# Patient Record
Sex: MALE | Race: WHITE | NOT HISPANIC OR LATINO | URBAN - METROPOLITAN AREA
[De-identification: names, ages, dates, MRNs, and addresses within clinical notes are randomized per-mention and may not be internally consistent; named-entity substitution may affect disease eponyms.]

---

## 2017-01-03 VITALS
TEMPERATURE: 99 F | SYSTOLIC BLOOD PRESSURE: 134 MMHG | RESPIRATION RATE: 18 BRPM | WEIGHT: 159.61 LBS | HEART RATE: 78 BPM | HEIGHT: 66 IN | OXYGEN SATURATION: 96 % | DIASTOLIC BLOOD PRESSURE: 69 MMHG

## 2017-01-03 NOTE — ASU PATIENT PROFILE, ADULT - VISION (WITH CORRECTIVE LENSES IF THE PATIENT USUALLY WEARS THEM):
Normal vision: sees adequately in most situations; can see medication labels, newsprint glasses/Partially impaired: cannot see medication labels or newsprint, but can see obstacles in path, and the surrounding layout; can count fingers at arm's length

## 2017-01-04 ENCOUNTER — OUTPATIENT (OUTPATIENT)
Dept: OUTPATIENT SERVICES | Facility: HOSPITAL | Age: 72
LOS: 1 days | Discharge: HOME | End: 2017-01-04
Payer: MEDICARE

## 2017-01-04 VITALS
DIASTOLIC BLOOD PRESSURE: 62 MMHG | RESPIRATION RATE: 18 BRPM | SYSTOLIC BLOOD PRESSURE: 115 MMHG | HEART RATE: 77 BPM | OXYGEN SATURATION: 97 % | TEMPERATURE: 98 F

## 2017-01-04 DIAGNOSIS — Z87.891 PERSONAL HISTORY OF NICOTINE DEPENDENCE: ICD-10-CM

## 2017-01-04 DIAGNOSIS — M75.42 IMPINGEMENT SYNDROME OF LEFT SHOULDER: ICD-10-CM

## 2017-01-04 DIAGNOSIS — M75.122 COMPLETE ROTATOR CUFF TEAR OR RUPTURE OF LEFT SHOULDER, NOT SPECIFIED AS TRAUMATIC: ICD-10-CM

## 2017-01-04 DIAGNOSIS — Z98.890 OTHER SPECIFIED POSTPROCEDURAL STATES: Chronic | ICD-10-CM

## 2017-01-04 PROCEDURE — 29826 SHO ARTHRS SRG DECOMPRESSION: CPT | Mod: LT

## 2017-01-04 PROCEDURE — 29827 SHO ARTHRS SRG RT8TR CUF RPR: CPT | Mod: LT

## 2017-01-04 PROCEDURE — C1713: CPT

## 2017-01-04 RX ORDER — SODIUM CHLORIDE 9 MG/ML
1000 INJECTION, SOLUTION INTRAVENOUS
Qty: 0 | Refills: 0 | Status: DISCONTINUED | OUTPATIENT
Start: 2017-01-04 | End: 2017-01-04

## 2017-01-04 RX ORDER — MORPHINE SULFATE 50 MG/1
2 CAPSULE, EXTENDED RELEASE ORAL
Qty: 0 | Refills: 0 | Status: DISCONTINUED | OUTPATIENT
Start: 2017-01-04 | End: 2017-01-04

## 2018-11-05 ENCOUNTER — APPOINTMENT (OUTPATIENT)
Dept: PULMONOLOGY | Facility: CLINIC | Age: 73
End: 2018-11-05
Payer: MEDICARE

## 2018-11-05 VITALS
DIASTOLIC BLOOD PRESSURE: 80 MMHG | BODY MASS INDEX: 26.36 KG/M2 | OXYGEN SATURATION: 5 % | HEART RATE: 80 BPM | HEIGHT: 66 IN | WEIGHT: 164 LBS | SYSTOLIC BLOOD PRESSURE: 138 MMHG | RESPIRATION RATE: 16 BRPM

## 2018-11-05 DIAGNOSIS — N40.0 BENIGN PROSTATIC HYPERPLASIA WITHOUT LOWER URINARY TRACT SYMPMS: ICD-10-CM

## 2018-11-05 DIAGNOSIS — Z78.9 OTHER SPECIFIED HEALTH STATUS: ICD-10-CM

## 2018-11-05 PROCEDURE — 99203 OFFICE O/P NEW LOW 30 MIN: CPT | Mod: 25

## 2018-11-05 PROCEDURE — 94729 DIFFUSING CAPACITY: CPT

## 2018-11-05 PROCEDURE — 94727 GAS DIL/WSHOT DETER LNG VOL: CPT

## 2018-11-05 PROCEDURE — 94060 EVALUATION OF WHEEZING: CPT

## 2018-11-06 PROBLEM — N40.0 ENLARGED PROSTATE: Status: RESOLVED | Noted: 2018-11-06 | Resolved: 2018-11-06

## 2018-11-06 PROBLEM — Z78.9 NON-SMOKER: Status: ACTIVE | Noted: 2018-11-06

## 2019-02-27 ENCOUNTER — APPOINTMENT (OUTPATIENT)
Dept: PULMONOLOGY | Facility: CLINIC | Age: 74
End: 2019-02-27
Payer: MEDICARE

## 2019-02-27 VITALS
SYSTOLIC BLOOD PRESSURE: 138 MMHG | HEART RATE: 116 BPM | BODY MASS INDEX: 25.39 KG/M2 | WEIGHT: 158 LBS | DIASTOLIC BLOOD PRESSURE: 74 MMHG | HEIGHT: 66 IN | OXYGEN SATURATION: 94 %

## 2019-02-27 PROCEDURE — 99214 OFFICE O/P EST MOD 30 MIN: CPT

## 2019-02-27 NOTE — DISCUSSION/SUMMARY
[FreeTextEntry1] : 72 yo male with airway hyperreactivity. PRN albuterol MDI use recommended. If use increases, ICS alone will be prescribed. He is to follow up with his PMD as before.

## 2019-02-27 NOTE — REVIEW OF SYSTEMS
[As Noted in HPI] : as noted in HPI [Cough] : no cough [Sputum] : not coughing up ~M sputum [Dyspnea] : dyspnea [Wheezing] : wheezing [Negative] : Sleep Disorder

## 2019-02-27 NOTE — HISTORY OF PRESENT ILLNESS
[FreeTextEntry1] : 74 yo male with hx of OAD presents for follow up. The patient was treated initially with breo 200 and albuterol , with resolution of symptoms. Last week, he again started wheezing with SOB, for which he took breo for two days with improvement. He denies cough, chest pain or hemoptysis.

## 2020-01-29 ENCOUNTER — APPOINTMENT (OUTPATIENT)
Dept: PULMONOLOGY | Facility: CLINIC | Age: 75
End: 2020-01-29
Payer: MEDICARE

## 2020-01-29 VITALS
OXYGEN SATURATION: 95 % | HEIGHT: 67 IN | BODY MASS INDEX: 25.43 KG/M2 | HEART RATE: 72 BPM | WEIGHT: 162 LBS | DIASTOLIC BLOOD PRESSURE: 76 MMHG | SYSTOLIC BLOOD PRESSURE: 139 MMHG

## 2020-01-29 PROCEDURE — 94729 DIFFUSING CAPACITY: CPT

## 2020-01-29 PROCEDURE — 94060 EVALUATION OF WHEEZING: CPT

## 2020-01-29 PROCEDURE — 99214 OFFICE O/P EST MOD 30 MIN: CPT | Mod: 25

## 2020-01-29 PROCEDURE — 94727 GAS DIL/WSHOT DETER LNG VOL: CPT

## 2020-02-07 NOTE — PHYSICAL EXAM
[No Acute Distress] : no acute distress [Normal Oropharynx] : normal oropharynx [Normal Appearance] : normal appearance [No Neck Mass] : no neck mass [Normal Rate/Rhythm] : normal rate/rhythm [Normal S1, S2] : normal s1, s2 [No Murmurs] : no murmurs [No Resp Distress] : no resp distress [Wheeze] : wheeze [No Abnormalities] : no abnormalities [Benign] : benign [Normal Gait] : normal gait [No Clubbing] : no clubbing [No Cyanosis] : no cyanosis [No Edema] : no edema [FROM] : FROM [Normal Color/ Pigmentation] : normal color/ pigmentation [No Focal Deficits] : no focal deficits [Oriented x3] : oriented x3 [Normal Affect] : normal affect [TextBox_105] : Right hand abscess

## 2020-02-07 NOTE — HISTORY OF PRESENT ILLNESS
[TextBox_4] : 73 yo male with hx of hyperreactive airways disease presents complaining of wheezing with GRANDA for six weeks. He denies cough, chest pain or hemoptysis. The patient has used breo 100 PRN in the past with improvement.

## 2020-02-07 NOTE — DISCUSSION/SUMMARY
[FreeTextEntry1] : 75 yo male with OAD related complaints. Medrol pack prescribed. He was given a sample of Breo 100 with PRN albuterol MDI. Treatment adjustment will depend on symptomatic needs. He is to follow up with his PMD as before.

## 2020-06-03 NOTE — ASU PATIENT PROFILE, ADULT - PRO ARRIVE FROM
Dear Team Member,                                Continue to self-monitor for symptoms. If you should develop symptoms, do not report to work, notify your leader, and contact your local Employee Health department for next steps.    Thank you,    Employee Health      Cc: Manager   home

## 2020-08-10 ENCOUNTER — APPOINTMENT (OUTPATIENT)
Dept: PULMONOLOGY | Facility: CLINIC | Age: 75
End: 2020-08-10
Payer: MEDICARE

## 2020-08-10 VITALS
HEIGHT: 67 IN | RESPIRATION RATE: 16 BRPM | SYSTOLIC BLOOD PRESSURE: 120 MMHG | DIASTOLIC BLOOD PRESSURE: 70 MMHG | HEART RATE: 100 BPM | OXYGEN SATURATION: 92 %

## 2020-08-10 PROCEDURE — 99214 OFFICE O/P EST MOD 30 MIN: CPT

## 2020-08-10 NOTE — HISTORY OF PRESENT ILLNESS
[TextBox_4] : 75 yo male with hx of OAD presents for follow up. The patient complains of a two week hx of GRANDA with productive cough and wheezing.He denies fever, chest pain or hemoptysis. He uses albuterol MDI with increased frequency during this time. He last used breo a few weeks ago. The patient also complains of weight loss over the last few months.The patient is a retired , exposed to the [facility] disaster. [TextBox_29] : former pipe smoker

## 2020-08-10 NOTE — DISCUSSION/SUMMARY
[FreeTextEntry1] : 75 yo male with OAD exacerbation. Medrol and zpack prescribed. He is to continue breo 100 daily after with PRN albuterol MDI. Treatment adjustment will depend on symptomatic needs. PFT will be repeated in the future. Further evaluation for weight loss as per his PMD with continued follow up.

## 2020-08-10 NOTE — REVIEW OF SYSTEMS
[Wheezing] : wheezing [Dyspnea] : dyspnea [Negative] : Musculoskeletal [Cough] : no cough [Sputum] : no sputum

## 2020-08-10 NOTE — CONSULT LETTER
[Dear  ___] : Dear  [unfilled], [Courtesy Letter:] : I had the pleasure of seeing your patient, [unfilled], in my office today. [Consult Closing:] : Thank you very much for allowing me to participate in the care of this patient.  If you have any questions, please do not hesitate to contact me. [Please see my note below.] : Please see my note below. [Sincerely,] : Sincerely, [___] : [unfilled]

## 2020-08-10 NOTE — PHYSICAL EXAM
[Normal Oropharynx] : normal oropharynx [No Acute Distress] : no acute distress [No Neck Mass] : no neck mass [Normal Rate/Rhythm] : normal rate/rhythm [Normal Appearance] : normal appearance [No Murmurs] : no murmurs [Normal S1, S2] : normal s1, s2 [No Resp Distress] : no resp distress [Normal Gait] : normal gait [No Abnormalities] : no abnormalities [Benign] : benign [No Clubbing] : no clubbing [No Cyanosis] : no cyanosis [FROM] : FROM [No Edema] : no edema [Normal Color/ Pigmentation] : normal color/ pigmentation [Oriented x3] : oriented x3 [No Focal Deficits] : no focal deficits [Normal Affect] : normal affect [TextBox_68] : crackles both bases with scattered rhonchi

## 2020-09-21 ENCOUNTER — APPOINTMENT (OUTPATIENT)
Dept: PULMONOLOGY | Facility: CLINIC | Age: 75
End: 2020-09-21
Payer: MEDICARE

## 2020-09-21 VITALS
TEMPERATURE: 97.8 F | BODY MASS INDEX: 23.39 KG/M2 | OXYGEN SATURATION: 97 % | HEIGHT: 67 IN | WEIGHT: 149 LBS | HEART RATE: 70 BPM | SYSTOLIC BLOOD PRESSURE: 127 MMHG | DIASTOLIC BLOOD PRESSURE: 66 MMHG | RESPIRATION RATE: 16 BRPM

## 2020-09-21 PROCEDURE — 99214 OFFICE O/P EST MOD 30 MIN: CPT

## 2020-09-21 RX ORDER — AZITHROMYCIN 250 MG/1
250 TABLET, FILM COATED ORAL
Qty: 1 | Refills: 0 | Status: COMPLETED | COMMUNITY
Start: 2020-08-10 | End: 2020-09-21

## 2020-09-21 RX ORDER — METHYLPREDNISOLONE 4 MG/1
4 TABLET ORAL
Qty: 1 | Refills: 0 | Status: COMPLETED | COMMUNITY
Start: 2020-01-29 | End: 2020-09-21

## 2020-09-21 RX ORDER — DOXYCYCLINE HYCLATE 100 MG/1
100 TABLET ORAL
Qty: 20 | Refills: 0 | Status: COMPLETED | COMMUNITY
Start: 2020-06-18

## 2020-09-21 RX ORDER — ETHAMBUTOL HYDROCHLORIDE 400 MG/1
400 TABLET ORAL
Qty: 28 | Refills: 0 | Status: COMPLETED | COMMUNITY
Start: 2020-04-24

## 2020-09-21 RX ORDER — CLARITHROMYCIN 500 MG/1
500 TABLET, FILM COATED ORAL
Qty: 28 | Refills: 0 | Status: COMPLETED | COMMUNITY
Start: 2020-04-24

## 2020-09-27 NOTE — DISCUSSION/SUMMARY
[FreeTextEntry1] : 74 yo male with mild OAD, clinically stable. He is to use albuterol MDI PRN alone for now. Treatment adjustment will depend on symptomatic needs.He was to use nasal saline PRN given rhinitis. He is to follow up with [employer] given [facility] exposure and with his PMD as before and for the influenza and pneumococcal vaccines.

## 2020-09-27 NOTE — HISTORY OF PRESENT ILLNESS
[Never] : never [TextBox_4] : 76 yo male with hx of mild OAD presents for follow up. The patient feels "better" since last visit, treated with breo daily and albuterol MDI. He has not used either inhaler for a few weeks. He complains of nasal congestion without fever, cough, chest pain or hemoptysis. [TextBox_19] : Denies snoring, daytime somnolence, apneic episodes, AM headaches

## 2020-09-27 NOTE — PHYSICAL EXAM
[No Acute Distress] : no acute distress [Normal Oropharynx] : normal oropharynx [Normal Appearance] : normal appearance [No Neck Mass] : no neck mass [Normal Rate/Rhythm] : normal rate/rhythm [Normal S1, S2] : normal s1, s2 [No Murmurs] : no murmurs [No Resp Distress] : no resp distress [No Abnormalities] : no abnormalities [Benign] : benign [Normal Gait] : normal gait [No Clubbing] : no clubbing [No Cyanosis] : no cyanosis [No Edema] : no edema [FROM] : FROM [Normal Color/ Pigmentation] : normal color/ pigmentation [No Focal Deficits] : no focal deficits [Oriented x3] : oriented x3 [Normal Affect] : normal affect [TextBox_68] : crackles both bases

## 2020-09-29 NOTE — ASU PATIENT PROFILE, ADULT - NS SC CAGE ALCOHOL EYE OPENER
Patient here today for cystoscopy secondary to hx High Grade Muscle Invasive bladder cancer. Concerns include frequency, urgency, incontinence, weak stream, nocturia, dysuria and straining. Denies symptoms incomplete emptying and hematuria. Denies known Latex allergy or symptoms of Latex sensitivity. Medications verified, no changes. Patient instructed on procedure, and all questions answered. Patient prepped with 2% Lidocaine jelly in usual fashion, using sterile technique. 16 FR olive tip coude catheter placed into bladder with difficulty, unable to pass into bladder completely. Patient tolerated well. Dr Lakeisha Cleary arrived and began procedure. Patient and family friend instructed by Gallo Castanon that the first couple of urination will burn/sting and small amount of blood in urine may be visible. Patient instructed to increase water intake. If symptoms persist or if temp >101.5 is noted patient to return call for further instruction. Patient verbalized understanding. no

## 2020-10-19 ENCOUNTER — APPOINTMENT (OUTPATIENT)
Dept: PULMONOLOGY | Facility: CLINIC | Age: 75
End: 2020-10-19
Payer: MEDICARE

## 2020-10-19 VITALS
WEIGHT: 148 LBS | TEMPERATURE: 96.8 F | OXYGEN SATURATION: 91 % | HEIGHT: 67 IN | SYSTOLIC BLOOD PRESSURE: 118 MMHG | DIASTOLIC BLOOD PRESSURE: 67 MMHG | BODY MASS INDEX: 23.23 KG/M2 | HEART RATE: 84 BPM

## 2020-10-19 VITALS — RESPIRATION RATE: 18 BRPM | OXYGEN SATURATION: 95 %

## 2020-10-19 PROCEDURE — 99214 OFFICE O/P EST MOD 30 MIN: CPT

## 2020-10-19 RX ORDER — FLUTICASONE FUROATE AND VILANTEROL TRIFENATATE 100; 25 UG/1; UG/1
100-25 POWDER RESPIRATORY (INHALATION)
Qty: 1 | Refills: 5 | Status: DISCONTINUED | OUTPATIENT
Start: 2020-01-29 | End: 2020-10-19

## 2020-10-19 RX ORDER — METHYLPREDNISOLONE 4 MG/1
4 TABLET ORAL
Qty: 1 | Refills: 0 | Status: ACTIVE | COMMUNITY
Start: 2020-10-19 | End: 1900-01-01

## 2020-10-26 NOTE — DISCUSSION/SUMMARY
[FreeTextEntry1] : 74 yo male with OAD related complaints made worse by recent exposure to leaves and seasonal change.Medrol pack prescribed with daily montelukast use and PRN albuterol MDI PRN. Treatment adjustment will depend on symptomatic needs. He states that he has received the influenza vaccine. Follow up with his PMD as before.

## 2020-10-26 NOTE — PHYSICAL EXAM
[No Acute Distress] : no acute distress [Normal Oropharynx] : normal oropharynx [Normal Appearance] : normal appearance [No Neck Mass] : no neck mass [Normal Rate/Rhythm] : normal rate/rhythm [Normal S1, S2] : normal s1, s2 [No Murmurs] : no murmurs [No Resp Distress] : no resp distress [Wheeze] : wheeze [No Abnormalities] : no abnormalities [Benign] : benign [Normal Gait] : normal gait [No Clubbing] : no clubbing [No Cyanosis] : no cyanosis [No Edema] : no edema [FROM] : FROM [Normal Color/ Pigmentation] : normal color/ pigmentation [No Focal Deficits] : no focal deficits [Oriented x3] : oriented x3 [Normal Affect] : normal affect [TextBox_68] : crackles both bases

## 2020-10-26 NOTE — HISTORY OF PRESENT ILLNESS
[Never] : never [TextBox_4] : 74 yo male with hx of mild OAD presents for follow up. The patient complains of increase in dry cough with wheezing and [place].. He denies fever, chest pain or hemoptysis. He has increased use of albuterol MDI during this time. He is on no other respiratory meds.  [TextBox_29] : Denies snoring, daytime somnolence, apneic episodes, AM headaches

## 2020-10-26 NOTE — REVIEW OF SYSTEMS
[Nasal Congestion] : nasal congestion [Postnasal Drip] : postnasal drip [Cough] : cough [Dyspnea] : dyspnea [Wheezing] : wheezing [Negative] : Endocrine [Sputum] : no sputum

## 2020-10-26 NOTE — CONSULT LETTER
[Dear  ___] : Dear  [unfilled], [Courtesy Letter:] : I had the pleasure of seeing your patient, [unfilled], in my office today. [Please see my note below.] : Please see my note below. [Consult Closing:] : Thank you very much for allowing me to participate in the care of this patient.  If you have any questions, please do not hesitate to contact me. [Sincerely,] : Sincerely, [DrSandy  ___] : Dr. ANN [___] : [unfilled]

## 2020-12-21 ENCOUNTER — APPOINTMENT (OUTPATIENT)
Dept: PULMONOLOGY | Facility: CLINIC | Age: 75
End: 2020-12-21
Payer: MEDICARE

## 2020-12-21 VITALS
TEMPERATURE: 98 F | RESPIRATION RATE: 16 BRPM | WEIGHT: 150 LBS | BODY MASS INDEX: 23.54 KG/M2 | HEART RATE: 80 BPM | DIASTOLIC BLOOD PRESSURE: 70 MMHG | HEIGHT: 67 IN | OXYGEN SATURATION: 94 % | SYSTOLIC BLOOD PRESSURE: 120 MMHG

## 2020-12-21 PROCEDURE — 99214 OFFICE O/P EST MOD 30 MIN: CPT

## 2020-12-22 NOTE — REVIEW OF SYSTEMS
[Cough] : cough [Dyspnea] : dyspnea [Wheezing] : wheezing [Negative] : Endocrine [Nasal Congestion] : no nasal congestion [Postnasal Drip] : no postnasal drip [Sputum] : no sputum

## 2020-12-22 NOTE — HISTORY OF PRESENT ILLNESS
[Never] : never [TextBox_4] : 76 yo male with hx of AOD presents for follow up. The patient complains of a 10 day hx of SOB with mild dry cough. He denies fever, chest pain or hemoptysis. He has increased use of albuterol MDI but has stopped montelukast use. [TextBox_29] : Denies snoring, daytime somnolence, apneic episodes, AM headaches

## 2020-12-22 NOTE — DISCUSSION/SUMMARY
[FreeTextEntry1] : 76 yo male with OAD related complaints, in part due to having stopped meds. Prednisone 40 mg daily for five days prescribed. He is to restart use of montelukast with PRN albuterol MDI. He was given a sample of trelegy to use transiently. He is to follow up with his PMD  and [facility] board as before.

## 2021-03-04 NOTE — REVIEW OF SYSTEMS
endoscopy [Nasal Congestion] : nasal congestion [Postnasal Drip] : postnasal drip [Cough] : no cough [Sputum] : no sputum [Dyspnea] : dyspnea [Wheezing] : wheezing [Negative] : Endocrine

## 2021-03-08 ENCOUNTER — APPOINTMENT (OUTPATIENT)
Dept: PULMONOLOGY | Facility: CLINIC | Age: 76
End: 2021-03-08
Payer: MEDICARE

## 2021-03-08 VITALS
SYSTOLIC BLOOD PRESSURE: 120 MMHG | WEIGHT: 150 LBS | HEART RATE: 82 BPM | RESPIRATION RATE: 18 BRPM | DIASTOLIC BLOOD PRESSURE: 70 MMHG | HEIGHT: 67 IN | BODY MASS INDEX: 23.54 KG/M2 | OXYGEN SATURATION: 96 %

## 2021-03-08 PROCEDURE — 99212 OFFICE O/P EST SF 10 MIN: CPT

## 2021-03-08 NOTE — REVIEW OF SYSTEMS
[Nasal Congestion] : no nasal congestion [Postnasal Drip] : no postnasal drip [Cough] : cough [Sputum] : no sputum [Dyspnea] : dyspnea [Wheezing] : wheezing [Negative] : Endocrine

## 2021-03-08 NOTE — DISCUSSION/SUMMARY
[FreeTextEntry1] : 76 yo male with recurrence of OAD related complaints. Prednisone 40 mg daily for five days prescribed. He was given a sample of trelegy 100 to use for two weeks after with continued use of montelukast daily and PRN albuterol MDI as before.Given the frequency of symptoms, labs for asthma phenotyping will be drawn. Further treatment adjustment will depend on symptomatic needs He is to follow up with his PMD as before.

## 2021-03-08 NOTE — HISTORY OF PRESENT ILLNESS
[Never] : never [TextBox_4] : 76 yo male with hx of OAD presents for follow up. The patient complains of few day history of wheezing with SOB requiring increase in albuterol MDI use. He denies fever, chest pain or hemoptysis. He continues to use montelukast daily.  [TextBox_29] : Denies snoring, daytime somnolence, apneic episodes, AM headaches

## 2021-05-26 ENCOUNTER — APPOINTMENT (OUTPATIENT)
Dept: PULMONOLOGY | Facility: CLINIC | Age: 76
End: 2021-05-26
Payer: MEDICARE

## 2021-05-26 VITALS
OXYGEN SATURATION: 93 % | DIASTOLIC BLOOD PRESSURE: 76 MMHG | BODY MASS INDEX: 23.54 KG/M2 | HEART RATE: 100 BPM | SYSTOLIC BLOOD PRESSURE: 135 MMHG | HEIGHT: 67 IN | TEMPERATURE: 97.1 F | WEIGHT: 150 LBS

## 2021-05-26 PROCEDURE — 99213 OFFICE O/P EST LOW 20 MIN: CPT

## 2021-06-12 NOTE — HISTORY OF PRESENT ILLNESS
[TextBox_4] : 76 yo male with hx of OAD presents complaining of two week history of SOB with productive cough and wheezing. He denies fever, chest pain or hemoptysis. He felt better after two week trelegy use two months ago, with continued use of montelukast daily and PRN albuterol MDI.

## 2021-06-12 NOTE — DISCUSSION/SUMMARY
[FreeTextEntry1] : 76 yo male with recurrent OAD related complaints. He was given a two week sample of trelegy after which he is to continue breo 200 with daily montelukast and PRN albuterol MDI. Based on asthma phenotype, the patient is a candidate for use of biologics which was discussed and will be considered in the future if needed. He is to follow up with his PMD as before.

## 2021-06-12 NOTE — REVIEW OF SYSTEMS
[Nasal Congestion] : no nasal congestion [Postnasal Drip] : no postnasal drip [Cough] : cough [Sputum] : sputum [Dyspnea] : dyspnea [Wheezing] : wheezing [Negative] : Endocrine

## 2022-02-21 ENCOUNTER — APPOINTMENT (OUTPATIENT)
Dept: PULMONOLOGY | Facility: CLINIC | Age: 77
End: 2022-02-21
Payer: MEDICARE

## 2022-02-21 VITALS
BODY MASS INDEX: 23.54 KG/M2 | TEMPERATURE: 98 F | WEIGHT: 150 LBS | SYSTOLIC BLOOD PRESSURE: 138 MMHG | HEART RATE: 88 BPM | DIASTOLIC BLOOD PRESSURE: 77 MMHG | RESPIRATION RATE: 16 BRPM | HEIGHT: 67 IN | OXYGEN SATURATION: 95 %

## 2022-02-21 DIAGNOSIS — J45.21 MILD INTERMITTENT ASTHMA WITH (ACUTE) EXACERBATION: ICD-10-CM

## 2022-02-21 PROCEDURE — 99214 OFFICE O/P EST MOD 30 MIN: CPT

## 2022-03-19 PROBLEM — J45.21 INTERMITTENT ASTHMA WITH ACUTE EXACERBATION, UNSPECIFIED ASTHMA SEVERITY: Status: ACTIVE | Noted: 2018-11-06

## 2022-03-19 NOTE — HISTORY OF PRESENT ILLNESS
[Never] : never [TextBox_4] : 75 yo male with hx of OAD presents for follow up. The patient was "OK" until a few weeks ago, complaining of SOB with productive cough and wheezing. He denies fever, chest pain or hemoptysis. He was on montelukast daily with rare albuterol use, having stopped breo five months ago. He restarted breo yesterday with continued montelukast use [TextBox_29] : Denies snoring, daytime somnolence, apneic episodes, AM headaches

## 2022-03-19 NOTE — DISCUSSION/SUMMARY
[FreeTextEntry1] : 75 yo male with hx of OAD with related complaints.He was given a two week sample of trelegy 200 after which he is to continue with breo as before. He is to continue montelukast and albuterol MDi as before. He is to follow up with his PMD as before.

## 2022-06-20 ENCOUNTER — APPOINTMENT (OUTPATIENT)
Dept: PULMONOLOGY | Facility: CLINIC | Age: 77
End: 2022-06-20

## 2022-06-20 VITALS
OXYGEN SATURATION: 93 % | DIASTOLIC BLOOD PRESSURE: 75 MMHG | RESPIRATION RATE: 24 BRPM | SYSTOLIC BLOOD PRESSURE: 135 MMHG | HEART RATE: 92 BPM | BODY MASS INDEX: 23.54 KG/M2 | WEIGHT: 150 LBS | HEIGHT: 67 IN

## 2022-06-20 PROCEDURE — 99214 OFFICE O/P EST MOD 30 MIN: CPT

## 2022-06-20 RX ORDER — DUTASTERIDE AND TAMSULOSIN HYDROCHLORIDE .5; .4 MG/1; MG/1
0.5-0.4 CAPSULE ORAL
Qty: 90 | Refills: 0 | Status: COMPLETED | COMMUNITY
Start: 2020-04-11 | End: 2022-06-20

## 2022-06-20 RX ORDER — PREDNISONE 20 MG/1
20 TABLET ORAL
Qty: 10 | Refills: 0 | Status: COMPLETED | COMMUNITY
Start: 2020-12-21 | End: 2022-06-20

## 2022-06-20 RX ORDER — MONTELUKAST 10 MG/1
10 TABLET, FILM COATED ORAL
Qty: 90 | Refills: 3 | Status: ACTIVE | COMMUNITY
Start: 2022-02-21 | End: 1900-01-01

## 2022-06-20 RX ORDER — PREDNISONE 20 MG/1
20 TABLET ORAL
Qty: 10 | Refills: 0 | Status: COMPLETED | COMMUNITY
Start: 2021-03-08 | End: 2022-06-20

## 2022-06-20 RX ORDER — FLUTICASONE FUROATE AND VILANTEROL TRIFENATATE 200; 25 UG/1; UG/1
200-25 POWDER RESPIRATORY (INHALATION)
Qty: 1 | Refills: 3 | Status: ACTIVE | COMMUNITY
Start: 2021-05-26 | End: 1900-01-01

## 2022-06-20 RX ORDER — ALBUTEROL SULFATE 108 UG/1
108 (90 BASE) AEROSOL, METERED RESPIRATORY (INHALATION)
Refills: 0 | Status: COMPLETED | COMMUNITY
Start: 2018-11-06 | End: 2022-06-20

## 2022-06-20 RX ORDER — MONTELUKAST 10 MG/1
10 TABLET, FILM COATED ORAL
Qty: 90 | Refills: 3 | Status: COMPLETED | COMMUNITY
Start: 2020-10-19 | End: 2022-06-20

## 2022-06-20 RX ORDER — ALBUTEROL SULFATE 90 UG/1
108 (90 BASE) INHALANT RESPIRATORY (INHALATION)
Qty: 1 | Refills: 1 | Status: ACTIVE | COMMUNITY
Start: 2020-01-29 | End: 1900-01-01

## 2022-06-30 NOTE — DISCUSSION/SUMMARY
[FreeTextEntry1] : 75 yo male with OAD related complaints with environmental component. Breo 200 restarted with continued use of montelukast daily and PRN albuterol MDI. Treatment adjustment will depend on symptomatic needs.He is to follow up with his PMD as before.

## 2022-06-30 NOTE — HISTORY OF PRESENT ILLNESS
[Never] : never [TextBox_4] : 75 yo male with hx of OAD presents for follow up. The patient is "better" after last week when he complained of increased cough and SOB, requiring increased albuterol MDI use. [place] He denies fever, chest pain or hemoptysis. He continues to use montelukast daily with PRN albuterol MDI. [TextBox_29] : Denies snoring, daytime somnolence, apneic episodes, AM headaches

## 2022-06-30 NOTE — PHYSICAL EXAM
[No Acute Distress] : no acute distress [Normal Oropharynx] : normal oropharynx [Normal Appearance] : normal appearance [No Neck Mass] : no neck mass [Normal Rate/Rhythm] : normal rate/rhythm [No Murmurs] : no murmurs [Normal S1, S2] : normal s1, s2 [No Resp Distress] : no resp distress [Clear to Auscultation Bilaterally] : clear to auscultation bilaterally [Wheeze] : wheeze [No Abnormalities] : no abnormalities [Benign] : benign [Normal Gait] : normal gait [No Clubbing] : no clubbing [No Cyanosis] : no cyanosis [No Edema] : no edema [FROM] : FROM [Normal Color/ Pigmentation] : normal color/ pigmentation [No Focal Deficits] : no focal deficits [Oriented x3] : oriented x3 [Normal Affect] : normal affect

## 2022-06-30 NOTE — REVIEW OF SYSTEMS
[Nasal Congestion] : no nasal congestion [Postnasal Drip] : no postnasal drip [Cough] : cough [Sputum] : sputum [Dyspnea] : dyspnea [Wheezing] : no wheezing [Negative] : Endocrine

## 2023-01-23 ENCOUNTER — NON-APPOINTMENT (OUTPATIENT)
Age: 78
End: 2023-01-23

## 2023-01-24 ENCOUNTER — OUTPATIENT (OUTPATIENT)
Dept: OUTPATIENT SERVICES | Facility: HOSPITAL | Age: 78
LOS: 1 days | End: 2023-01-24
Payer: MEDICARE

## 2023-01-24 ENCOUNTER — APPOINTMENT (OUTPATIENT)
Dept: NEUROLOGY | Facility: CLINIC | Age: 78
End: 2023-01-24
Payer: MEDICARE

## 2023-01-24 ENCOUNTER — RESULT REVIEW (OUTPATIENT)
Age: 78
End: 2023-01-24

## 2023-01-24 ENCOUNTER — NON-APPOINTMENT (OUTPATIENT)
Age: 78
End: 2023-01-24

## 2023-01-24 ENCOUNTER — INPATIENT (INPATIENT)
Facility: HOSPITAL | Age: 78
LOS: 6 days | Discharge: HOME | DRG: 101 | End: 2023-01-31
Attending: PSYCHIATRY & NEUROLOGY | Admitting: PSYCHIATRY & NEUROLOGY
Payer: MEDICARE

## 2023-01-24 ENCOUNTER — APPOINTMENT (OUTPATIENT)
Dept: MRI IMAGING | Facility: CLINIC | Age: 78
End: 2023-01-24

## 2023-01-24 VITALS
OXYGEN SATURATION: 97 % | WEIGHT: 153 LBS | TEMPERATURE: 97 F | RESPIRATION RATE: 17 BRPM | SYSTOLIC BLOOD PRESSURE: 152 MMHG | DIASTOLIC BLOOD PRESSURE: 80 MMHG | HEART RATE: 75 BPM | HEIGHT: 64 IN

## 2023-01-24 VITALS
SYSTOLIC BLOOD PRESSURE: 134 MMHG | DIASTOLIC BLOOD PRESSURE: 69 MMHG | WEIGHT: 153 LBS | HEIGHT: 64 IN | HEART RATE: 70 BPM | BODY MASS INDEX: 26.12 KG/M2

## 2023-01-24 DIAGNOSIS — Z98.890 OTHER SPECIFIED POSTPROCEDURAL STATES: Chronic | ICD-10-CM

## 2023-01-24 DIAGNOSIS — R40.4 TRANSIENT ALTERATION OF AWARENESS: ICD-10-CM

## 2023-01-24 DIAGNOSIS — R56.9 UNSPECIFIED CONVULSIONS: ICD-10-CM

## 2023-01-24 LAB
ALBUMIN SERPL ELPH-MCNC: 4.1 G/DL — SIGNIFICANT CHANGE UP (ref 3.3–5)
ALBUMIN SERPL ELPH-MCNC: 4.2 G/DL — SIGNIFICANT CHANGE UP (ref 3.3–5)
ALP SERPL-CCNC: 86 U/L — SIGNIFICANT CHANGE UP (ref 40–120)
ALP SERPL-CCNC: 89 U/L — SIGNIFICANT CHANGE UP (ref 40–120)
ALT FLD-CCNC: 14 U/L — SIGNIFICANT CHANGE UP (ref 10–45)
ALT FLD-CCNC: 16 U/L — SIGNIFICANT CHANGE UP (ref 10–45)
ANION GAP SERPL CALC-SCNC: 10 MMOL/L — SIGNIFICANT CHANGE UP (ref 5–17)
ANION GAP SERPL CALC-SCNC: 11 MMOL/L — SIGNIFICANT CHANGE UP (ref 5–17)
APTT BLD: 29.2 SEC — SIGNIFICANT CHANGE UP (ref 27.5–35.5)
AST SERPL-CCNC: 17 U/L — SIGNIFICANT CHANGE UP (ref 10–40)
AST SERPL-CCNC: 21 U/L — SIGNIFICANT CHANGE UP (ref 10–40)
BASOPHILS # BLD AUTO: 0.14 K/UL — SIGNIFICANT CHANGE UP (ref 0–0.2)
BASOPHILS NFR BLD AUTO: 1.8 % — SIGNIFICANT CHANGE UP (ref 0–2)
BILIRUB SERPL-MCNC: 0.3 MG/DL — SIGNIFICANT CHANGE UP (ref 0.2–1.2)
BILIRUB SERPL-MCNC: 0.4 MG/DL — SIGNIFICANT CHANGE UP (ref 0.2–1.2)
BUN SERPL-MCNC: 19 MG/DL — SIGNIFICANT CHANGE UP (ref 7–23)
BUN SERPL-MCNC: 21 MG/DL — SIGNIFICANT CHANGE UP (ref 7–23)
CALCIUM SERPL-MCNC: 9.3 MG/DL — SIGNIFICANT CHANGE UP (ref 8.4–10.5)
CALCIUM SERPL-MCNC: 9.6 MG/DL — SIGNIFICANT CHANGE UP (ref 8.4–10.5)
CHLORIDE SERPL-SCNC: 101 MMOL/L — SIGNIFICANT CHANGE UP (ref 96–108)
CHLORIDE SERPL-SCNC: 101 MMOL/L — SIGNIFICANT CHANGE UP (ref 96–108)
CO2 SERPL-SCNC: 26 MMOL/L — SIGNIFICANT CHANGE UP (ref 22–31)
CO2 SERPL-SCNC: 26 MMOL/L — SIGNIFICANT CHANGE UP (ref 22–31)
CREAT SERPL-MCNC: 0.88 MG/DL — SIGNIFICANT CHANGE UP (ref 0.5–1.3)
CREAT SERPL-MCNC: 0.96 MG/DL — SIGNIFICANT CHANGE UP (ref 0.5–1.3)
EGFR: 81 ML/MIN/1.73M2 — SIGNIFICANT CHANGE UP
EGFR: 88 ML/MIN/1.73M2 — SIGNIFICANT CHANGE UP
EOSINOPHIL # BLD AUTO: 0.51 K/UL — HIGH (ref 0–0.5)
EOSINOPHIL NFR BLD AUTO: 6.6 % — HIGH (ref 0–6)
GLUCOSE SERPL-MCNC: 92 MG/DL — SIGNIFICANT CHANGE UP (ref 70–99)
GLUCOSE SERPL-MCNC: 96 MG/DL — SIGNIFICANT CHANGE UP (ref 70–99)
HCT VFR BLD CALC: 41.2 % — SIGNIFICANT CHANGE UP (ref 39–50)
HGB BLD-MCNC: 12.7 G/DL — LOW (ref 13–17)
IMM GRANULOCYTES NFR BLD AUTO: 0.4 % — SIGNIFICANT CHANGE UP (ref 0–0.9)
INR BLD: 1.15 RATIO — SIGNIFICANT CHANGE UP (ref 0.88–1.16)
LACTATE SERPL-SCNC: 0.9 MMOL/L — SIGNIFICANT CHANGE UP (ref 0.5–2)
LYMPHOCYTES # BLD AUTO: 0.98 K/UL — LOW (ref 1–3.3)
LYMPHOCYTES # BLD AUTO: 12.6 % — LOW (ref 13–44)
MCHC RBC-ENTMCNC: 24.1 PG — LOW (ref 27–34)
MCHC RBC-ENTMCNC: 30.8 GM/DL — LOW (ref 32–36)
MCV RBC AUTO: 78.3 FL — LOW (ref 80–100)
MONOCYTES # BLD AUTO: 0.67 K/UL — SIGNIFICANT CHANGE UP (ref 0–0.9)
MONOCYTES NFR BLD AUTO: 8.6 % — SIGNIFICANT CHANGE UP (ref 2–14)
NEUTROPHILS # BLD AUTO: 5.42 K/UL — SIGNIFICANT CHANGE UP (ref 1.8–7.4)
NEUTROPHILS NFR BLD AUTO: 70 % — SIGNIFICANT CHANGE UP (ref 43–77)
NRBC # BLD: 0 /100 WBCS — SIGNIFICANT CHANGE UP (ref 0–0)
PLATELET # BLD AUTO: 319 K/UL — SIGNIFICANT CHANGE UP (ref 150–400)
POTASSIUM SERPL-MCNC: 4.4 MMOL/L — SIGNIFICANT CHANGE UP (ref 3.5–5.3)
POTASSIUM SERPL-MCNC: 4.7 MMOL/L — SIGNIFICANT CHANGE UP (ref 3.5–5.3)
POTASSIUM SERPL-SCNC: 4.4 MMOL/L — SIGNIFICANT CHANGE UP (ref 3.5–5.3)
POTASSIUM SERPL-SCNC: 4.7 MMOL/L — SIGNIFICANT CHANGE UP (ref 3.5–5.3)
PROT SERPL-MCNC: 7.2 G/DL — SIGNIFICANT CHANGE UP (ref 6–8.3)
PROT SERPL-MCNC: 7.7 G/DL — SIGNIFICANT CHANGE UP (ref 6–8.3)
PROTHROM AB SERPL-ACNC: 13.2 SEC — SIGNIFICANT CHANGE UP (ref 10.5–13.4)
RAPID RVP RESULT: SIGNIFICANT CHANGE UP
RBC # BLD: 5.26 M/UL — SIGNIFICANT CHANGE UP (ref 4.2–5.8)
RBC # FLD: 15.6 % — HIGH (ref 10.3–14.5)
SARS-COV-2 RNA SPEC QL NAA+PROBE: SIGNIFICANT CHANGE UP
SODIUM SERPL-SCNC: 137 MMOL/L — SIGNIFICANT CHANGE UP (ref 135–145)
SODIUM SERPL-SCNC: 138 MMOL/L — SIGNIFICANT CHANGE UP (ref 135–145)
WBC # BLD: 7.75 K/UL — SIGNIFICANT CHANGE UP (ref 3.8–10.5)
WBC # FLD AUTO: 7.75 K/UL — SIGNIFICANT CHANGE UP (ref 3.8–10.5)

## 2023-01-24 PROCEDURE — 95816 EEG AWAKE AND DROWSY: CPT

## 2023-01-24 PROCEDURE — 95700 EEG CONT REC W/VID EEG TECH: CPT

## 2023-01-24 PROCEDURE — 95705 EEG W/O VID 2-12 HR UNMNTR: CPT

## 2023-01-24 PROCEDURE — 95720 EEG PHY/QHP EA INCR W/VEEG: CPT

## 2023-01-24 PROCEDURE — 99285 EMERGENCY DEPT VISIT HI MDM: CPT

## 2023-01-24 PROCEDURE — 70553 MRI BRAIN STEM W/O & W/DYE: CPT | Mod: 26,MH

## 2023-01-24 PROCEDURE — 99204 OFFICE O/P NEW MOD 45 MIN: CPT

## 2023-01-24 PROCEDURE — 95717 EEG PHYS/QHP 2-12 HR W/O VID: CPT

## 2023-01-24 PROCEDURE — 71045 X-RAY EXAM CHEST 1 VIEW: CPT | Mod: 26

## 2023-01-24 PROCEDURE — 99232 SBSQ HOSP IP/OBS MODERATE 35: CPT | Mod: FS

## 2023-01-24 RX ORDER — LACOSAMIDE 50 MG/1
200 TABLET ORAL ONCE
Refills: 0 | Status: DISCONTINUED | OUTPATIENT
Start: 2023-01-24 | End: 2023-01-31

## 2023-01-24 RX ORDER — MONTELUKAST 4 MG/1
10 TABLET, CHEWABLE ORAL ONCE
Refills: 0 | Status: COMPLETED | OUTPATIENT
Start: 2023-01-24 | End: 2023-01-24

## 2023-01-24 RX ORDER — FLUTICASONE PROPIONATE 50 MCG
2 SPRAY, SUSPENSION NASAL
Refills: 0 | Status: DISCONTINUED | OUTPATIENT
Start: 2023-01-24 | End: 2023-01-31

## 2023-01-24 RX ORDER — ALBUTEROL 90 UG/1
2 AEROSOL, METERED ORAL EVERY 6 HOURS
Refills: 0 | Status: DISCONTINUED | OUTPATIENT
Start: 2023-01-24 | End: 2023-01-31

## 2023-01-24 RX ADMIN — Medication 2 SPRAY(S): at 17:57

## 2023-01-24 RX ADMIN — MONTELUKAST 10 MILLIGRAM(S): 4 TABLET, CHEWABLE ORAL at 22:17

## 2023-01-24 RX ADMIN — Medication 1 TABLET(S): at 17:57

## 2023-01-24 NOTE — ED ADULT NURSE NOTE - OBJECTIVE STATEMENT
78 y/o M presents to the ED after going to neurologist appt where he got ECG and MRI done. MD found "abnormal findings on the scans, and seizure like activity." advised pt to go to emergency department to get admitted. 76 y/o M presents to the ED after going to neurologist appt where he got ECG and MRI done. MD found "abnormal findings on the scans, and seizure like activity." advised pt to go to emergency department to get admitted. Pt has PMH of asthma but denies any other PMH. Pt states the Seizure like activity (feeling faint for 5 seconds) started in october, went to PCP who did blood work and scans which were negative. last week pt has been having more bouts of the lightheadedness. Pt on CM NSR. Pt denies CP, SOB, HA, vision changes, n/v/d, fevers chills, abdominal pain. Upon assessment, abdomen soft and nontender, +strong peripheral pulses, moving all extremities without difficulty, lungs clear.

## 2023-01-24 NOTE — ED PROVIDER NOTE - PHYSICAL EXAMINATION
Const: appearing stated age, no acute distress  Head: eeg monitor on the patient   Eyes: no conjunctival injection and no scleral icterus  ENMT: Atraumatic external nose and ears, Moist mucus membranes  Neck: Symmetric, trachea midline, no c spine tenderness  BACK: no bruising, no midline t/l spine tenderness  CVS: +S1/S2, dorsalis pedis/radial pulse 2+ bilaterally  RESP: Unlabored respiratory effort, Clear to auscultation bilaterally  GI: Nontender/Nondistended, soft abdomen  MSK: Extremities w/o deformity or ttp   Skin: Warm, Dry w/ no rashes  Neuro: GCS=15, CN II-XII grossly intact, motor in all 4 extremities equal, Sensation intact in the bilateral arms and legs  Psych: Awake, Alert, & Orientedx3;  Appropriate mood and affect, cooperative

## 2023-01-24 NOTE — CONSULT LETTER
[Dear  ___] : Dear  [unfilled], [Consult Letter:] : I had the pleasure of evaluating your patient, [unfilled]. [Please see my note below.] : Please see my note below. [Consult Closing:] : Thank you very much for allowing me to participate in the care of this patient.  If you have any questions, please do not hesitate to contact me. [Sincerely,] : Sincerely, [FreeTextEntry3] : Galdino Thakkar MD\par

## 2023-01-24 NOTE — HISTORY OF PRESENT ILLNESS
[FreeTextEntry1] : Mr. Aj Albarran is a 77-year-old right-handed gentleman who was referred for neurologic evaluation at your kind suggestion.  He was accompanied by his wife Armida.\par \par Mr. Albarran suffers from asthma.  Since receiving his COVID-19 booster 2 years ago, he experiences occasional burning feet.  He is otherwise well.\par \par Since 2022, he has experienced about 30 stereotypical episodes.  He suddenly feels strange but cannot describe his symptoms in more detail.  According to Armida, he does not immediately recall the event.  He looks strange.  He denies other aura like symptoms, headaches or history of head trauma.  His episodes have been increasing in frequency occurring several times a day.  25 years ago he lost consciousness while at a  but has never had any other episodes of loss of consciousness.  He denies chest pains, palpitations or shortness of breath.\par \par He underwent a CT of the brain at [facility] on .  That study was unremarkable.\par \par Past surgical history notable for left shoulder procedure.  He suffers from asthma.  There is no history of hypertension, diabetes, hyperlipidemia, cardiac, renal, hepatic, gastrointestinal, thyroid, hematologic or cerebrovascular disease.  He has no allergies.  His only medication is montelukast.  He is a former pipe smoker and drinks socially.  He is  and is a retired .  Family history is notable for sudden death in his brother and father and thyroid disease in his mother.

## 2023-01-24 NOTE — PATIENT PROFILE ADULT - FALL HARM RISK - UNIVERSAL INTERVENTIONS
Bed in lowest position, wheels locked, appropriate side rails in place/Call bell, personal items and telephone in reach/Instruct patient to call for assistance before getting out of bed or chair/Non-slip footwear when patient is out of bed/[place] to call system/Physically safe environment - no spills, clutter or unnecessary equipment/Purposeful Proactive Rounding/Room/bathroom lighting operational, light cord in reach

## 2023-01-24 NOTE — ED CLERICAL - NS ED CLERK NOTE PRE-ARRIVAL INFORMATION; ADDITIONAL PRE-ARRIVAL INFORMATION
CC/Reason For referral:  multiple seizures per day.  admit to emu   Preferred Consultant(if applicable):  Who admits for you (if needed):  Do you have documents you would like to fax over?  Would you still like to speak to an ED attending?  please call after patient is seen

## 2023-01-24 NOTE — H&P ADULT - HISTORY OF PRESENT ILLNESS
78y M with Hx asthma, sinusitis, who presents w/ CC of abnormal EEG. Since October 2022, he has been having intermittent lightheaded episodes that last 5-6 seconds. He maintains awareness and afterwards is back to his mental baseline. No associated convulsions, tongue biting, urinary incontinence, visual aura, or post-ictal period. For the past week, episodes have been occurring 6-7x daily. He has been on outpatient EEG per Dr. Thakkar, which found possible L temporal seizure w/ concern for risk to generalize. He was sent in by Dr. Sweet for admission to EMU. No prior trial of AEDs. He notes that he was started on 10-day course of antibiotics for sinusitis, per PCP, and took first dose on Saturday 1/21/23

## 2023-01-24 NOTE — ED ADULT TRIAGE NOTE - NS ED TRIAGE AVPU SCALE
Alert-The patient is alert, awake and responds to voice. The patient is oriented to time, place, and person. The triage nurse is able to obtain subjective information.
Patient/Caregiver provided printed discharge information.

## 2023-01-24 NOTE — PHYSICAL EXAM
[FreeTextEntry1] : Constitutional:  Patient was well-developed, well-nourished and in no acute distress. \par \par Head:  Normocephalic, atraumatic. Tympanic membranes were clear. \par \par Neck:  Supple with full range of motion. \par \par Cardiovascular:  Cardiac rhythm was regular without murmur. There were no carotid bruits. Peripheral pulses were full and symmetric.  Blood pressure was 120/80 seated and erect in both arms.\par \par Respiratory:  Lungs were clear. \par \par Abdomen:  Soft and nontender. \par \par Spine:  Nontender. \par \par Skin:  There were no rashes. \par \par NEUROLOGICAL EXAMINATION:\par \par Mental Status: Patient was alert and oriented. Speech was fluent. There was no dysarthria. \par \par Cranial Nerves: \par \par II: Visual acuity was 20/30 bilaterally with glasses and near card. Pupils were equal and reactive. Visual fields were full. Funduscopic examination was normal. \par \par III, IV, VI:  Eye movements were full without nystagmus. \par \par V: Facial sensation was intact. \par \par VII: Facial strength was normal. \par \par VIII: Hearing was equal. Nylen Barany maneuver was negative.\par \par IX, X: Palatal movement was normal. Phonation was normal. \par \par XI: Sternocleidomastoids and trapezii were normal. \par \par XII: Tongue was midline and movements normal. There was no lingual atrophy or fasciculations. \par \par Motor Examination: Muscle bulk, tone and strength were normal. \par \par Sensory Examination: Pinprick, vibration and joint position sense were intact. \par \par Reflexes: DTRs were 1 at the biceps and triceps, 2 at the knees and ankles and absent at the brachioradialis.\par \par Plantar Responses: Plantar responses were flexor. \par \par Coordination/Cerebellar Function: There was no dysmetria on finger to nose or heel to shin testing. \par \par Gait/Stance: Gait and tandem were normal. Romberg was negative.\par

## 2023-01-24 NOTE — ED ADULT TRIAGE NOTE - CHIEF COMPLAINT QUOTE
since Thursday  been having 5-6 episodes a day of 5-6 seconds of feeling of lightheadedness; sent from EEG appointment today for abnormal EEG showing frequent complex partial seizures and risk of generalized convulsions; last episode about 10 minutes ago on drive over

## 2023-01-24 NOTE — PATIENT PROFILE ADULT - MONEY FOR FOOD
Subjective:   Renita Navarrete is an 51 [employer] Influenza Vaccine Split 11/19/2011    Tdap 09/24/Sugey Horn ( The attending physician)    Sawyer Logan MD  Family Medicine Resident, PGY2 no

## 2023-01-24 NOTE — ED PROVIDER NOTE - CARE PLAN
From: Arianna Kam  To: Willis Rincon MD  Sent: 11/18/2017 12:38 PM CST  Subject: Ipratropium    The Ipratropium seems to be the better of the two nasal sprays Jody been using. I didn't realize I am out.   1 Principal Discharge DX:	Seizure

## 2023-01-24 NOTE — ED PROVIDER NOTE - CLINICAL SUMMARY MEDICAL DECISION MAKING FREE TEXT BOX
77-year-old male right-hand-dominant sent to the ER due to having positive findings on his EEG monitor.  Patient was seen at Dr. Briones's office and was found to have intermittent episodes of feeling strange has been having increasing frequency up to several times a day 25 years ago he lost consciousness had a  but never had any other episodes for the loss of consciousness recently, pt currently states "I feel fine" he has clear lungs soft abdomen, no lower leg edema, Plan for labs neuro eval and likely admission to the EMU for continued monitoring and AEDs given eeg shows L temporal sharps and concern for freq complex partial seizures

## 2023-01-24 NOTE — H&P ADULT - ASSESSMENT
77y M with Hx asthma, sinusitis, who presents w/ CC of abnormal EEG    Impression: few months of intermittent lightheaded episodes, found to have c/f L temporal seizures on EEG. Admitting to EMU.    Plan:  [] admit to EMU (Dr. Jhaveri)  [] check vEEG for 24h  [] monitor off AEDs for now  [] rescue medications: 1) Ativan 1mg 1x for GTC > 3 min, 2) Vimpat 200mg 1x  [] check UA, UTox  [] monitor CBC, BMP, Mg, Phos daily  [] check baseline prolactin, CK, lactate  [] neuro checks and vitals q4h  [] fall/seizure precautions    #asthma  c/w home albuterol 90 2x puffs q6h PRN for SOB/wheezing  c/w Breo Ellipta 200-25 daily    #sinusitis  c/w home Augmentin 500mg BID for 10 days (1/21-1/30)  c/w home montelukast 10mg daily  c/w home Flonase 2 sprays BID    Case will be staffed formally in AM. Recommendations complete once signed by attending.

## 2023-01-24 NOTE — H&P ADULT - NSHPPHYSICALEXAM_GEN_ALL_CORE
General:  Constitutional: Male, appears stated age, in no apparent distress  Head: Normocephalic; Eyes: clear sclera;   Extremities: No cyanosis; Skin: No omar edema of LE  Resp: breathing comfortably     Neurological (>12):  MS: Awake, alert.  Follows commands.   Language: Speech is clear, fluent, normal volume, good repetition,  comprehension, registration of words.  CNs: PERRL (R 3mm, L 3mm). VFF. EOMI. V1-3 intact LT, No facial asymmetry b/l, full. Hearing grossly normal (rubbing fingers) b/l. Tongue midline.     Motor: Normal muscle bulk & tone. No noted tremor.               Deltoid	Biceps	Triceps	   R	5	5	5	5		 	  L	5	5	5	5			  	H-Flex	K-Ext	D-Flex	P-Flex  R	5	5	5	5			 	   L	5	5	5	5		     Sensation: Intact to LT b/l. Cortical: Extinction on DSS (neglect): none  Reflexes R/L:  Biceps(C5) 2/2  BR(C6) 2/2   Triceps(C7)  2/2 Patellar(L4)   2/2   Toes: mute  Coordination: No dysmetria to FTN b/l UE  Gait: No postural instability. Normal stance.

## 2023-01-24 NOTE — H&P ADULT - ATTENDING COMMENTS
MRI brain reviewed, there is a lesion in the left amygdalar region (neoplastic vs inflammatory vs infectious).  Plan for LP and screening for underlying malignancy with CT chest/abd/pelvis.  Stereotypical events are suggestive of focal seizures, admitted for diagnostic video EEG monitoring.  Seizure precautions.    Ramesh Jhaveri MD  Neurology Attending Physician

## 2023-01-24 NOTE — ED PROVIDER NOTE - OBJECTIVE STATEMENT
77 M w/ R dominant presents to the emergency department with concern of having partial seizures.  Patient has known history of asthma and he is on steroids, montelukast, albuterol and Breo.  Patient was seen by Dr. KIM and had an EEG outpatient placed today.  Since the EEG was placed patient was found to have episodes concerning for partial complex seizure.  Patient upon arrival to the emergency department is aaox3

## 2023-01-24 NOTE — ASSESSMENT
[FreeTextEntry1] : Mr. Vuong is a 77-year-old who has been experiencing stereotypical episodes of brief altered level of consciousness since October.  There is no history of cardiac or vestibular symptomatology.  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  I have advised him regarding the need to immediately discontinue driving.  Further management will depend upon these results and his clinical course.

## 2023-01-25 ENCOUNTER — RESULT REVIEW (OUTPATIENT)
Age: 78
End: 2023-01-25

## 2023-01-25 LAB
AMPHET UR-MCNC: NEGATIVE — SIGNIFICANT CHANGE UP
APPEARANCE CSF: CLEAR — SIGNIFICANT CHANGE UP
APPEARANCE UR: CLEAR — SIGNIFICANT CHANGE UP
BACTERIA # UR AUTO: NEGATIVE — SIGNIFICANT CHANGE UP
BARBITURATES UR SCN-MCNC: NEGATIVE — SIGNIFICANT CHANGE UP
BENZODIAZ UR-MCNC: NEGATIVE — SIGNIFICANT CHANGE UP
BILIRUB UR-MCNC: NEGATIVE — SIGNIFICANT CHANGE UP
COCAINE METAB.OTHER UR-MCNC: NEGATIVE — SIGNIFICANT CHANGE UP
COLOR CSF: SIGNIFICANT CHANGE UP
COLOR SPEC: SIGNIFICANT CHANGE UP
DIFF PNL FLD: NEGATIVE — SIGNIFICANT CHANGE UP
EPI CELLS # UR: 0 /HPF — SIGNIFICANT CHANGE UP
GLUCOSE CSF-MCNC: 63 MG/DL — SIGNIFICANT CHANGE UP (ref 40–70)
GLUCOSE UR QL: NEGATIVE — SIGNIFICANT CHANGE UP
GRAM STN FLD: SIGNIFICANT CHANGE UP
HYALINE CASTS # UR AUTO: 1 /LPF — SIGNIFICANT CHANGE UP (ref 0–2)
KETONES UR-MCNC: SIGNIFICANT CHANGE UP
LDH CSF L TO P-CCNC: 25 U/L — SIGNIFICANT CHANGE UP
LDH FLD-CCNC: 25 U/L — SIGNIFICANT CHANGE UP
LEUKOCYTE ESTERASE UR-ACNC: NEGATIVE — SIGNIFICANT CHANGE UP
METHADONE UR-MCNC: NEGATIVE — SIGNIFICANT CHANGE UP
NEUTROPHILS # CSF: SIGNIFICANT CHANGE UP (ref 0–6)
NITRITE UR-MCNC: NEGATIVE — SIGNIFICANT CHANGE UP
NRBC NFR CSF: <1 /UL — SIGNIFICANT CHANGE UP (ref 0–5)
OPIATES UR-MCNC: NEGATIVE — SIGNIFICANT CHANGE UP
OXYCODONE UR-MCNC: NEGATIVE — SIGNIFICANT CHANGE UP
PCP SPEC-MCNC: SIGNIFICANT CHANGE UP
PCP UR-MCNC: NEGATIVE — SIGNIFICANT CHANGE UP
PH UR: 5.5 — SIGNIFICANT CHANGE UP (ref 5–8)
PROLACTIN SERPL-MCNC: 8.7 NG/ML — SIGNIFICANT CHANGE UP (ref 4.1–18.4)
PROT CSF-MCNC: 41 MG/DL — SIGNIFICANT CHANGE UP (ref 15–45)
PROT UR-MCNC: NEGATIVE — SIGNIFICANT CHANGE UP
RBC # CSF: 13 /UL — HIGH (ref 0–0)
RBC CASTS # UR COMP ASSIST: 2 /HPF — SIGNIFICANT CHANGE UP (ref 0–4)
SP GR SPEC: 1.03 — HIGH (ref 1.01–1.02)
SPECIMEN SOURCE: SIGNIFICANT CHANGE UP
THC UR QL: NEGATIVE — SIGNIFICANT CHANGE UP
TUBE TYPE: SIGNIFICANT CHANGE UP
UROBILINOGEN FLD QL: NEGATIVE — SIGNIFICANT CHANGE UP
WBC UR QL: 0 /HPF — SIGNIFICANT CHANGE UP (ref 0–5)

## 2023-01-25 PROCEDURE — 88108 CYTOPATH CONCENTRATE TECH: CPT

## 2023-01-25 PROCEDURE — A9585: CPT

## 2023-01-25 PROCEDURE — 71260 CT THORAX DX C+: CPT | Mod: 26

## 2023-01-25 PROCEDURE — 74177 CT ABD & PELVIS W/CONTRAST: CPT | Mod: 26

## 2023-01-25 PROCEDURE — 95720 EEG PHY/QHP EA INCR W/VEEG: CPT

## 2023-01-25 PROCEDURE — 88189 FLOWCYTOMETRY/READ 16 & >: CPT

## 2023-01-25 PROCEDURE — 76377 3D RENDER W/INTRP POSTPROCES: CPT

## 2023-01-25 PROCEDURE — 88108 CYTOPATH CONCENTRATE TECH: CPT | Mod: 26

## 2023-01-25 PROCEDURE — 70553 MRI BRAIN STEM W/O & W/DYE: CPT | Mod: MH

## 2023-01-25 PROCEDURE — 99222 1ST HOSP IP/OBS MODERATE 55: CPT

## 2023-01-25 RX ORDER — LEVETIRACETAM 250 MG/1
1000 TABLET, FILM COATED ORAL ONCE
Refills: 0 | Status: COMPLETED | OUTPATIENT
Start: 2023-01-25 | End: 2023-01-25

## 2023-01-25 RX ORDER — LEVETIRACETAM 250 MG/1
500 TABLET, FILM COATED ORAL
Refills: 0 | Status: DISCONTINUED | OUTPATIENT
Start: 2023-01-25 | End: 2023-01-26

## 2023-01-25 RX ORDER — LIDOCAINE HCL 20 MG/ML
30 VIAL (ML) INJECTION ONCE
Refills: 0 | Status: COMPLETED | OUTPATIENT
Start: 2023-01-25 | End: 2023-01-25

## 2023-01-25 RX ORDER — MONTELUKAST 4 MG/1
10 TABLET, CHEWABLE ORAL ONCE
Refills: 0 | Status: COMPLETED | OUTPATIENT
Start: 2023-01-25 | End: 2023-01-25

## 2023-01-25 RX ORDER — ENOXAPARIN SODIUM 100 MG/ML
40 INJECTION SUBCUTANEOUS EVERY 24 HOURS
Refills: 0 | Status: DISCONTINUED | OUTPATIENT
Start: 2023-01-25 | End: 2023-01-31

## 2023-01-25 RX ORDER — LANOLIN ALCOHOL/MO/W.PET/CERES
3 CREAM (GRAM) TOPICAL AT BEDTIME
Refills: 0 | Status: DISCONTINUED | OUTPATIENT
Start: 2023-01-25 | End: 2023-01-31

## 2023-01-25 RX ADMIN — ENOXAPARIN SODIUM 40 MILLIGRAM(S): 100 INJECTION SUBCUTANEOUS at 22:16

## 2023-01-25 RX ADMIN — LEVETIRACETAM 400 MILLIGRAM(S): 250 TABLET, FILM COATED ORAL at 15:26

## 2023-01-25 RX ADMIN — Medication 3 MILLIGRAM(S): at 22:16

## 2023-01-25 RX ADMIN — Medication 2 SPRAY(S): at 05:57

## 2023-01-25 RX ADMIN — Medication 2 SPRAY(S): at 18:13

## 2023-01-25 RX ADMIN — Medication 1 TABLET(S): at 05:56

## 2023-01-25 RX ADMIN — LEVETIRACETAM 500 MILLIGRAM(S): 250 TABLET, FILM COATED ORAL at 22:16

## 2023-01-25 RX ADMIN — Medication 30 MILLILITER(S): at 13:52

## 2023-01-25 RX ADMIN — Medication 1 TABLET(S): at 17:58

## 2023-01-25 RX ADMIN — MONTELUKAST 10 MILLIGRAM(S): 4 TABLET, CHEWABLE ORAL at 22:16

## 2023-01-25 RX ADMIN — Medication 3 MILLIGRAM(S): at 00:52

## 2023-01-25 NOTE — PROCEDURE NOTE - NSSITEPREP_SKIN_A_CORE
chlorhexidine Consent (Scalp)/Introductory Paragraph: The rationale for Mohs was explained to the patient and consent was obtained. The risks, benefits and alternatives to therapy were discussed in detail. Specifically, the risks of changes in hair growth pattern secondary to repair, infection, scarring, bleeding, prolonged wound healing, incomplete removal, allergy to anesthesia, nerve injury and recurrence were addressed. Prior to the procedure, the treatment site was clearly identified and confirmed by the patient. All components of Universal Protocol/PAUSE Rule completed.

## 2023-01-25 NOTE — EEG REPORT - NS EEG TEXT BOX
EPILEPSY MONITORING UNIT REPORT   [facility]: [address] [facility] [address], [place] [address], Ph#: [phone] [facility]: [address] [place] [address], [place] [address], Ph#: [phone] Office: [address] [facility] [address], [place] [address] Ph#: [phone]  [facility]: [address] [place] [address], Phone [phone] [place]  Office: [address] [place] [address], Phone [phone]  Patient Name: Aj Albarran   Age: 77 year, : 1945 MRN #: - [id number] [place]: [facility] [address] - Referring Physician: -  ER admit        OSH transfer  Study Started: 2023 20:24:23 AM Study Ended: 08:00 2023              Study Information:  EEG Recording Technique: The patient underwent continuous Video-EEG monitoring, using Telemetry System hardware on the XLTek Digital System. EEG and video data were stored on a computer hard drive with important events saved in digital archive files. The material was reviewed by a physician (electroencephalographer / epileptologist) on a daily basis. Gregor and seizure detection algorithms were utilized and reviewed. An EEG Technician attended to the patient, and was available throughout daytime work hours.  The epilepsy center neurologist was available in person or on call 24-hours per day.  EEG Placement and Labeling of Electrodes: The EEG was performed utilizing 20 channel referential EEG connections (coronal over temporal over parasagittal montage) using all standard 10-20 electrode placements with EKG, with additional electrodes placed in the inferior temporal region using the modified 10-10 montage electrode placements for elective admissions, or if deemed necessary. Recording was at a sampling rate of 256 samples per second per channel. Time synchronized digital video recording was done simultaneously with EEG recording. A low light infrared camera was used for low light recording.   History:   Home Antiepileptic Medication and Device   Interpretation:  Day 1 – 	Start: 2023  20:25:23     	End: 08:00 2023             	Duration: 11 hr  34 min  Daily EEG Visual Analysis  FINDINGS:  The background was continuous, symmetric, spontaneously variable and reactive. During wakefulness, the posterior dominant rhythm consisted of symmetric, well-modulated 9 Hz activity, with amplitude to 30 uV, that attenuated to eye opening.  Low amplitude frontal beta was noted in wakefulness. Anterior to posterior gradient is present. No Breach rhythms.   Background Slowing: No generalized background slowing was present.  Focal Slowing:  Intermittent left temporal theta and polymorphic delta activity   Sleep Background: Drowsiness was characterized by fragmentation, attenuation, and slowing of the background activity.   Sleep was characterized by the presence of symmetric sleep spindles and K-complexes.  Other Non-Epileptiform Findings: None were present.  Activation Procedures:  Hyperventilation was not performed.   Photic stimulation was performed and did not elicit any abnormalities.      Interictal Epileptiform Activity:  Left anterior temporal sharp waves discharges, Maximal at F7 and T7   Events: No seizures recorded.  Artifacts: Intermittent myogenic and movement artifacts were noted.  ECG: The heart rate on single channel ECG was predominantly between 70-80 BPM.  AEDs: None  EEG Summary:  Abnormal EEG in the awake, drowsy and asleep states.  -Intermittent left temporal theta and polymorphic delta activity  -Left anterior temporal sharp waves discharges, Maximal at F7 and T7    Impression/Clinical Correlate:  This is an abnormal EEG record.   Potential epileptic activity in left anterior temporal lobe  Left temporal lobe regional cortical dysfunction  No epileptiform pattern or seizures were seen.      Trey Ponce Neurology Resident  ------------------------------------ EEG Reading Room: [phone] On Call Service After Hours: [phone]    EPILEPSY MONITORING UNIT REPORT   [facility]: [address] [facility] [address], [place] [address], Ph#: [phone] [facility]: [address] [place], [place] [address], [place] [address], Ph#: [phone] Office: 1 [address] [address] Ph#: [phone]  [facility]: [address] [place] [address], Phone [phone] [place]  Office: [address] [place] [address], Phone [phone]  Patient Name: Aj Albarran   Age: 77 year, : 1945 MRN #: - [id number] [facility]: [facility] - Referring Physician: -  Dr. Thakkar via ED   Study Started: 2023 20:24:23 AM Study Ended: 08:00 2023              Study Information:  EEG Recording Technique: The patient underwent continuous Video-EEG monitoring, using Telemetry System hardware on the XLTek Digital System. EEG and video data were stored on a computer hard drive with important events saved in digital archive files. The material was reviewed by a physician (electroencephalographer / epileptologist) on a daily basis. Gregor and seizure detection algorithms were utilized and reviewed. An EEG Technician attended to the patient, and was available throughout daytime work hours.  The epilepsy center neurologist was available in person or on call 24-hours per day.  EEG Placement and Labeling of Electrodes: The EEG was performed utilizing 20 channel referential EEG connections (coronal over temporal over parasagittal montage) using all standard 10-20 electrode placements with EKG, with additional electrodes placed in the inferior temporal region using the modified 10-10 montage electrode placements for elective admissions, or if deemed necessary. Recording was at a sampling rate of 256 samples per second per channel. Time synchronized digital video recording was done simultaneously with EEG recording. A low light infrared camera was used for low light recording.   History: 74 years old male with history of frequent episodes of impaired awareness for the last several months.  Home Antiepileptic Medication and Device   Interpretation:  Day 1 – 	Start: 2023  20:25:23     	End: 08:00 2023             	Duration: 11 hr  34 min  Daily EEG Visual Analysis  FINDINGS:  The background was continuous, symmetric, spontaneously variable and reactive. During wakefulness, the posterior dominant rhythm consisted of symmetric, well-modulated 9 Hz activity, with amplitude to 30 uV, that attenuated to eye opening.  Low amplitude frontal beta was noted in wakefulness. Anterior to posterior gradient is present. No Breach rhythms.   Background Slowing: No generalized background slowing was present.  Focal Slowing:  Intermittent left temporal theta and polymorphic delta activity   Sleep Background: Drowsiness was characterized by fragmentation, attenuation, and slowing of the background activity.   Sleep was characterized by the presence of symmetric sleep spindles and K-complexes.  Other Non-Epileptiform Findings: None were present.  Activation Procedures:  Hyperventilation was not performed.   Photic stimulation was performed and did not elicit any abnormalities.      Interictal Epileptiform Activity:  Left anterior temporal sharp waves discharges, Maximal at F7 and T7   Events: No seizures recorded.  Artifacts: Intermittent myogenic and movement artifacts were noted.  ECG: The heart rate on single channel ECG was predominantly between 70-80 BPM.  AEDs: None  EEG Summary:  Abnormal EEG in the awake, drowsy and asleep states.  -Intermittent left temporal theta and polymorphic delta activity  -Left anterior temporal sharp waves discharges, Maximal at F7 and T7    Impression/Clinical Correlate:  This is an abnormal EEG record.   Potential epileptic activity in left anterior temporal lobe  Left temporal lobe regional cortical dysfunction  No epileptiform pattern or seizures were seen.      Trey Ponce Neurology Resident  ------------------------------------ EEG Reading Room: [phone] On Call Service After Hours: [phone]

## 2023-01-26 LAB
ANION GAP SERPL CALC-SCNC: 12 MMOL/L — SIGNIFICANT CHANGE UP (ref 5–17)
BUN SERPL-MCNC: 16 MG/DL — SIGNIFICANT CHANGE UP (ref 7–23)
CALCIUM SERPL-MCNC: 8.9 MG/DL — SIGNIFICANT CHANGE UP (ref 8.4–10.5)
CHLORIDE SERPL-SCNC: 100 MMOL/L — SIGNIFICANT CHANGE UP (ref 96–108)
CO2 SERPL-SCNC: 24 MMOL/L — SIGNIFICANT CHANGE UP (ref 22–31)
CREAT SERPL-MCNC: 0.9 MG/DL — SIGNIFICANT CHANGE UP (ref 0.5–1.3)
CSF PCR RESULT: SIGNIFICANT CHANGE UP
EGFR: 87 ML/MIN/1.73M2 — SIGNIFICANT CHANGE UP
GLUCOSE SERPL-MCNC: 87 MG/DL — SIGNIFICANT CHANGE UP (ref 70–99)
HCT VFR BLD CALC: 37.6 % — LOW (ref 39–50)
HGB BLD-MCNC: 11.7 G/DL — LOW (ref 13–17)
LABORATORY COMMENT REPORT: SIGNIFICANT CHANGE UP
MCHC RBC-ENTMCNC: 24.1 PG — LOW (ref 27–34)
MCHC RBC-ENTMCNC: 31.1 GM/DL — LOW (ref 32–36)
MCV RBC AUTO: 77.4 FL — LOW (ref 80–100)
NON-GYNECOLOGICAL CYTOLOGY STUDY: SIGNIFICANT CHANGE UP
NRBC # BLD: 0 /100 WBCS — SIGNIFICANT CHANGE UP (ref 0–0)
PLATELET # BLD AUTO: 260 K/UL — SIGNIFICANT CHANGE UP (ref 150–400)
POTASSIUM SERPL-MCNC: 4 MMOL/L — SIGNIFICANT CHANGE UP (ref 3.5–5.3)
POTASSIUM SERPL-SCNC: 4 MMOL/L — SIGNIFICANT CHANGE UP (ref 3.5–5.3)
RBC # BLD: 4.86 M/UL — SIGNIFICANT CHANGE UP (ref 4.2–5.8)
RBC # FLD: 15.7 % — HIGH (ref 10.3–14.5)
SODIUM SERPL-SCNC: 136 MMOL/L — SIGNIFICANT CHANGE UP (ref 135–145)
SOURCE HSV 1/2: SIGNIFICANT CHANGE UP
WBC # BLD: 7.77 K/UL — SIGNIFICANT CHANGE UP (ref 3.8–10.5)
WBC # FLD AUTO: 7.77 K/UL — SIGNIFICANT CHANGE UP (ref 3.8–10.5)

## 2023-01-26 PROCEDURE — 93010 ELECTROCARDIOGRAM REPORT: CPT

## 2023-01-26 PROCEDURE — 95720 EEG PHY/QHP EA INCR W/VEEG: CPT

## 2023-01-26 PROCEDURE — 99232 SBSQ HOSP IP/OBS MODERATE 35: CPT

## 2023-01-26 RX ORDER — LEVETIRACETAM 250 MG/1
1500 TABLET, FILM COATED ORAL ONCE
Refills: 0 | Status: COMPLETED | OUTPATIENT
Start: 2023-01-26 | End: 2023-01-26

## 2023-01-26 RX ORDER — MONTELUKAST 4 MG/1
10 TABLET, CHEWABLE ORAL DAILY
Refills: 0 | Status: DISCONTINUED | OUTPATIENT
Start: 2023-01-26 | End: 2023-01-31

## 2023-01-26 RX ORDER — LEVETIRACETAM 250 MG/1
1000 TABLET, FILM COATED ORAL
Refills: 0 | Status: DISCONTINUED | OUTPATIENT
Start: 2023-01-26 | End: 2023-01-27

## 2023-01-26 RX ADMIN — MONTELUKAST 10 MILLIGRAM(S): 4 TABLET, CHEWABLE ORAL at 22:52

## 2023-01-26 RX ADMIN — LEVETIRACETAM 400 MILLIGRAM(S): 250 TABLET, FILM COATED ORAL at 11:23

## 2023-01-26 RX ADMIN — Medication 1 TABLET(S): at 17:07

## 2023-01-26 RX ADMIN — Medication 2 SPRAY(S): at 05:24

## 2023-01-26 RX ADMIN — LEVETIRACETAM 1000 MILLIGRAM(S): 250 TABLET, FILM COATED ORAL at 17:07

## 2023-01-26 RX ADMIN — LEVETIRACETAM 500 MILLIGRAM(S): 250 TABLET, FILM COATED ORAL at 05:24

## 2023-01-26 RX ADMIN — ENOXAPARIN SODIUM 40 MILLIGRAM(S): 100 INJECTION SUBCUTANEOUS at 22:52

## 2023-01-26 RX ADMIN — Medication 3 MILLIGRAM(S): at 22:52

## 2023-01-26 RX ADMIN — Medication 1 TABLET(S): at 05:29

## 2023-01-26 NOTE — DISCHARGE NOTE PROVIDER - NSDCMRMEDTOKEN_GEN_ALL_CORE_FT
albuterol 90 mcg/inh inhalation aerosol: 2 puff(s) inhaled every 6 hours, As Needed  Augmentin 500 mg-125 mg oral tablet: 1 tab(s) orally 2 times a day  Breo Ellipta 200 mcg-25 mcg/inh inhalation powder: 1 puff(s) inhaled once a day  Flonase 50 mcg/inh nasal spray: 2 spray(s) nasal 2 times a day  montelukast 10 mg oral tablet: 1 tab(s) orally once a day   albuterol 90 mcg/inh inhalation aerosol: 2 puff(s) inhaled every 6 hours, As Needed  Breo Ellipta 200 mcg-25 mcg/inh inhalation powder: 1 puff(s) inhaled once a day  Flonase 50 mcg/inh nasal spray: 2 spray(s) nasal 2 times a day  lacosamide 200 mg oral tablet: 1 tab(s) orally 2 times a day MDD:400mg Daily  levETIRAcetam 750 mg oral tablet: 1 tab(s) orally 2 times a day  montelukast 10 mg oral tablet: 1 tab(s) orally once a day

## 2023-01-26 NOTE — EEG REPORT - NS EEG TEXT BOX
Day 2 – 	Start: 1/25/2023  08:00 	End: 01/26/2023   08:00  	Duration: 24 hr   Daily EEG Visual Analysis  FINDINGS:  The background was continuous, symmetric, spontaneously variable and reactive. During wakefulness, the posterior dominant rhythm consisted of symmetric, well-modulated 9 Hz activity, with amplitude to 30 uV, that attenuated to eye opening.  Low amplitude frontal beta was noted in wakefulness. Anterior to posterior gradient is present. No Breach rhythms.   Background Slowing: No generalized background slowing was present.  Focal Slowing:  Intermittent left temporal theta and polymorphic delta activity   Sleep Background: Drowsiness was characterized by fragmentation, attenuation, and slowing of the background activity.   Sleep was characterized by the presence of symmetric sleep spindles and K-complexes.  Other Non-Epileptiform Findings: None were present.  Activation Procedures:  Hyperventilation was not performed.   Photic stimulation was performed and did not elicit any abnormalities.      Interictal Epileptiform Activity:  Rare left anterior temporal sharp waves discharges, F7/T7 maximal   Events:  At least 11 focal electrographic seizures characterized by subtle temporal polymorphic delta evolving into theta frequencies, clinically events are stereotypical with acceleration of heart rate up to 120s (precedes electrographic & clinical changes), oral automatisms, followed by grimacing, cursing and hyperventilating. Patients awareness is preserved, triggers event button, and able to communicate with bedside staff. Episodes lasts 30-60 seconds. Seizures are mostly surface negative. Patient reports being amnestic to these events.   Artifacts: Intermittent myogenic and movement artifacts were noted.  ECG: The heart rate on single channel ECG was predominantly between  BPM. At times appears irregular during rest see EPOC taken 05:40 AM.      AEDs:  mg BID    EEG Summary:  Abnormal EEG in the awake, drowsy and asleep states.  - At least 11 focal electrographic seizures, subtle left temporal electrographic changes, tachycardia, clinically characterized by oral automatisms, grimacing, cursing and hyperventilation. Awareness is preserved.  - Rare left anterior temporal sharp waves discharges, F7/T7 maximal - Intermittent left temporal theta and polymorphic delta activity    Impression/Clinical Correlate:  This is an abnormal EEG record.   At eleven focal electrographic left temporal seizures, characterized by oral automatisms, cursing and tachycardia.   Potential epileptic activity in left anterior temporal region.   Regional cortical dysfunction in the left temporal area.

## 2023-01-26 NOTE — DISCHARGE NOTE PROVIDER - CARE PROVIDER_API CALL
Hermilo Davidson (MD)  Neurosurgery  [address] [facility], [address]  [place] [address]  Phone: [phone]  Fax: [phone]  Follow Up Time: Routine   Hermilo Davidsno)  Neurosurgery  [address] [facility], [address]  [place] [address]  Phone: [phone]  Fax: [phone]  Follow Up Time: Routine    Jovanny Sweet (MD)  Clinical Neurophysiology; EEGEpilepsy; Neurology  [address] [facility], [address]  [place] [address]  Phone: [phone]  Fax: [phone]  Follow Up Time:     Calry Helm)  Neurology  [address] [place] [address]  Phone: [phone]  Fax: [phone]  Follow Up Time:

## 2023-01-26 NOTE — PROVIDER CONTACT NOTE (OTHER) - ASSESSMENT
pt had a seizure activity lasting 2 seconds, pt is alert, appears flushed, gasp followed by stare. pt reoriented pt return to baseline. Stat Keppra ordered in progress.

## 2023-01-26 NOTE — DISCHARGE NOTE PROVIDER - PROVIDER TOKENS
PROVIDER:[TOKEN:[[id number]],FOLLOWUP:[Routine]] PROVIDER:[TOKEN:[[id number]],FOLLOWUP:[Routine]],PROVIDER:[TOKEN:[[id number]]],PROVIDER:[TOKEN:[[id number]]]

## 2023-01-26 NOTE — PROGRESS NOTE ADULT - SUBJECTIVE AND OBJECTIVE BOX
*************************************  NEUROLOGY PROGRESS NOTE  **************************************    VANESSA CASTRO  Male  MRN-[id number]    Subjective: no acute complaints.      VITAL SIGNS:  Vital Signs Last 24 Hrs  T(C): 36.6 (26 Jan 2023 09:09), Max: 37.1 (25 Jan 2023 12:15)  T(F): 97.9 (26 Jan 2023 09:09), Max: 98.8 (25 Jan 2023 12:15)  HR: 82 (26 Jan 2023 09:09) (62 - 83)  BP: 134/72 (26 Jan 2023 09:09) (105/68 - 159/76)  BP(mean): --  RR: 18 (26 Jan 2023 09:09) (18 - 18)  SpO2: 96% (26 Jan 2023 09:09) (95% - 96%)    Parameters below as of 26 Jan 2023 04:38  Patient On (Oxygen Delivery Method): room air    PHYSICAL EXAMINATION:  General: Well-developed, well nourished, in no acute distress.  Eyes: Conjunctiva and sclera clear.  Neck: supple, nontender, good ROM  Lungs: no respiratory distress  Neurologic:  - Mental Status:  Alert, awake, oriented to person, place, and time; Speech is fluent with intact naming, repetition, and comprehension  - Cranial Nerves II-XII:  VFF, No nystagmus or APD noted, EOMI, PERRLA, V1-V3 intact, no facial asymmetry, t/p midline, SCM/trap intact.  - Motor:  Strength is 5/5 throughout.  There is no pronator drift.  Normal muscle bulk and tone throughout.  - Reflexes:  2+ and symmetric at the biceps, triceps, brachioradialis, knees, and ankles.  Plantar responses flexor.  - Sensory:  Intact to light touch, pin prick, vibration, and joint-position sense throughout.  - Coordination:  Finger-nose-finger and heel-knee-shin intact without dysmetria.  Rapid alternating hand movements intact.  - Gait:   Normal steps, base, arm swing, and turning.  Heel and toe walking are normal.  Tandem gait is normal.  Romberg testing is negative.    LABS:                          11.7   7.77  )-----------( 260      ( 26 Jan 2023 06:12 )             37.6     01-26    136  |  100  |  16  ----------------------------<  87  4.0   |  24  |  0.90    Ca    8.9      26 Jan 2023 06:12    TPro  7.2  /  Alb  4.1  /  TBili  0.3  /  DBili  x   /  AST  17  /  ALT  14  /  AlkPhos  86  01-24    PT/INR - ( 24 Jan 2023 15:50 )   PT: 13.2 sec;   INR: 1.15 ratio         PTT - ( 24 Jan 2023 15:50 )  PTT:29.2 sec      RADIOLOGY & ADDITIONAL STUDIES:      MRI Brain MRN [id number] showed a lesion in the left amygdalar region (neoplastic vs inflammatory vs infectious)    < from: CT Abdomen and Pelvis w/ Oral Cont and w/ IV Cont (01.25.23 @ 21:58) >    INTERPRETATION:  CLINICAL INFORMATION: Temporal lobe epilepsy. Evaluate   for paraneoplastic etiology.    COMPARISON: None.    CONTRAST/COMPLICATIONS:  IV Contrast: 90 cc's of Omnipaque 350 administered. 10 cc's were   discarded.  Oral Contrast: None  Complications: None reported.    PROCEDURE:  CT of the Chest, Abdomen and Pelvis was performed.  Sagittal and coronal reformats were performed.    FINDINGS:  CHEST:  LUNGS AND LARGE AIRWAYS: Patent central airways. Linear and subsegmental   atelectasis within the bilateral lower lobes. Few subpleural nodulesin   the right upper and middle lobes measuring up to 5 mm (3, 83), probably   intrapulmonary lymph nodes.  PLEURA: No pleural effusion.  VESSELS: Atherosclerotic changes of the aorta and coronary arteries.  HEART: Heart size is normal. No pericardial effusion.  MEDIASTINUM AND GLADYS: No lymphadenopathy.  CHEST WALL AND LOWER NECK: Within normal limits.    ABDOMEN AND PELVIS:  LIVER: Within normal limits.  BILE DUCTS: Normal caliber.  GALLBLADDER: Within normal limits.  SPLEEN: Within normal limits.  PANCREAS: Within normal limits.  ADRENALS: Indeterminant 1.2 cm right adrenal nodule.  KIDNEYS/URETERS: Bilateral renal subcentimeter hypodensities too small to   characterize. No hydronephrosis.    BLADDER: Within normal limits.  REPRODUCTIVE ORGANS: Prostate is enlarged with metallic densities.    BOWEL: No bowel obstruction. Appendix is normal. Colonic diverticulosis.  PERITONEUM: No ascites.  VESSELS: Atherosclerotic changes.  RETROPERITONEUM/LYMPH NODES: No lymphadenopathy.  ABDOMINAL WALL: Within normal limits.  BONES: Degenerative changes.    IMPRESSION:  Few subpleural pulmonary nodules measuring up to 5 mm, probably   intrapulmonary lymph nodes. Follow-up CT chest can be obtained in 12   months or as clinically indicated.    Indeterminant 1.2 cm right adrenal nodule.    < end of copied text >  EEG Summary:    Abnormal EEG in the awake, drowsy and asleep states.    - At least 11 focal electrographic seizures, subtle left temporal electrographic changes, tachycardia, clinically characterized by oral automatisms, grimacing, cursing and hyperventilation. Awareness is preserved.   - Rare left anterior temporal sharp waves discharges, F7/T7 maximal  - Intermittent left temporal theta and polymorphic delta activity       Impression/Clinical Correlate:    This is an abnormal EEG record.     At eleven focal electrographic left temporal seizures, characterized by oral automatisms, cursing and tachycardia.    Potential epileptic activity in left anterior temporal region.    Regional cortical dysfunction in the left temporal area.    *************************************  NEUROLOGY PROGRESS NOTE  **************************************    VANESSA CASTRO  Male  MRN-[id number]    Subjective: no acute complaints.      VITAL SIGNS:  Vital Signs Last 24 Hrs  T(C): 36.6 (26 Jan 2023 09:09), Max: 37.1 (25 Jan 2023 12:15)  T(F): 97.9 (26 Jan 2023 09:09), Max: 98.8 (25 Jan 2023 12:15)  HR: 82 (26 Jan 2023 09:09) (62 - 83)  BP: 134/72 (26 Jan 2023 09:09) (105/68 - 159/76)  BP(mean): --  RR: 18 (26 Jan 2023 09:09) (18 - 18)  SpO2: 96% (26 Jan 2023 09:09) (95% - 96%)    Parameters below as of 26 Jan 2023 04:38  Patient On (Oxygen Delivery Method): room air    PHYSICAL EXAMINATION:  General: Well-developed, well nourished, in no acute distress.  Eyes: Conjunctiva and sclera clear.  Neck: supple, nontender, good ROM  Lungs: no respiratory distress  Neurologic:  - Mental Status:  Alert, awake, oriented to person, place, and time; Speech is fluent with intact naming, repetition, and comprehension  - Cranial Nerves II-XII:  VFF, No nystagmus or APD noted, EOMI, PERRLA, V1-V3 intact, no facial asymmetry, t/p midline, SCM/trap intact.  - Motor:  Strength is 5/5 throughout.  There is no pronator drift.  Normal muscle bulk and tone throughout.  - Reflexes:  2+ and symmetric at the biceps, triceps, brachioradialis, knees, and ankles.  Plantar responses flexor.  - Sensory:  Intact to light touch, pin prick, vibration, and joint-position sense throughout.  - Coordination:  Finger-nose-finger and heel-knee-shin intact without dysmetria.  Rapid alternating hand movements intact.  - Gait:   Normal steps, base, arm swing, and turning.  Heel and toe walking are normal.  Tandem gait is normal.  Romberg testing is negative.    LABS:                          11.7   7.77  )-----------( 260      ( 26 Jan 2023 06:12 )             37.6     01-26    136  |  100  |  16  ----------------------------<  87  4.0   |  24  |  0.90    Ca    8.9      26 Jan 2023 06:12    TPro  7.2  /  Alb  4.1  /  TBili  0.3  /  DBili  x   /  AST  17  /  ALT  14  /  AlkPhos  86  01-24    PT/INR - ( 24 Jan 2023 15:50 )   PT: 13.2 sec;   INR: 1.15 ratio         PTT - ( 24 Jan 2023 15:50 )  PTT:29.2 sec      RADIOLOGY & ADDITIONAL STUDIES:        INTERPRETATION:  Exam Date: 1/24/2023 10:54 AM    MR brain with and without gadolinium    CLINICAL INFORMATION:  SEIZURE    TECHNIQUE: Multiplanar imaging of the brain with seizure protocol was   obtained with and without IV contrast. 7 cc of Gadavist was administered.   0.5 cc was discarded.    FINDINGS:   No prior similar studies are available for review.    There is abnormal T2/FLAIR hyperintense signal within the medial left   temporal lobe in the region of the amygdala and hippocampus. There is   mild mass effect on the left temporal horn. There is no associated   enhancement or susceptibility signal.    Incidentally identified is abnormal FLAIR hyperintense signal and   enlargement of the left ophthalmic vein. Clinical correlation is needed,   and further imaging of the orbits and cavernous sinus may be indicated.    There is no evidence of acute infarct, or hemorrhage. There are scattered   foci of FLAIR hyperintensity in the periventricular and subcortical white   matter of the bilateral cerebri, compatible with mild chronic   microvascular ischemic changes. The ventricles, sulci and basal cisterns   are prominent, compatible with age related generalized cerebral volume   loss.    The vertebral and internal carotid arteries demonstrate expected flow   voids indicating their patency.    Extensive mucosal thickening throughout the paranasal sinuses, compatible   with severe sinusitis. There are polyps within the bilateral posterior   nasal cavities.    IMPRESSION:    Abnormal T2/FLAIR hyperintense signal within the medial left temporal   lobe in the region of the amygdala and hippocampus. There ismild mass   effect on the left temporal horn. There is no associated enhancement or   susceptibility signal.  Differential diagnosis includes autoimmune   encephalitis or paraneoplastic encephalitis, and less likely to include   herpes or other viralencephalitis given the long-standing clinical   symptoms.    Incidentally identified is abnormal FLAIR hyperintense signal and   enlargement of the left ophthalmic vein. Clinical correlation is needed,   and further imaging of the orbits and cavernous sinus may be indicated.    Extensive mucosal thickening throughout the paranasal sinuses, compatible   with severe sinusitis of indeterminate age.    Mild periventricular and subcortical white matter chronic microvascular   ischemic changes.      < from: CT Abdomen and Pelvis w/ Oral Cont and w/ IV Cont (01.25.23 @ 21:58) >    INTERPRETATION:  CLINICAL INFORMATION: Temporal lobe epilepsy. Evaluate   for paraneoplastic etiology.    COMPARISON: None.    CONTRAST/COMPLICATIONS:  IV Contrast: 90 cc's of Omnipaque 350 administered. 10 cc's were   discarded.  Oral Contrast: None  Complications: None reported.    PROCEDURE:  CT of the Chest, Abdomen and Pelvis was performed.  Sagittal and coronal reformats were performed.    FINDINGS:  CHEST:  LUNGS AND LARGE AIRWAYS: Patent central airways. Linear and subsegmental   atelectasis within the bilateral lower lobes. Few subpleural nodulesin   the right upper and middle lobes measuring up to 5 mm (3, 83), probably   intrapulmonary lymph nodes.  PLEURA: No pleural effusion.  VESSELS: Atherosclerotic changes of the aorta and coronary arteries.  HEART: Heart size is normal. No pericardial effusion.  MEDIASTINUM AND GLADYS: No lymphadenopathy.  CHEST WALL AND LOWER NECK: Within normal limits.    ABDOMEN AND PELVIS:  LIVER: Within normal limits.  BILE DUCTS: Normal caliber.  GALLBLADDER: Within normal limits.  SPLEEN: Within normal limits.  PANCREAS: Within normal limits.  ADRENALS: Indeterminant 1.2 cm right adrenal nodule.  KIDNEYS/URETERS: Bilateral renal subcentimeter hypodensities too small to   characterize. No hydronephrosis.    BLADDER: Within normal limits.  REPRODUCTIVE ORGANS: Prostate is enlarged with metallic densities.    BOWEL: No bowel obstruction. Appendix is normal. Colonic diverticulosis.  PERITONEUM: No ascites.  VESSELS: Atherosclerotic changes.  RETROPERITONEUM/LYMPH NODES: No lymphadenopathy.  ABDOMINAL WALL: Within normal limits.  BONES: Degenerative changes.    IMPRESSION:  Few subpleural pulmonary nodules measuring up to 5 mm, probably   intrapulmonary lymph nodes. Follow-up CT chest can be obtained in 12   months or as clinically indicated.    Indeterminant 1.2 cm right adrenal nodule.    < end of copied text >  EEG Summary:    Abnormal EEG in the awake, drowsy and asleep states.    - At least 11 focal electrographic seizures, subtle left temporal electrographic changes, tachycardia, clinically characterized by oral automatisms, grimacing, cursing and hyperventilation. Awareness is preserved.   - Rare left anterior temporal sharp waves discharges, F7/T7 maximal  - Intermittent left temporal theta and polymorphic delta activity       Impression/Clinical Correlate:    This is an abnormal EEG record.     At eleven focal electrographic left temporal seizures, characterized by oral automatisms, cursing and tachycardia.    Potential epileptic activity in left anterior temporal region.    Regional cortical dysfunction in the left temporal area.

## 2023-01-26 NOTE — PROGRESS NOTE ADULT - ASSESSMENT
7y M with Hx asthma, sinusitis, who presents w/ CC of abnormal EEG    Impression: few months of intermittent lightheaded episodes, found to have c/f L temporal seizures 2/2 focal seizures. 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 (neoplastic vs inflammatory vs infectious)    Plan:    Neuro  [] C/w vEEG   [] Loaded keppra 1500mg, followed by maintenance 1000 mg bid  [] S/p LP on 1/25/23; neg csf protein, glucose; pending inflammatory/infectious/autoimmune panel  pending ACE, HSV, autoimmune, paraneoplastic panel, cytology and cytopathology   [] rescue medications: 1) Ativan 1mg 1x for GTC > 3 min, 2) Vimpat 200mg 1x  [] neuro checks and vitals q4h  [] fall/seizure precautions    # Pulmonary nodules found on CT CAP  few, small 5mm   f/u CT in 3-6 months (4/23-7/23) with pulmonology (# [phone])  no further inpatient work up    #asthma  c/w home albuterol 90 2x puffs q6h PRN for SOB/wheezing  c/w Breo Ellipta 200-25 daily    #sinusitis  c/w home Augmentin 500mg BID for 10 days (1/21-1/30)  c/w home montelukast 10mg daily  c/w home Flonase 2 sprays BID    Case discussed and staffed with Dr. Jhaveri     76yo M with Hx asthma, sinusitis, who presents w/ CC of abnormal EEG  MRI Brain MRN [id number] showed a lesion in the left amygdalar region (neoplastic vs inflammatory vs infectious)    Impression: few months of intermittent lightheaded episodes, found to have c/f L temporal seizures 2/2 focal seizures. 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 (neoplastic vs inflammatory vs infectious)    Plan:    Neuro  [] C/w vEEG   [] Loaded keppra 1500mg, followed by maintenance 1000 mg bid  [] S/p LP on 1/25/23; neg csf protein, glucose; pending inflammatory/infectious/autoimmune panel  pending ACE, HSV, autoimmune, paraneoplastic panel, cytology and cytopathology   [] rescue medications: 1) Ativan 1mg 1x for GTC > 3 min, 2) Vimpat 200mg 1x  [] neuro checks and vitals q4h  [] fall/seizure precautions    # Pulmonary nodules found on CT CAP  few, small 5mm   f/u CT in 3-6 months (4/23-7/23) with pulmonology (# [phone])  no further inpatient work up    #asthma  c/w home albuterol 90 2x puffs q6h PRN for SOB/wheezing  c/w Breo Ellipta 200-25 daily    #sinusitis  c/w home Augmentin 500mg BID for 10 days (1/21-1/30)  c/w home montelukast 10mg daily  c/w home Flonase 2 sprays BID    Case discussed and staffed with Dr. Jhaveri

## 2023-01-26 NOTE — DISCHARGE NOTE PROVIDER - HOSPITAL COURSE
78yo M with Hx asthma and sinusitis presented to EMU due to abnormal outpatient EEG. Outpatient EEG showed L temporal lobe seizures and MRI showed L amygdalar lesion (neoplastic vs inflammatory vs infectious). EEG on 1/28 revealed at least 6 electrographic L temporal lobe seizures, presented as oral automatism, cursing, and tachycardia.   Admitted to EMU on 1/24/23 for event characterization. Patient had a Lumbar puncture on 1/25/23 and preliminary results are normal, CT Chest/Abdomen/Pelvis performed for suspicion of paraneoplastic process.    CSF:  Total Nucleated Cell Count, CSF: <1 /uL (01-25-23 @ 14:54)  RBC Count - Spinal Fluid: 13 /uL (01-25-23 @ 14:54)  Protein, CSF: 41 mg/dL (01-25-23 @ 14:50)  Glucose: 63      CT Chest w/ IV Cont (01.25.23 @ 21:58)   Few subpleural pulmonary nodules measuring up to 5 mm, probably   intrapulmonary lymph nodes. Follow-up CT chest can be obtained in 12   months or as clinically indicated.  Indeterminant 1.2 cm right adrenal nodule.      EEG report 1/29/23  Abnormal EEG in the awake, drowsy and asleep states.  At least 5 electrographic seizures, at times seen with subtle left frontotemporal electrographic changes, tachycardia, clinically characterized by oral automatisms, grimacing, cursing and hyperventilation. Awareness is preserved.   Rare left anterior temporal sharp waves discharges, F7/T7 maximal  Intermittent left temporal theta and polymorphic delta activity   Impression/Clinical Correlate:  This is an abnormal EEG record.   At least 5 electrographic left temporal seizures, characterized by oral automatisms, cursing and tachycardia.  Last seizure at 17:23 PM on 1/28/23.   Potential epileptic activity in left anterior temporal area.    Regional cortical dysfunction in the left temporal region.   Multiple push button events with no epileptiform EEG abnormalities noted, at times patient is hyperventilating and tachycardic, concerning for possible surface negative seizures.     Impression: few months of intermittent lightheaded episodes, found to have c/f L temporal seizures 2/2 focal seizures. 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 (neoplastic vs inflammatory vs infectious)      During admission patients seizure activity improved with Vimpat which was gradually titrated up to 200mg PO BID while Keppra was tapered with the reasoning that the patients seizures were electrographically and clinically responding much better to Vimpat that Keppra. Attempting to switch to Vimpat monotherapy.    76 YO M with Hx asthma and sinusitis presented to EMU due to abnormal outpatient EEG. Outpatient EEG showed L temporal lobe seizures and MRI showed L amygdalar lesion (neoplastic vs inflammatory vs infectious). EEG on 1/28 revealed at least 6 electrographic L temporal lobe seizures, presented as oral automatism, cursing, and tachycardia. Admitted to EMU on 1/24/23 for event characterization. Patient had a Lumbar puncture on 1/25/23 and preliminary results are normal, CT Chest/Abdomen/Pelvis performed for suspicion of paraneoplastic process.    CSF:  Total Nucleated Cell Count, CSF: <1 /uL (01-25-23 @ 14:54)  RBC Count - Spinal Fluid: 13 /uL (01-25-23 @ 14:54)  Protein, CSF: 41 mg/dL (01-25-23 @ 14:50)  Glucose: 63    CT Chest w/ IV Cont (01.25.23 @ 21:58)   Few subpleural pulmonary nodules measuring up to 5 mm, probably   intrapulmonary lymph nodes. Follow-up CT chest can be obtained in 12   months or as clinically indicated.  Indeterminant 1.2 cm right adrenal nodule.      EEG report 1/29/23  Abnormal EEG in the awake, drowsy and asleep states.  At least 5 electrographic seizures, at times seen with subtle left frontotemporal electrographic changes, tachycardia, clinically characterized by oral automatisms, grimacing, cursing and hyperventilation. Awareness is preserved.   Rare left anterior temporal sharp waves discharges, F7/T7 maximal  Intermittent left temporal theta and polymorphic delta activity   Impression/Clinical Correlate:  This is an abnormal EEG record.   At least 5 electrographic left temporal seizures, characterized by oral automatisms, cursing and tachycardia.  Last seizure at 17:23 PM on 1/28/23.   Potential epileptic activity in left anterior temporal area.    Regional cortical dysfunction in the left temporal region.   Multiple push button events with no epileptiform EEG abnormalities noted, at times patient is hyperventilating and tachycardic, concerning for possible surface negative seizures.     Impression: few months of intermittent lightheaded episodes, found to have c/f L temporal seizures 2/2 focal seizures. 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 (neoplastic vs inflammatory vs infectious)    During admission patients seizure activity improved with Vimpat which was gradually titrated up to 200mg PO BID while Keppra was tapered with the reasoning that the patients seizures were electrographically and clinically responding much better to Vimpat that Keppra. Attempting to switch to Vimpat monotherapy. Follow up final MRI result. Continue to document episodes of lightheadedness and bring into next appointment.     1/31: Case discussed with Dr. Sweet patient stable for discharge.

## 2023-01-26 NOTE — DISCHARGE NOTE PROVIDER - NSDCCPCAREPLAN_GEN_ALL_CORE_FT
PRINCIPAL DISCHARGE DIAGNOSIS  Diagnosis: Seizure  Assessment and Plan of Treatment: Follow up with your Primary Care Physician within the next 2-3 days  Follow up with your Neurologist for routine visit  Continue medications as reconciled  Contact your Neurologist, Primary Care Provider or report to the Emergency Room if you experience new or worsening symptoms

## 2023-01-26 NOTE — DISCHARGE NOTE PROVIDER - CARE PROVIDERS DIRECT ADDRESSES
,[email or web address]

## 2023-01-27 LAB
ACE SERPL-CCNC: 19 U/L — SIGNIFICANT CHANGE UP (ref 14–82)
ANION GAP SERPL CALC-SCNC: 10 MMOL/L — SIGNIFICANT CHANGE UP (ref 5–17)
BUN SERPL-MCNC: 17 MG/DL — SIGNIFICANT CHANGE UP (ref 7–23)
CALCIUM SERPL-MCNC: 9.2 MG/DL — SIGNIFICANT CHANGE UP (ref 8.4–10.5)
CHLORIDE SERPL-SCNC: 100 MMOL/L — SIGNIFICANT CHANGE UP (ref 96–108)
CO2 SERPL-SCNC: 27 MMOL/L — SIGNIFICANT CHANGE UP (ref 22–31)
CREAT SERPL-MCNC: 1.15 MG/DL — SIGNIFICANT CHANGE UP (ref 0.5–1.3)
EGFR: 65 ML/MIN/1.73M2 — SIGNIFICANT CHANGE UP
GLUCOSE SERPL-MCNC: 95 MG/DL — SIGNIFICANT CHANGE UP (ref 70–99)
HCT VFR BLD CALC: 39.2 % — SIGNIFICANT CHANGE UP (ref 39–50)
HGB BLD-MCNC: 12.1 G/DL — LOW (ref 13–17)
MCHC RBC-ENTMCNC: 24 PG — LOW (ref 27–34)
MCHC RBC-ENTMCNC: 30.9 GM/DL — LOW (ref 32–36)
MCV RBC AUTO: 77.8 FL — LOW (ref 80–100)
NRBC # BLD: 0 /100 WBCS — SIGNIFICANT CHANGE UP (ref 0–0)
PLATELET # BLD AUTO: 274 K/UL — SIGNIFICANT CHANGE UP (ref 150–400)
POTASSIUM SERPL-MCNC: 4.7 MMOL/L — SIGNIFICANT CHANGE UP (ref 3.5–5.3)
POTASSIUM SERPL-SCNC: 4.7 MMOL/L — SIGNIFICANT CHANGE UP (ref 3.5–5.3)
RBC # BLD: 5.04 M/UL — SIGNIFICANT CHANGE UP (ref 4.2–5.8)
RBC # FLD: 15.9 % — HIGH (ref 10.3–14.5)
SODIUM SERPL-SCNC: 137 MMOL/L — SIGNIFICANT CHANGE UP (ref 135–145)
WBC # BLD: 7.19 K/UL — SIGNIFICANT CHANGE UP (ref 3.8–10.5)
WBC # FLD AUTO: 7.19 K/UL — SIGNIFICANT CHANGE UP (ref 3.8–10.5)

## 2023-01-27 PROCEDURE — 95720 EEG PHY/QHP EA INCR W/VEEG: CPT

## 2023-01-27 RX ORDER — LACOSAMIDE 50 MG/1
200 TABLET ORAL ONCE
Refills: 0 | Status: DISCONTINUED | OUTPATIENT
Start: 2023-01-27 | End: 2023-01-27

## 2023-01-27 RX ORDER — LACOSAMIDE 50 MG/1
100 TABLET ORAL
Refills: 0 | Status: DISCONTINUED | OUTPATIENT
Start: 2023-01-27 | End: 2023-01-29

## 2023-01-27 RX ORDER — LEVETIRACETAM 250 MG/1
1500 TABLET, FILM COATED ORAL
Refills: 0 | Status: DISCONTINUED | OUTPATIENT
Start: 2023-01-27 | End: 2023-01-30

## 2023-01-27 RX ADMIN — LEVETIRACETAM 1500 MILLIGRAM(S): 250 TABLET, FILM COATED ORAL at 17:29

## 2023-01-27 RX ADMIN — Medication 3 MILLIGRAM(S): at 21:43

## 2023-01-27 RX ADMIN — LACOSAMIDE 100 MILLIGRAM(S): 50 TABLET ORAL at 17:31

## 2023-01-27 RX ADMIN — Medication 1 TABLET(S): at 06:08

## 2023-01-27 RX ADMIN — LACOSAMIDE 200 MILLIGRAM(S): 50 TABLET ORAL at 11:47

## 2023-01-27 RX ADMIN — ENOXAPARIN SODIUM 40 MILLIGRAM(S): 100 INJECTION SUBCUTANEOUS at 21:42

## 2023-01-27 RX ADMIN — LEVETIRACETAM 1000 MILLIGRAM(S): 250 TABLET, FILM COATED ORAL at 06:07

## 2023-01-27 RX ADMIN — MONTELUKAST 10 MILLIGRAM(S): 4 TABLET, CHEWABLE ORAL at 21:42

## 2023-01-27 RX ADMIN — Medication 1 TABLET(S): at 17:29

## 2023-01-27 NOTE — PROGRESS NOTE ADULT - SUBJECTIVE AND OBJECTIVE BOX
*************************************  NEUROLOGY PROGRESS NOTE  **************************************    VANESSA CASTRO  Male  [id number]    Subjective: no overnight events       VITAL SIGNS:  Vital Signs Last 24 Hrs  T(C): 37 (27 Jan 2023 05:20), Max: 37.2 (26 Jan 2023 20:58)  T(F): 98.6 (27 Jan 2023 05:20), Max: 99 (26 Jan 2023 20:58)  HR: 60 (27 Jan 2023 05:20) (60 - 88)  BP: 101/52 (27 Jan 2023 05:20) (101/52 - 134/72)  BP(mean): --  RR: 18 (27 Jan 2023 05:20) (18 - 18)  SpO2: 94% (27 Jan 2023 05:20) (94% - 96%)    Parameters below as of 27 Jan 2023 05:20  Patient On (Oxygen Delivery Method): room air        PHYSICAL EXAMINATION:  General: Well-developed, well nourished, in no acute distress.  Eyes: Conjunctiva and sclera clear.  Neck: supple, nontender, good ROM  Lungs: no respiratory distress  Neurologic:  - Mental Status:  Alert, awake, oriented to person, place, and time; Speech is fluent with intact naming, repetition, and comprehension  - Cranial Nerves II-XII:  VFF, No nystagmus or APD noted, EOMI, PERRLA, V1-V3 intact, no facial asymmetry, t/p midline, SCM/trap intact.  - Motor:  Strength is 5/5 throughout.  There is no pronator drift.  Normal muscle bulk and tone throughout.  - Reflexes:  2+ and symmetric at the biceps, triceps, brachioradialis, knees, and ankles.  Plantar responses flexor.  - Sensory:  Intact to light touch, pin prick, vibration, and joint-position sense throughout.  - Coordination:  Finger-nose-finger and heel-knee-shin intact without dysmetria.  Rapid alternating hand movements intact.  - Gait:   Normal steps, base, arm swing, and turning.  Heel and toe walking are normal.  Tandem gait is normal.  Romberg testing is negative.    LABS:                          12.1   7.19  )-----------( 274      ( 27 Jan 2023 06:27 )             39.2     01-27    137  |  100  |  17  ----------------------------<  95  4.7   |  27  |  1.15    Ca    9.2      27 Jan 2023 06:27            RADIOLOGY & ADDITIONAL STUDIES:        INTERPRETATION:  Exam Date: 1/24/2023 10:54 AM    MR brain with and without gadolinium    CLINICAL INFORMATION:  SEIZURE    TECHNIQUE: Multiplanar imaging of the brain with seizure protocol was   obtained with and without IV contrast. 7 cc of Gadavist was administered.   0.5 cc was discarded.    FINDINGS:   No prior similar studies are available for review.    There is abnormal T2/FLAIR hyperintense signal within the medial left   temporal lobe in the region of the amygdala and hippocampus. There is   mild mass effect on the left temporal horn. There is no associated   enhancement or susceptibility signal.    Incidentally identified is abnormal FLAIR hyperintense signal and   enlargement of the left ophthalmic vein. Clinical correlation is needed,   and further imaging of the orbits and cavernous sinus may be indicated.    There is no evidence of acute infarct, or hemorrhage. There are scattered   foci of FLAIR hyperintensity in the periventricular and subcortical white   matter of the bilateral cerebri, compatible with mild chronic   microvascular ischemic changes. The ventricles, sulci and basal cisterns   are prominent, compatible with age related generalized cerebral volume   loss.    The vertebral and internal carotid arteries demonstrate expected flow   voids indicating their patency.    Extensive mucosal thickening throughout the paranasal sinuses, compatible   with severe sinusitis. There are polyps within the bilateral posterior   nasal cavities.    IMPRESSION:    Abnormal T2/FLAIR hyperintense signal within the medial left temporal   lobe in the region of the amygdala and hippocampus. There ismild mass   effect on the left temporal horn. There is no associated enhancement or   susceptibility signal.  Differential diagnosis includes autoimmune   encephalitis or paraneoplastic encephalitis, and less likely to include   herpes or other viralencephalitis given the long-standing clinical   symptoms.    Incidentally identified is abnormal FLAIR hyperintense signal and   enlargement of the left ophthalmic vein. Clinical correlation is needed,   and further imaging of the orbits and cavernous sinus may be indicated.    Extensive mucosal thickening throughout the paranasal sinuses, compatible   with severe sinusitis of indeterminate age.    Mild periventricular and subcortical white matter chronic microvascular   ischemic changes.      < from: CT Abdomen and Pelvis w/ Oral Cont and w/ IV Cont (01.25.23 @ 21:58) >    INTERPRETATION:  CLINICAL INFORMATION: Temporal lobe epilepsy. Evaluate   for paraneoplastic etiology.    COMPARISON: None.    CONTRAST/COMPLICATIONS:  IV Contrast: 90 cc's of Omnipaque 350 administered. 10 cc's were   discarded.  Oral Contrast: None  Complications: None reported.    PROCEDURE:  CT of the Chest, Abdomen and Pelvis was performed.  Sagittal and coronal reformats were performed.    FINDINGS:  CHEST:  LUNGS AND LARGE AIRWAYS: Patent central airways. Linear and subsegmental   atelectasis within the bilateral lower lobes. Few subpleural nodulesin   the right upper and middle lobes measuring up to 5 mm (3, 83), probably   intrapulmonary lymph nodes.  PLEURA: No pleural effusion.  VESSELS: Atherosclerotic changes of the aorta and coronary arteries.  HEART: Heart size is normal. No pericardial effusion.  MEDIASTINUM AND GLADYS: No lymphadenopathy.  CHEST WALL AND LOWER NECK: Within normal limits.    ABDOMEN AND PELVIS:  LIVER: Within normal limits.  BILE DUCTS: Normal caliber.  GALLBLADDER: Within normal limits.  SPLEEN: Within normal limits.  PANCREAS: Within normal limits.  ADRENALS: Indeterminant 1.2 cm right adrenal nodule.  KIDNEYS/URETERS: Bilateral renal subcentimeter hypodensities too small to   characterize. No hydronephrosis.    BLADDER: Within normal limits.  REPRODUCTIVE ORGANS: Prostate is enlarged with metallic densities.    BOWEL: No bowel obstruction. Appendix is normal. Colonic diverticulosis.  PERITONEUM: No ascites.  VESSELS: Atherosclerotic changes.  RETROPERITONEUM/LYMPH NODES: No lymphadenopathy.  ABDOMINAL WALL: Within normal limits.  BONES: Degenerative changes.    IMPRESSION:  Few subpleural pulmonary nodules measuring up to 5 mm, probably   intrapulmonary lymph nodes. Follow-up CT chest can be obtained in 12   months or as clinically indicated.    Indeterminant 1.2 cm right adrenal nodule.    < end of copied text >  EEG Summary: 1/26/23    Abnormal EEG in the awake, drowsy and asleep states.    - At least 11 focal electrographic seizures, subtle left temporal electrographic changes, tachycardia, clinically characterized by oral automatisms, grimacing, cursing and hyperventilation. Awareness is preserved.   - Rare left anterior temporal sharp waves discharges, F7/T7 maximal  - Intermittent left temporal theta and polymorphic delta activity       Impression/Clinical Correlate:    This is an abnormal EEG record.     At eleven focal electrographic left temporal seizures, characterized by oral automatisms, cursing and tachycardia.    Potential epileptic activity in left anterior temporal region.    Regional cortical dysfunction in the left temporal area.

## 2023-01-27 NOTE — PROGRESS NOTE ADULT - ASSESSMENT
78yo M with Hx asthma, sinusitis, who presents w/ CC of abnormal EEG  MRI Brain MRN [id number] showed a lesion in the left amygdalar region (neoplastic vs inflammatory vs infectious)    Impression: few months of intermittent lightheaded episodes, found to have c/f L temporal seizures 2/2 focal seizures. 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 (neoplastic vs inflammatory vs infectious)    Plan:    Neuro  [] C/w vEEG   [] Loaded keppra 1500mg, followed by maintenance 1000 mg bid  [] S/p LP on 1/25/23; neg csf protein, glucose; pending inflammatory/infectious/autoimmune panel  pending ACE, HSV, autoimmune, paraneoplastic panel, cytology and cytopathology   [] rescue medications: 1) Ativan 1mg 1x for GTC > 3 min, 2) Vimpat 200mg 1x  [] neuro checks and vitals q4h  [] fall/seizure precautions    # Pulmonary nodules found on CT CAP  few, small 5mm   f/u CT in 3-6 months (4/23-7/23) with pulmonology (# [phone])  no further inpatient work up    #asthma  c/w home albuterol 90 2x puffs q6h PRN for SOB/wheezing  c/w Breo Ellipta 200-25 daily    #sinusitis  c/w home Augmentin 500mg BID for 10 days (1/21-1/30)  c/w home montelukast 10mg daily  c/w home Flonase 2 sprays BID    Case discussed and staffed with Dr. Jhaveri

## 2023-01-27 NOTE — EEG REPORT - NS EEG TEXT BOX
Day 3 – 	Start: 1/26/2023  08:00 	End: 1/27/2023   08:00  	Duration: 24 hr   Daily EEG Visual Analysis  FINDINGS:  The background was continuous, symmetric, spontaneously variable and reactive. During wakefulness, the posterior dominant rhythm consisted of symmetric, well-modulated 8-9 Hz activity, with amplitude to 30 uV, that attenuated to eye opening.  Low amplitude frontal beta was noted in wakefulness. Anterior to posterior gradient is present. No Breach rhythms.   Background Slowing: No generalized background slowing was present.  Focal Slowing:  Intermittent left temporal theta and polymorphic delta activity.  Intermittent right temporal polymorphic delta slowing.   Sleep Background: Drowsiness was characterized by fragmentation, attenuation, and slowing of the background activity.   Sleep was characterized by the presence of symmetric sleep spindles and K-complexes.  Other Non-Epileptiform Findings: None were present.  Activation Procedures:  Hyperventilation was not performed.   Photic stimulation was performed and did not elicit any abnormalities.     Interictal Epileptiform Activity:  Rare left anterior temporal sharp waves discharges, F7/T7 maximal   Events:  Eight focal electrographic seizures, while mostly subclinical at times 2.5 Hz evolving rhythmic delta is seen maximally over the left temporal (F7), and frontocentral chains that remains confined to these chains. Ictal electrographic pattern is typically preceded by the tachycardia 10-15 seconds prior. Clinically there is stereotypical semiology of arousal (if asleep), followed by +/- oral automatisms, grimacing / frowning, profanities, and hyperventilation. Awareness is preserved patient recognizes the seizure and triggers the event button. He can effectively communicate with bedside staff. He reports being amnestic to these seizures. Duration 60-90 seconds. No slowing observed at offset.      Artifacts: Intermittent myogenic and movement artifacts were noted.  ECG: The heart rate on single channel ECG was predominantly between  BPM.   AEDs:  mg BID     EEG Summary:  Abnormal EEG in the awake, drowsy and asleep states.  Eight focal electrographic seizures, at times seen with subtle left frontotemporal electrographic changes, tachycardia, clinically characterized by oral automatisms, grimacing, cursing and hyperventilation. Awareness is preserved.  Rare left anterior temporal sharp waves discharges, F7/T7 maximal Intermittent left temporal theta and polymorphic delta activity    Impression/Clinical Correlate:  This is an abnormal EEG record.   Eight focal electrographic left temporal seizures, characterized by oral automatisms, cursing and tachycardia.   Potential epileptic activity in left anterior temporal area.   Regional cortical dysfunction in bilateral temporal regions.   Day 3 – 	Start: 1/26/2023  08:00 	End: 1/27/2023   08:00  	Duration: 24 hr   Daily EEG Visual Analysis  FINDINGS:  The background was continuous, symmetric, spontaneously variable and reactive. During wakefulness, the posterior dominant rhythm consisted of symmetric, well-modulated 8-9 Hz activity, with amplitude to 30 uV, that attenuated to eye opening.  Low amplitude frontal beta was noted in wakefulness. Anterior to posterior gradient is present. No Breach rhythms.   Background Slowing: No generalized background slowing was present.  Focal Slowing:  Intermittent left temporal theta and polymorphic delta activity.  Intermittent right temporal polymorphic delta slowing.   Sleep Background: Drowsiness was characterized by fragmentation, attenuation, and slowing of the background activity.   Sleep was characterized by the presence of symmetric sleep spindles and K-complexes.  Other Non-Epileptiform Findings: None were present.  Activation Procedures:  Hyperventilation was not performed.   Photic stimulation was performed and did not elicit any abnormalities.     Interictal Epileptiform Activity:  Rare left anterior temporal sharp waves discharges, F7/T7 maximal   Events:  Eight focal electrographic seizures, while mostly subclinical at times 2.5 Hz evolving rhythmic delta is seen maximally over the left temporal (F7), and frontocentral chains that remains confined to these chains. Ictal electrographic pattern is typically preceded by the tachycardia 10-15 seconds prior. Clinically there is stereotypical semiology of arousal (if asleep), followed by +/- oral automatisms, grimacing / frowning, profanities, and hyperventilation. Awareness is preserved patient recognizes the seizure and triggers the event button. He can effectively communicate with bedside staff. He reports being amnestic to these seizures. Duration 60-90 seconds. No slowing observed at offset.      Artifacts: Intermittent myogenic and movement artifacts were noted.  ECG: The heart rate on single channel ECG was predominantly between  BPM.   AEDs: LEV 1500 mg BID   EEG Summary:  Abnormal EEG in the awake, drowsy and asleep states.  Eight focal electrographic seizures, at times seen with subtle left frontotemporal electrographic changes, tachycardia, clinically characterized by oral automatisms, grimacing, cursing and hyperventilation. Awareness is preserved.  Rare left anterior temporal sharp waves discharges, F7/T7 maximal Intermittent left temporal theta and polymorphic delta activity    Impression/Clinical Correlate:  This is an abnormal EEG record.   Eight focal electrographic left temporal seizures, characterized by oral automatisms, cursing and tachycardia.  Last seizure 1/27 7 am  Potential epileptic activity in left anterior temporal area.   Regional cortical dysfunction in bilateral temporal regions.   Day 3 – 	Start: 1/26/2023  08:00 	End: 1/27/2023   08:00  	Duration: 24 hr   Daily EEG Visual Analysis  FINDINGS:  The background was continuous, symmetric, spontaneously variable and reactive. During wakefulness, the posterior dominant rhythm consisted of symmetric, well-modulated 8-9 Hz activity, with amplitude to 30 uV, that attenuated to eye opening.  Low amplitude frontal beta was noted in wakefulness. Anterior to posterior gradient is present. No Breach rhythms.   Background Slowing: No generalized background slowing was present.  Focal Slowing:  Intermittent left temporal theta and polymorphic delta activity.  Intermittent right temporal polymorphic delta slowing.   Sleep Background: Drowsiness was characterized by fragmentation, attenuation, and slowing of the background activity.   Sleep was characterized by the presence of symmetric sleep spindles and K-complexes.  Other Non-Epileptiform Findings: None were present.  Activation Procedures:  Hyperventilation was not performed.   Photic stimulation was performed and did not elicit any abnormalities.     Interictal Epileptiform Activity:  Rare left anterior temporal sharp waves discharges, F7/T7 maximal   Events:  Eight focal electrographic seizures, while mostly subclinical at times 2.5 Hz evolving rhythmic delta is seen maximally over the left temporal (F7), and frontocentral chains that remains confined to these chains. Ictal electrographic pattern is typically preceded by the tachycardia 10-15 seconds prior. Clinically there is stereotypical semiology of arousal (if asleep), followed by +/- oral automatisms, grimacing / frowning, profanities, and hyperventilation. Awareness is preserved patient recognizes the seizure and triggers the event button. He can effectively communicate with bedside staff. He reports being amnestic to these seizures. Duration 60-90 seconds. No slowing observed at offset.      Artifacts: Intermittent myogenic and movement artifacts were noted.  ECG: The heart rate on single channel ECG was predominantly between  BPM.   AEDs: LEV 1500 mg BID   EEG Summary:  Abnormal EEG in the awake, drowsy and asleep states.  Eight focal electrographic seizures, at times seen with subtle left frontotemporal electrographic changes, tachycardia, clinically characterized by oral automatisms, grimacing, cursing and hyperventilation. Awareness is preserved.  Rare left anterior temporal sharp waves discharges, F7/T7 maximal Intermittent left temporal theta and polymorphic delta activity    Impression/Clinical Correlate:  This is an abnormal EEG record.   Eight focal electrographic left temporal seizures, characterized by oral automatisms, cursing and tachycardia.  Last seizure 1/27 7 am  Potential epileptic activity in left anterior temporal area.   Regional cortical dysfunction in the left temporal region.

## 2023-01-28 LAB
CULTURE RESULTS: NO GROWTH — SIGNIFICANT CHANGE UP
SPECIMEN SOURCE: SIGNIFICANT CHANGE UP

## 2023-01-28 PROCEDURE — 99233 SBSQ HOSP IP/OBS HIGH 50: CPT

## 2023-01-28 PROCEDURE — 95720 EEG PHY/QHP EA INCR W/VEEG: CPT

## 2023-01-28 RX ADMIN — MONTELUKAST 10 MILLIGRAM(S): 4 TABLET, CHEWABLE ORAL at 21:06

## 2023-01-28 RX ADMIN — LEVETIRACETAM 1500 MILLIGRAM(S): 250 TABLET, FILM COATED ORAL at 17:40

## 2023-01-28 RX ADMIN — LACOSAMIDE 100 MILLIGRAM(S): 50 TABLET ORAL at 17:40

## 2023-01-28 RX ADMIN — Medication 1 TABLET(S): at 17:41

## 2023-01-28 RX ADMIN — ENOXAPARIN SODIUM 40 MILLIGRAM(S): 100 INJECTION SUBCUTANEOUS at 21:06

## 2023-01-28 RX ADMIN — LACOSAMIDE 100 MILLIGRAM(S): 50 TABLET ORAL at 05:02

## 2023-01-28 RX ADMIN — Medication 3 MILLIGRAM(S): at 21:06

## 2023-01-28 RX ADMIN — Medication 1 TABLET(S): at 05:02

## 2023-01-28 RX ADMIN — LEVETIRACETAM 1500 MILLIGRAM(S): 250 TABLET, FILM COATED ORAL at 05:02

## 2023-01-28 NOTE — EEG REPORT - NS EEG TEXT BOX
Day 4 – 	Start: 1/27/2023  08:00 	End: 1/28/2023   08:00  	Duration: 24 hr   Daily EEG Visual Analysis  FINDINGS:  The background was continuous, symmetric, spontaneously variable and reactive. During wakefulness, the posterior dominant rhythm consisted of symmetric, well-modulated 8-9 Hz activity, with amplitude to 30 uV, that attenuated to eye opening.  Low amplitude frontal beta was noted in wakefulness. Anterior to posterior gradient is present. No Breach rhythms.   Background Slowing: No generalized background slowing was present.  Focal Slowing:  Intermittent left temporal theta and polymorphic delta activity.  Intermittent right temporal polymorphic delta slowing.   Sleep Background: Drowsiness was characterized by fragmentation, attenuation, and slowing of the background activity.   Sleep was characterized by the presence of symmetric sleep spindles and K-complexes.  Other Non-Epileptiform Findings: None were present.  Activation Procedures:  Hyperventilation was not performed.   Photic stimulation was performed and did not elicit any abnormalities.     Interictal Epileptiform Activity:  Rare left anterior temporal sharp waves discharges, F7/T7 maximal   Events/seizures: At least six focal electrographic seizures, while mostly subclinical, noted evolving sharply contoured delta/theta activity maximally over the left temporal (F7), and frontocentral chains that remains confined to these chains. Ictal electrographic pattern is typically preceded by the tachycardia 10-15 seconds prior. Clinically there is stereotypical semiology of grimacing / frowning, profanities, and hyperventilation. Awareness is preserved patient recognizes the seizure and triggers the event button. He can effectively communicate with bedside staff. Duration 60-90 seconds. No slowing observed at offset.      Events: Patient pushed event button in the PM around 17:25PM, however no changes on EEG were noted during these events.   Artifacts: Intermittent myogenic and movement artifacts were noted.  ECG: The heart rate on single channel ECG was predominantly between  BPM.   AEDs: LEV 1500 mg BID, LCM 100mg BID    EEG Summary:  Abnormal EEG in the awake, drowsy and asleep states.  At least 6 electrographic seizures, at times seen with subtle left frontotemporal electrographic changes, tachycardia, clinically characterized by oral automatisms, grimacing, cursing and hyperventilation. Awareness is preserved.  Rare left anterior temporal sharp waves discharges, F7/T7 maximal Intermittent left temporal theta and polymorphic delta activity    Impression/Clinical Correlate:  This is an abnormal EEG record.   At least 6 electrographic left temporal seizures, characterized by oral automatisms, cursing and tachycardia.  Last seizure at 11:35 AM on 1/28/23.  Potential epileptic activity in left anterior temporal area.   Regional cortical dysfunction in the left temporal region.      Juma Antonio MD Epilepsy Fellow , [facility] Department of Neurology, [facility]  [facility] EEG Reading Room Ph#: [phone] Epilepsy Answering Service after 5PM and before 8:30AM: Ph#: [phone]  Day 4 – 	Start: 1/27/2023  08:00 	End: 1/28/2023   08:00  	Duration: 24 hr   Daily EEG Visual Analysis  FINDINGS:  The background was continuous, symmetric, spontaneously variable and reactive. During wakefulness, the posterior dominant rhythm consisted of symmetric, well-modulated 8-9 Hz activity, with amplitude to 30 uV, that attenuated to eye opening.  Low amplitude frontal beta was noted in wakefulness. Anterior to posterior gradient is present. No Breach rhythms.   Background Slowing: No generalized background slowing was present.  Focal Slowing:  Intermittent left temporal theta and polymorphic delta activity.  Intermittent right temporal polymorphic delta slowing.   Sleep Background: Drowsiness was characterized by fragmentation, attenuation, and slowing of the background activity.   Sleep was characterized by the presence of symmetric sleep spindles and K-complexes.  Other Non-Epileptiform Findings: None were present.  Activation Procedures:  Hyperventilation was not performed.   Photic stimulation was performed and did not elicit any abnormalities.     Interictal Epileptiform Activity:  Rare left anterior temporal sharp waves discharges, F7/T7 maximal   Events/seizures: At least six focal electrographic seizures, while mostly subclinical, noted evolving sharply contoured delta/theta activity maximally over the left temporal (F7), and frontocentral chains that remains confined to these chains. Ictal electrographic pattern is typically preceded by the tachycardia 10-15 seconds prior. Clinically there is stereotypical semiology of grimacing / frowning, profanities, and hyperventilation. Awareness is preserved patient recognizes the seizure and triggers the event button. He can effectively communicate with bedside staff. Duration 60-90 seconds. No slowing observed at offset.      Events: Patient pushed event button in the PM around 17:25PM, however no changes on EEG were noted during these events.   Artifacts: Intermittent myogenic and movement artifacts were noted.  ECG: The heart rate on single channel ECG was predominantly between  BPM.   AEDs: LEV 1500 mg BID, LCM 100mg BID    EEG Summary:  Abnormal EEG in the awake, drowsy and asleep states.  At least 6 electrographic seizures, at times seen with subtle left frontotemporal electrographic changes, tachycardia, clinically characterized by oral automatisms, grimacing, cursing and hyperventilation. Awareness is preserved.  Rare left anterior temporal sharp waves discharges, F7/T7 maximal Intermittent left temporal theta and polymorphic delta activity    Impression/Clinical Correlate:  This is an abnormal EEG record.   At least 6 electrographic left temporal seizures, characterized by oral automatisms, cursing and tachycardia.  Last seizure at 11:35 AM on 1/28/23.  Potential epileptic activity in left anterior temporal area.   Regional cortical dysfunction in the left temporal region.      Juma Antonio MD Epilepsy Fellow , [facility] Department of Neurology, [facility]  Nahid Sarabia MD EEG/Epilepsy Attending   [facility] EEG Reading Room Ph#: [phone] Epilepsy Answering Service after 5PM and before 8:30AM: Ph#: [phone]  Day 4 – 	Start: 1/27/2023  08:00 	End: 1/28/2023   08:00  	Duration: 24 hr   Daily EEG Visual Analysis  FINDINGS:  The background was continuous, symmetric, spontaneously variable and reactive. During wakefulness, the posterior dominant rhythm consisted of symmetric, well-modulated 8-9 Hz activity, with amplitude to 30 uV, that attenuated to eye opening.  Low amplitude frontal beta was noted in wakefulness. Anterior to posterior gradient is present. No Breach rhythms.   Background Slowing: No generalized background slowing was present.  Focal Slowing:  Intermittent left temporal theta and polymorphic delta activity.  Intermittent right temporal polymorphic delta slowing.   Sleep Background: Drowsiness was characterized by fragmentation, attenuation, and slowing of the background activity.   Sleep was characterized by the presence of symmetric sleep spindles and K-complexes.  Other Non-Epileptiform Findings: None were present.  Activation Procedures:  Hyperventilation was not performed.   Photic stimulation was performed and did not elicit any abnormalities.     Interictal Epileptiform Activity:  Rare left anterior temporal sharp waves discharges, F7/T7 maximal   Events/seizures: At least six focal electrographic seizures, while mostly subclinical, noted evolving sharply contoured delta/theta activity maximally over the left temporal (F7), and frontocentral chains that remains confined to these chains. Ictal electrographic pattern is typically preceded by the tachycardia 10-15 seconds prior. Clinically there is stereotypical semiology of grimacing / frowning, profanities, and hyperventilation. Awareness is preserved patient recognizes the seizure and triggers the event button. He can effectively communicate with bedside staff. Duration 60-90 seconds. No slowing observed at offset.      Events: Patient pushed event button in the PM around 17:25PM, however no changes on EEG were noted during these events.   Artifacts: Intermittent myogenic and movement artifacts were noted.  ECG: The heart rate on single channel ECG was predominantly between  BPM.   AEDs: LEV 1500 mg BID, LCM 100mg BID    EEG Summary:  Abnormal EEG in the awake, drowsy and asleep states.  At least 6 electrographic seizures, at times seen with subtle left frontotemporal electrographic changes, tachycardia, clinically characterized by oral automatisms, grimacing, cursing and hyperventilation. Awareness is preserved.  Rare left anterior temporal sharp waves discharges, F7/T7 maximal Intermittent left temporal theta and polymorphic delta activity    Impression/Clinical Correlate:  This is an abnormal EEG record.   At least 6 electrographic left temporal seizures, characterized by oral automatisms, cursing and tachycardia.  Last seizure at 11:35 AM on 1/27/23.  Potential epileptic activity in left anterior temporal area.   Regional cortical dysfunction in the left temporal region.      Juma Antonio MD Epilepsy Fellow , [facility] Department of Neurology, [facility]  Naihd Sarabia MD EEG/Epilepsy Attending   [facility] EEG Reading Room Ph#: [phone] Epilepsy Answering Service after 5PM and before 8:30AM: Ph#: [phone]

## 2023-01-28 NOTE — PROGRESS NOTE ADULT - ASSESSMENT
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 (neoplastic vs inflammatory vs infectious)    Plan:    Neuro  [] C/w vEEG   [] keppra 1500mg bid, Vimpat 100mg bid - if further seizures may increase to 150mg bid  [] S/p LP on 1/25/23; neg csf protein, glucose; pending inflammatory/infectious/autoimmune panel  pending ACE, HSV, autoimmune, paraneoplastic panel, cytology and cytopathology   [] rescue medications: 1) Ativan 1mg 1x for GTC > 3 min, 2) Vimpat 200mg 1x  [] neuro checks and vitals q4h  [] fall/seizure precautions    # Pulmonary nodules found on CT CAP  few, small 5mm   f/u CT in 3-6 months (4/23-7/23) with pulmonology (# [phone])  no further inpatient work up    #asthma  c/w home albuterol 90 2x puffs q6h PRN for SOB/wheezing  c/w Breo Ellipta 200-25 daily    #sinusitis  c/w home Augmentin 500mg BID for 10 days (1/21-1/30)  c/w home montelukast 10mg daily  c/w home Flonase 2 sprays BID

## 2023-01-28 NOTE — PROGRESS NOTE ADULT - SUBJECTIVE AND OBJECTIVE BOX
*************************************  NEUROLOGY PROGRESS NOTE  **************************************    VANESSA CASTRO  Male  MRN-[id number]    Subjective: no overnight events     Vital Signs Last 24 Hrs  T(C): 36.2 (28 Jan 2023 10:22), Max: 37.1 (27 Jan 2023 21:16)  T(F): 97.1 (28 Jan 2023 10:22), Max: 98.8 (27 Jan 2023 21:16)  HR: 72 (28 Jan 2023 10:22) (60 - 75)  BP: 106/62 (28 Jan 2023 10:22) (106/62 - 124/69)  BP(mean): --  RR: 18 (28 Jan 2023 10:22) (18 - 18)  SpO2: 95% (28 Jan 2023 10:22) (93% - 97%)    Parameters below as of 28 Jan 2023 08:49  Patient On (Oxygen Delivery Method): room air          PHYSICAL EXAMINATION:  General: Well-developed, well nourished, in no acute distress.  Eyes: Conjunctiva and sclera clear.  Neck: supple, nontender, good ROM  Lungs: no respiratory distress  Neurologic:  - Mental Status:  Alert, awake, oriented to person, place, and time; Speech is fluent with intact naming, repetition, and comprehension  - Cranial Nerves II-XII:  VFF, No nystagmus or APD noted, EOMI, PERRLA, V1-V3 intact, no facial asymmetry, t/p midline, SCM/trap intact.  - Motor:  Strength is 5/5 throughout.  There is no pronator drift.  Normal muscle bulk and tone throughout.  - Reflexes:  2+ and symmetric at the biceps, triceps, brachioradialis, knees, and ankles.  Plantar responses flexor.  - Sensory:  Intact to light touch, pin prick, vibration, and joint-position sense throughout.  - Coordination:  Finger-nose-finger and heel-knee-shin intact without dysmetria.  Rapid alternating hand movements intact.  - Gait:   Normal steps, base, arm swing, and turning.  Heel and toe walking are normal.  Tandem gait is normal.  Romberg testing is negative.    LABS:                          12.1   7.19  )-----------( 274      ( 27 Jan 2023 06:27 )             39.2     01-27    137  |  100  |  17  ----------------------------<  95  4.7   |  27  |  1.15    Ca    9.2      27 Jan 2023 06:27            RADIOLOGY & ADDITIONAL STUDIES:        INTERPRETATION:  Exam Date: 1/24/2023 10:54 AM    MR brain with and without gadolinium    CLINICAL INFORMATION:  SEIZURE    TECHNIQUE: Multiplanar imaging of the brain with seizure protocol was   obtained with and without IV contrast. 7 cc of Gadavist was administered.   0.5 cc was discarded.    FINDINGS:   No prior similar studies are available for review.    There is abnormal T2/FLAIR hyperintense signal within the medial left   temporal lobe in the region of the amygdala and hippocampus. There is   mild mass effect on the left temporal horn. There is no associated   enhancement or susceptibility signal.    Incidentally identified is abnormal FLAIR hyperintense signal and   enlargement of the left ophthalmic vein. Clinical correlation is needed,   and further imaging of the orbits and cavernous sinus may be indicated.    There is no evidence of acute infarct, or hemorrhage. There are scattered   foci of FLAIR hyperintensity in the periventricular and subcortical white   matter of the bilateral cerebri, compatible with mild chronic   microvascular ischemic changes. The ventricles, sulci and basal cisterns   are prominent, compatible with age related generalized cerebral volume   loss.    The vertebral and internal carotid arteries demonstrate expected flow   voids indicating their patency.    Extensive mucosal thickening throughout the paranasal sinuses, compatible   with severe sinusitis. There are polyps within the bilateral posterior   nasal cavities.    IMPRESSION:    Abnormal T2/FLAIR hyperintense signal within the medial left temporal   lobe in the region of the amygdala and hippocampus. There ismild mass   effect on the left temporal horn. There is no associated enhancement or   susceptibility signal.  Differential diagnosis includes autoimmune   encephalitis or paraneoplastic encephalitis, and less likely to include   herpes or other viralencephalitis given the long-standing clinical   symptoms.    Incidentally identified is abnormal FLAIR hyperintense signal and   enlargement of the left ophthalmic vein. Clinical correlation is needed,   and further imaging of the orbits and cavernous sinus may be indicated.    Extensive mucosal thickening throughout the paranasal sinuses, compatible   with severe sinusitis of indeterminate age.    Mild periventricular and subcortical white matter chronic microvascular   ischemic changes.      < from: CT Abdomen and Pelvis w/ Oral Cont and w/ IV Cont (01.25.23 @ 21:58) >    INTERPRETATION:  CLINICAL INFORMATION: Temporal lobe epilepsy. Evaluate   for paraneoplastic etiology.    COMPARISON: None.    CONTRAST/COMPLICATIONS:  IV Contrast: 90 cc's of Omnipaque 350 administered. 10 cc's were   discarded.  Oral Contrast: None  Complications: None reported.    PROCEDURE:  CT of the Chest, Abdomen and Pelvis was performed.  Sagittal and coronal reformats were performed.    FINDINGS:  CHEST:  LUNGS AND LARGE AIRWAYS: Patent central airways. Linear and subsegmental   atelectasis within the bilateral lower lobes. Few subpleural nodulesin   the right upper and middle lobes measuring up to 5 mm (3, 83), probably   intrapulmonary lymph nodes.  PLEURA: No pleural effusion.  VESSELS: Atherosclerotic changes of the aorta and coronary arteries.  HEART: Heart size is normal. No pericardial effusion.  MEDIASTINUM AND GLADYS: No lymphadenopathy.  CHEST WALL AND LOWER NECK: Within normal limits.    ABDOMEN AND PELVIS:  LIVER: Within normal limits.  BILE DUCTS: Normal caliber.  GALLBLADDER: Within normal limits.  SPLEEN: Within normal limits.  PANCREAS: Within normal limits.  ADRENALS: Indeterminant 1.2 cm right adrenal nodule.  KIDNEYS/URETERS: Bilateral renal subcentimeter hypodensities too small to   characterize. No hydronephrosis.    BLADDER: Within normal limits.  REPRODUCTIVE ORGANS: Prostate is enlarged with metallic densities.    BOWEL: No bowel obstruction. Appendix is normal. Colonic diverticulosis.  PERITONEUM: No ascites.  VESSELS: Atherosclerotic changes.  RETROPERITONEUM/LYMPH NODES: No lymphadenopathy.  ABDOMINAL WALL: Within normal limits.  BONES: Degenerative changes.    IMPRESSION:  Few subpleural pulmonary nodules measuring up to 5 mm, probably   intrapulmonary lymph nodes. Follow-up CT chest can be obtained in 12   months or as clinically indicated.    Indeterminant 1.2 cm right adrenal nodule.    < end of copied text >  EEG Summary: 1/27/23    Abnormal EEG in the awake, drowsy and asleep states.    - Focal electrographic seizures, subtle left temporal electrographic changes, tachycardia, clinically characterized by oral automatisms, grimacing, cursing and hyperventilation. Awareness is preserved.   - Rare left anterior temporal sharp waves discharges, F7/T7 maximal  - Intermittent left temporal theta and polymorphic delta activity       Impression/Clinical Correlate:    This is an abnormal EEG record.   Focal electrographic left temporal seizures, characterized by oral automatisms, cursing and tachycardia.    Potential epileptic activity in left anterior temporal region.    Regional cortical dysfunction in the left temporal area.

## 2023-01-29 PROCEDURE — 95720 EEG PHY/QHP EA INCR W/VEEG: CPT

## 2023-01-29 PROCEDURE — 99233 SBSQ HOSP IP/OBS HIGH 50: CPT

## 2023-01-29 RX ORDER — LACOSAMIDE 50 MG/1
150 TABLET ORAL
Refills: 0 | Status: DISCONTINUED | OUTPATIENT
Start: 2023-01-29 | End: 2023-01-30

## 2023-01-29 RX ORDER — LACOSAMIDE 50 MG/1
50 TABLET ORAL ONCE
Refills: 0 | Status: DISCONTINUED | OUTPATIENT
Start: 2023-01-29 | End: 2023-01-29

## 2023-01-29 RX ADMIN — LEVETIRACETAM 1500 MILLIGRAM(S): 250 TABLET, FILM COATED ORAL at 17:43

## 2023-01-29 RX ADMIN — LACOSAMIDE 100 MILLIGRAM(S): 50 TABLET ORAL at 05:01

## 2023-01-29 RX ADMIN — Medication 2 SPRAY(S): at 17:43

## 2023-01-29 RX ADMIN — LEVETIRACETAM 1500 MILLIGRAM(S): 250 TABLET, FILM COATED ORAL at 05:01

## 2023-01-29 RX ADMIN — ENOXAPARIN SODIUM 40 MILLIGRAM(S): 100 INJECTION SUBCUTANEOUS at 21:15

## 2023-01-29 RX ADMIN — Medication 1 TABLET(S): at 17:42

## 2023-01-29 RX ADMIN — MONTELUKAST 10 MILLIGRAM(S): 4 TABLET, CHEWABLE ORAL at 21:15

## 2023-01-29 RX ADMIN — LACOSAMIDE 150 MILLIGRAM(S): 50 TABLET ORAL at 17:42

## 2023-01-29 RX ADMIN — LACOSAMIDE 50 MILLIGRAM(S): 50 TABLET ORAL at 09:29

## 2023-01-29 RX ADMIN — Medication 3 MILLIGRAM(S): at 21:15

## 2023-01-29 RX ADMIN — Medication 1 TABLET(S): at 05:00

## 2023-01-29 NOTE — EEG REPORT - NS EEG TEXT BOX
Day 5 – 	Start: 1/28/2023  08:00 	End: 1/29/2023   08:00  	Duration: 24 hr   Daily EEG Visual Analysis  FINDINGS:  The background was continuous, symmetric, spontaneously variable and reactive. During wakefulness, the posterior dominant rhythm consisted of symmetric, well-modulated 8-9 Hz activity, with amplitude to 30 uV, that attenuated to eye opening.  Low amplitude frontal beta was noted in wakefulness. Anterior to posterior gradient is present. No Breach rhythms.   Background Slowing: No generalized background slowing was present.  Focal Slowing:  Intermittent left temporal theta and polymorphic delta activity.  Intermittent right temporal polymorphic delta slowing.   Sleep Background: Drowsiness was characterized by fragmentation, attenuation, and slowing of the background activity.   Sleep was characterized by the presence of symmetric sleep spindles and K-complexes.  Other Non-Epileptiform Findings: None were present.  Activation Procedures:  Hyperventilation was not performed.   Photic stimulation was performed and did not elicit any abnormalities.     Interictal Epileptiform Activity:  Rare left anterior temporal sharp waves discharges, F7/T7 maximal   Seizures: At least five focal electrographic seizures, while mostly subclinical, noted evolving sharply contoured delta/theta activity maximally over the left temporal (F7), and frontocentral chains that remains confined to these chains. Ictal electrographic pattern is typically preceded by the tachycardia 10-15 seconds prior. Clinically at times there is stereotypical semiology of grimacing / frowning, profanities, and hyperventilation. Awareness is preserved patient recognizes the seizure and triggers the event button. He can effectively communicate with bedside staff. Duration 30-90 seconds.            Events: Patient pushed event button multiple times, however no changes on EEG were noted during these events. At times noted tachycardia and hyperventilation concerning for surface negative seizures.   Artifacts: Intermittent myogenic and movement artifacts were noted.  ECG: The heart rate on single channel ECG was predominantly between  BPM.   AEDs: LEV 1500 mg BID, LCM 100mg BID     EEG Summary:  Abnormal EEG in the awake, drowsy and asleep states.  At least 5 electrographic seizures, at times seen with subtle left frontotemporal electrographic changes, tachycardia, clinically characterized by oral automatisms, grimacing, cursing and hyperventilation. Awareness is preserved.  Rare left anterior temporal sharp waves discharges, F7/T7 maximal Intermittent left temporal theta and polymorphic delta activity    Impression/Clinical Correlate:  This is an abnormal EEG record.   At least 5 electrographic left temporal seizures, characterized by oral automatisms, cursing and tachycardia.  Last seizure at 17:23 PM on 1/28/23.  Potential epileptic activity in left anterior temporal area.   Regional cortical dysfunction in the left temporal region.  Multiple push button events with no epileptiform EEG abnormalities noted, at times patient is hyperventilating and tachycardic, concerning for surface negative seizures.     Juma Antonio MD Epilepsy Fellow , [facility] Department of Neurology, [facility]  Nahid Sarabia MD EEG/Epilepsy Attending   [facility] EEG Reading Room Ph#: [phone] Epilepsy Answering Service after 5PM and before 8:30AM: Ph#: [phone]  Day 5 – 	Start: 1/28/2023  08:00 	End: 1/29/2023   08:00  	Duration: 24 hr   Daily EEG Visual Analysis  FINDINGS:  The background was continuous, symmetric, spontaneously variable and reactive. During wakefulness, the posterior dominant rhythm consisted of symmetric, well-modulated 8-9 Hz activity, with amplitude to 30 uV, that attenuated to eye opening.  Low amplitude frontal beta was noted in wakefulness. Anterior to posterior gradient is present. No Breach rhythms.   Background Slowing: No generalized background slowing was present.  Focal Slowing:  Intermittent left temporal theta and polymorphic delta activity.  Intermittent right temporal polymorphic delta slowing.   Sleep Background: Drowsiness was characterized by fragmentation, attenuation, and slowing of the background activity.   Sleep was characterized by the presence of symmetric sleep spindles and K-complexes.  Other Non-Epileptiform Findings: None were present.  Activation Procedures:  Hyperventilation was not performed.   Photic stimulation was performed and did not elicit any abnormalities.     Interictal Epileptiform Activity:  Rare left anterior temporal sharp waves discharges, F7/T7 maximal   Seizures: At least five focal electrographic seizures, while mostly subclinical, noted evolving sharply contoured delta/theta activity maximally over the left temporal (F7), and frontocentral chains that remains confined to these chains. Ictal electrographic pattern is typically preceded by the tachycardia 10-15 seconds prior. Clinically at times there is stereotypical semiology of grimacing / frowning, profanities, and hyperventilation. Awareness is preserved patient recognizes the seizure and triggers the event button. He can effectively communicate with bedside staff. Duration 30-90 seconds.            Events: Patient pushed event button multiple times, however no changes on EEG were noted during these events. At times noted tachycardia and hyperventilation concerning for surface negative seizures.   Artifacts: Intermittent myogenic and movement artifacts were noted.  ECG: The heart rate on single channel ECG was predominantly between  BPM.   AEDs: LEV 1500 mg BID, LCM 100mg BID     EEG Summary:  Abnormal EEG in the awake, drowsy and asleep states.  At least 5 electrographic seizures, at times seen with subtle left frontotemporal electrographic changes, tachycardia, clinically characterized by oral automatisms, grimacing, cursing and hyperventilation. Awareness is preserved.  Rare left anterior temporal sharp waves discharges, F7/T7 maximal Intermittent left temporal theta and polymorphic delta activity    Impression/Clinical Correlate:  This is an abnormal EEG record.   At least 5 electrographic left temporal seizures, characterized by oral automatisms, cursing and tachycardia.  Last seizure at 17:23 PM on 1/28/23.  Potential epileptic activity in left anterior temporal area.   Regional cortical dysfunction in the left temporal region.  Multiple push button events with no epileptiform EEG abnormalities noted, at times patient is hyperventilating and tachycardic, concerning for possible surface negative seizures.     Juma Antonio MD Epilepsy Fellow , [facility] Department of Neurology, [facility]  Nahid Sarabia MD EEG/Epilepsy Attending   [facility] EEG Reading Room Ph#: [phone] Epilepsy Answering Service after 5PM and before 8:30AM: Ph#: [phone]  Day 5 – 	Start: 1/28/2023  08:00 	End: 1/29/2023   08:00  	Duration: 24 hr   Daily EEG Visual Analysis  FINDINGS:  The background was continuous, symmetric, spontaneously variable and reactive. During wakefulness, the posterior dominant rhythm consisted of symmetric, well-modulated 8-9 Hz activity, with amplitude to 30 uV, that attenuated to eye opening.  Low amplitude frontal beta was noted in wakefulness. Anterior to posterior gradient is present. No Breach rhythms.   Background Slowing: No generalized background slowing was present.  Focal Slowing:  Intermittent left temporal theta and polymorphic delta activity.  Intermittent right temporal polymorphic delta slowing.   Sleep Background: Drowsiness was characterized by fragmentation, attenuation, and slowing of the background activity.   Sleep was characterized by the presence of symmetric sleep spindles and K-complexes.  Other Non-Epileptiform Findings: None were present.  Activation Procedures:  Hyperventilation was not performed.   Photic stimulation was performed and did not elicit any abnormalities.     Interictal Epileptiform Activity:  Rare left anterior temporal sharp waves discharges, F7/T7 maximal   Seizures: At least five focal electrographic seizures, while mostly subclinical, noted evolving sharply contoured delta/theta activity maximally over the left temporal (F7), and frontocentral chains that remains confined to these chains. Ictal electrographic pattern is typically preceded by the tachycardia 10-15 seconds prior. Clinically at times there is stereotypical semiology of grimacing / frowning, profanities, and hyperventilation. Awareness is preserved patient recognizes the seizure and triggers the event button. He can effectively communicate with bedside staff. Duration 30-90 seconds.            Events: Patient pushed event button multiple times, however no changes on EEG were noted during these events. At times noted tachycardia and hyperventilation concerning for possible surface negative seizures.   Artifacts: Intermittent myogenic and movement artifacts were noted.  ECG: The heart rate on single channel ECG was predominantly between  BPM.   AEDs: LEV 1500 mg BID, LCM 100mg BID     EEG Summary:  Abnormal EEG in the awake, drowsy and asleep states.  At least 5 electrographic seizures, at times seen with subtle left frontotemporal electrographic changes, tachycardia, clinically characterized by oral automatisms, grimacing, cursing and hyperventilation. Awareness is preserved.  Rare left anterior temporal sharp waves discharges, F7/T7 maximal Intermittent left temporal theta and polymorphic delta activity    Impression/Clinical Correlate:  This is an abnormal EEG record.   At least 5 electrographic left temporal seizures, characterized by oral automatisms, cursing and tachycardia.  Last seizure at 17:23 PM on 1/28/23.  Potential epileptic activity in left anterior temporal area.   Regional cortical dysfunction in the left temporal region.  Multiple push button events with no epileptiform EEG abnormalities noted, at times patient is hyperventilating and tachycardic, concerning for possible surface negative seizures.     Juma Antonio MD Epilepsy Fellow , [facility] Department of Neurology, [facility]  Nahid Sarabia MD EEG/Epilepsy Attending   [facility] EEG Reading Room Ph#: [phone] Epilepsy Answering Service after 5PM and before 8:30AM: Ph#: [phone]

## 2023-01-29 NOTE — PROGRESS NOTE ADULT - SUBJECTIVE AND OBJECTIVE BOX
SUBJECTIVE/INTERVAL HISTORY:  - No acute events ovn, reported no events of intermittent light headedness    PAST MEDICAL & SURGICAL HISTORY:  Asthma      Sinusitis      Status post left rotator cuff repair        FAMILY HISTORY:  No pertinent family history in first degree relatives      MEDICATIONS (HOME):  Home Medications:  albuterol 90 mcg/inh inhalation aerosol: 2 puff(s) inhaled every 6 hours, As Needed (24 Jan 2023 16:42)  Augmentin 500 mg-125 mg oral tablet: 1 tab(s) orally 2 times a day (24 Jan 2023 16:42)  Breo Ellipta 200 mcg-25 mcg/inh inhalation powder: 1 puff(s) inhaled once a day (24 Jan 2023 16:42)  Flonase 50 mcg/inh nasal spray: 2 spray(s) nasal 2 times a day (24 Jan 2023 16:42)  montelukast 10 mg oral tablet: 1 tab(s) orally once a day (24 Jan 2023 16:42)    MEDICATIONS  (STANDING):  amoxicillin  500 milliGRAM(s)/clavulanate 1 Tablet(s) Oral every 12 hours  enoxaparin Injectable 40 milliGRAM(s) SubCutaneous every 24 hours  fluticasone propionate 50 MICROgram(s)/spray Nasal Spray 2 Spray(s) Both Nostrils two times a day  lacosamide 100 milliGRAM(s) Oral two times a day  levETIRAcetam 1500 milliGRAM(s) Oral two times a day  melatonin 3 milliGRAM(s) Oral at bedtime  montelukast 10 milliGRAM(s) Oral daily    MEDICATIONS  (PRN):  albuterol    90 MICROgram(s) HFA Inhaler 2 Puff(s) Inhalation every 6 hours PRN Shortness of Breath and/or Wheezing  lacosamide Injectable 200 milliGRAM(s) IV Push once PRN GTC > 3 min  LORazepam   Injectable 2 milliGRAM(s) IV Push once PRN Seizure Activity    ALLERGIES/INTOLERANCES:  Allergies  No Known Allergies    Intolerances    VITALS & EXAMINATION:  Vital Signs Last 24 Hrs  T(C): 36.6 (29 Jan 2023 04:47), Max: 36.8 (28 Jan 2023 13:25)  T(F): 97.9 (29 Jan 2023 04:47), Max: 98.3 (28 Jan 2023 13:25)  HR: 61 (29 Jan 2023 04:47) (61 - 72)  BP: 109/61 (29 Jan 2023 04:47) (106/62 - 122/72)  BP(mean): --  RR: 18 (29 Jan 2023 04:47) (18 - 18)  SpO2: 94% (29 Jan 2023 04:47) (93% - 96%)    Parameters below as of 29 Jan 2023 04:47  Patient On (Oxygen Delivery Method): room air        General:  Constitutional: normal Male, appears stated age, in no apparent distress including pain  Head: Normocephalic & atraumatic.    Neurological (>12):  MS: Eyes open while eating. Awake, alert, oriented to person, place, situation, time. Normal affect. Follows all commands.    Language: Speech is clear, fluent with good repetition & comprehension    CNs: EOMI no nystagmus, no diplopia. V1-3 intact to LT, well developed masseter muscles b/l. No facial asymmetry b/l, full eye closure strength b/l.     Motor: Normal muscle bulk & tone. No noticeable tremor or seizure. No pronator drift. All extremities are antigravity    Sensation: Intact to LT b/l throughout.     Cortical: Extinction on DSS (neglect): none    Coordination: intact rapid-alt movements. No dysmetria to FTN    Gait: No postural instability. Normal stance and tandem gait.     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    At least 6 electrographic seizures, at times seen with subtle left frontotemporal electrographic changes, tachycardia, clinically characterized by oral automatisms, grimacing, cursing and hyperventilation. Awareness is preserved.   Rare left anterior temporal sharp waves discharges, F7/T7 maximal  Intermittent left temporal theta and polymorphic delta activity       Impression/Clinical Correlate:    This is an abnormal EEG record.     At least 6 electrographic left temporal seizures, characterized by oral automatisms, cursing and tachycardia.  Last seizure at 11:35 AM on 1/28/23.   Potential epileptic activity in left anterior temporal area.    Regional cortical dysfunction in the left temporal region.  SUBJECTIVE/INTERVAL HISTORY:  - No acute events ovn, reported events of intermittent light headedness - last 523PM consistent with left temporal seizure.    PAST MEDICAL & SURGICAL HISTORY:  Asthma      Sinusitis      Status post left rotator cuff repair        FAMILY HISTORY:  No pertinent family history in first degree relatives      MEDICATIONS (HOME):  Home Medications:  albuterol 90 mcg/inh inhalation aerosol: 2 puff(s) inhaled every 6 hours, As Needed (24 Jan 2023 16:42)  Augmentin 500 mg-125 mg oral tablet: 1 tab(s) orally 2 times a day (24 Jan 2023 16:42)  Breo Ellipta 200 mcg-25 mcg/inh inhalation powder: 1 puff(s) inhaled once a day (24 Jan 2023 16:42)  Flonase 50 mcg/inh nasal spray: 2 spray(s) nasal 2 times a day (24 Jan 2023 16:42)  montelukast 10 mg oral tablet: 1 tab(s) orally once a day (24 Jan 2023 16:42)    MEDICATIONS  (STANDING):  amoxicillin  500 milliGRAM(s)/clavulanate 1 Tablet(s) Oral every 12 hours  enoxaparin Injectable 40 milliGRAM(s) SubCutaneous every 24 hours  fluticasone propionate 50 MICROgram(s)/spray Nasal Spray 2 Spray(s) Both Nostrils two times a day  lacosamide 100 milliGRAM(s) Oral two times a day  levETIRAcetam 1500 milliGRAM(s) Oral two times a day  melatonin 3 milliGRAM(s) Oral at bedtime  montelukast 10 milliGRAM(s) Oral daily    MEDICATIONS  (PRN):  albuterol    90 MICROgram(s) HFA Inhaler 2 Puff(s) Inhalation every 6 hours PRN Shortness of Breath and/or Wheezing  lacosamide Injectable 200 milliGRAM(s) IV Push once PRN GTC > 3 min  LORazepam   Injectable 2 milliGRAM(s) IV Push once PRN Seizure Activity    ALLERGIES/INTOLERANCES:  Allergies  No Known Allergies    Intolerances    VITALS & EXAMINATION:  Vital Signs Last 24 Hrs  T(C): 36.6 (29 Jan 2023 04:47), Max: 36.8 (28 Jan 2023 13:25)  T(F): 97.9 (29 Jan 2023 04:47), Max: 98.3 (28 Jan 2023 13:25)  HR: 61 (29 Jan 2023 04:47) (61 - 72)  BP: 109/61 (29 Jan 2023 04:47) (106/62 - 122/72)  BP(mean): --  RR: 18 (29 Jan 2023 04:47) (18 - 18)  SpO2: 94% (29 Jan 2023 04:47) (93% - 96%)    Parameters below as of 29 Jan 2023 04:47  Patient On (Oxygen Delivery Method): room air        General:  Constitutional: normal Male, appears stated age, in no apparent distress including pain  Head: Normocephalic & atraumatic.    Neurological (>12):  MS: Eyes open while eating. Awake, alert, oriented to person, place, situation, time. Normal affect. Follows all commands.    Language: Speech is clear, fluent with good repetition & comprehension    CNs: EOMI no nystagmus, no diplopia. V1-3 intact to LT, well developed masseter muscles b/l. No facial asymmetry b/l, full eye closure strength b/l.     Motor: Normal muscle bulk & tone. No noticeable tremor or seizure. No pronator drift. All extremities are antigravity    Sensation: Intact to LT b/l throughout.     Cortical: Extinction on DSS (neglect): none    Coordination: intact rapid-alt movements. No dysmetria to FTN    Gait: No postural instability. Normal stance and tandem gait.     LABORATORY:  CBC   Chem       LFTs   Coagulopathy   Lipid Panel   A1c   Cardiac enzymes     U/A   CSF  Immunological  Other    STUDIES & IMAGING:  Studies (EKG, EEG, EMG, etc)/Radiology (XR, CT, MR, U/S, TTE/STEVEN):    MRI Brain MRN [id number] showed a lesion in the left amygdalar region (neoplastic vs inflammatory vs infectious)    EEG 1/28/23:  EEG Summary:    Abnormal EEG in the awake, drowsy and asleep states.    At least 6 electrographic seizures, at times seen with subtle left frontotemporal electrographic changes, tachycardia, clinically characterized by oral automatisms, grimacing, cursing and hyperventilation. Awareness is preserved.   Rare left anterior temporal sharp waves discharges, F7/T7 maximal  Intermittent left temporal theta and polymorphic delta activity       Impression/Clinical Correlate:    This is an abnormal EEG record.     At least 6 electrographic left temporal seizures, characterized by oral automatisms, cursing and tachycardia.  Last seizure at 11:35 AM on 1/28/23.   Potential epileptic activity in left anterior temporal area.    Regional cortical dysfunction in the left temporal region.

## 2023-01-29 NOTE — PROGRESS NOTE ADULT - ASSESSMENT
76yo M with Hx asthma and sinusitis presented to EMU due to abnormal outpatient EEG. 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 EEG on 1/28 revealed at least 6 electrographic L temporal lobe seizures, presented as oral automatism, cursing, and tachycardia. vEEG report 1/29 pending but leads came off this morning.     Impression: few months of intermittent lightheaded episodes, found to have c/f L temporal seizures 2/2 focal seizures. 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 (neoplastic vs inflammatory vs infectious)    Plan:  Neuro  [] C/w vEEG   [] keppra 1500mg bid, Vimpat 100mg bid - if further seizures may increase to 150mg bid  [] S/p LP on 1/25/23; neg csf protein, glucose; pending inflammatory/infectious/autoimmune panel  pending ACE, HSV, autoimmune, paraneoplastic panel, cytology and cytopathology   [] rescue medications: 1) Ativan 1mg 1x for GTC > 3 min, 2) Vimpat 200mg 1x  [] neuro checks and vitals q4h  [] fall/seizure precautions    # Pulmonary nodules found on CT CAP  few, small 5mm   f/u CT in 3-6 months (4/23-7/23) with pulmonology (# [phone])  no further inpatient work up    #asthma  c/w home albuterol 90 2x puffs q6h PRN for SOB/wheezing  c/w Breo Ellipta 200-25 daily    #sinusitis  c/w home Augmentin 500mg BID for 10 days (1/21-1/30)  c/w home montelukast 10mg daily  c/w home Flonase 2 sprays BID    Case to be seen and discussed with EMU attending Dr. Sarabia 76yo M with Hx asthma and sinusitis presented to EMU due to abnormal outpatient EEG. 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 EEG on 1/28 revealed at least 6 electrographic L temporal lobe seizures, presented as oral automatism, cursing, and tachycardia. vEEG report 1/29 pending but leads came off this overnight.     Impression: few months of intermittent lightheaded episodes, found to have c/f L temporal seizures 2/2 focal seizures. 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 Will give stat additional 50 mg x1 NOW  [] S/p LP on 1/25/23; neg csf protein, glucose; pending inflammatory/infectious/autoimmune panel  pending ACE, HSV, autoimmune, paraneoplastic panel, cytology and cytopathology   [] OK to shower. Can go back on eeg after the shower  [] rescue medications: 1) Ativan 1mg 1x for GTC > 3 min, 2) Vimpat 200mg 1x  [] neuro checks and vitals q4h  [] fall/seizure precautions    # Pulmonary nodules found on CT CAP  few, small 5mm   f/u CT in 3-6 months (4/23-7/23) with pulmonology (# [phone])  no further inpatient work up    #asthma  c/w home albuterol 90 2x puffs q6h PRN for SOB/wheezing  c/w Breo Ellipta 200-25 daily    #sinusitis  c/w home Augmentin 500mg BID for 10 days (1/21-1/30)  c/w home montelukast 10mg daily  c/w home Flonase 2 sprays BID    Case seen and discussed with EMU attending Dr. Sarabia 76yo M with Hx asthma and sinusitis presented to EMU due to abnormal outpatient EEG. 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 EEG on 1/28 revealed at least 6 electrographic L temporal lobe seizures, presented as oral automatism, cursing, and tachycardia. vEEG report 1/29 pending but leads came off this overnight.     Impression: few months of intermittent lightheaded episodes, found to have c/f L temporal seizures 2/2 focal seizures. 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 Will give stat additional 50 mg x1 NOW  [] S/p LP on 1/25/23; neg csf protein, glucose; pending inflammatory/infectious/autoimmune panel  pending ACE, HSV, autoimmune, paraneoplastic panel, cytology and cytopathology   [] OK to shower. Can go back on eeg after the shower  [] rescue medications: 1) Ativan 1mg 1x for GTC > 3 min, 2) Vimpat 200mg 1x  [] neuro checks and vitals q4h  [] fall/seizure precautions    # Pulmonary nodules found on CT CAP  few, small 5mm   f/u CT in 3-6 months (4/23-7/23) with pulmonology (# [phone])  no further inpatient work up    #asthma  c/w home albuterol 90 2x puffs q6h PRN for SOB/wheezing  c/w Breo Ellipta 200-25 daily    #sinusitis  c/w home Augmentin 500mg BID for 10 days (1/21-1/30)  c/w home montelukast 10mg daily  c/w home Flonase 2 sprays BID    Case seen and discussed with EMU attending Dr. Sarabia

## 2023-01-30 ENCOUNTER — TRANSCRIPTION ENCOUNTER (OUTPATIENT)
Age: 78
End: 2023-01-30

## 2023-01-30 LAB
INNER EAR 68KD AB FLD QL: <1.5 U/L — SIGNIFICANT CHANGE UP (ref 0–3.1)
TM INTERPRETATION: SIGNIFICANT CHANGE UP

## 2023-01-30 PROCEDURE — 99232 SBSQ HOSP IP/OBS MODERATE 35: CPT | Mod: FS

## 2023-01-30 PROCEDURE — 95720 EEG PHY/QHP EA INCR W/VEEG: CPT

## 2023-01-30 PROCEDURE — 76390 MR SPECTROSCOPY: CPT | Mod: 26

## 2023-01-30 RX ORDER — LEVETIRACETAM 250 MG/1
750 TABLET, FILM COATED ORAL
Refills: 0 | Status: DISCONTINUED | OUTPATIENT
Start: 2023-01-30 | End: 2023-01-31

## 2023-01-30 RX ORDER — LACOSAMIDE 50 MG/1
200 TABLET ORAL
Refills: 0 | Status: DISCONTINUED | OUTPATIENT
Start: 2023-01-30 | End: 2023-01-31

## 2023-01-30 RX ORDER — LACOSAMIDE 50 MG/1
50 TABLET ORAL ONCE
Refills: 0 | Status: DISCONTINUED | OUTPATIENT
Start: 2023-01-30 | End: 2023-01-30

## 2023-01-30 RX ADMIN — Medication 1 TABLET(S): at 05:40

## 2023-01-30 RX ADMIN — LACOSAMIDE 200 MILLIGRAM(S): 50 TABLET ORAL at 17:23

## 2023-01-30 RX ADMIN — Medication 2 SPRAY(S): at 17:20

## 2023-01-30 RX ADMIN — LACOSAMIDE 150 MILLIGRAM(S): 50 TABLET ORAL at 05:40

## 2023-01-30 RX ADMIN — ENOXAPARIN SODIUM 40 MILLIGRAM(S): 100 INJECTION SUBCUTANEOUS at 22:00

## 2023-01-30 RX ADMIN — LACOSAMIDE 50 MILLIGRAM(S): 50 TABLET ORAL at 10:56

## 2023-01-30 RX ADMIN — Medication 3 MILLIGRAM(S): at 22:00

## 2023-01-30 RX ADMIN — Medication 1 TABLET(S): at 17:20

## 2023-01-30 RX ADMIN — MONTELUKAST 10 MILLIGRAM(S): 4 TABLET, CHEWABLE ORAL at 22:00

## 2023-01-30 RX ADMIN — LEVETIRACETAM 1500 MILLIGRAM(S): 250 TABLET, FILM COATED ORAL at 05:40

## 2023-01-30 RX ADMIN — LEVETIRACETAM 750 MILLIGRAM(S): 250 TABLET, FILM COATED ORAL at 17:20

## 2023-01-30 NOTE — PROGRESS NOTE ADULT - ATTENDING COMMENTS
Focal seizures poorly controlled with current dose of Keppra, will reload with 1500mg and increase maintanence dose to 1gm BID.    Ramesh Jhaveri MD  Neurology Attending Physician
Agree with above - Jolynn Winkler MD
Still with subtle seizure activity.  Will increase Vimpat to 150mg bid.  Continue EEG.

## 2023-01-30 NOTE — EEG REPORT - NS EEG TEXT BOX
Day 5 – 	Start: 1/28/2023  08:00  	End: 1/29/2023   08:00   	Duration: 24 hr     Daily EEG Visual Analysis    FINDINGS:  The background was continuous, symmetric, spontaneously variable and reactive. During wakefulness, the posterior dominant rhythm consisted of symmetric, well-modulated 8-9 Hz activity, with amplitude to 30 uV, that attenuated to eye opening.  Low amplitude frontal beta was noted in wakefulness. Anterior to posterior gradient is present. No Breach rhythms.     Background Slowing:  No generalized background slowing was present.    Focal Slowing:   Intermittent left temporal theta and polymorphic delta activity.   Intermittent right temporal polymorphic delta slowing.     Sleep Background:  Drowsiness was characterized by fragmentation, attenuation, and slowing of the background activity.    Sleep was characterized by the presence of symmetric sleep spindles and K-complexes.    Other Non-Epileptiform Findings:  Abundance of asynchronous left occipitally predominant lambda waves, patient is seen reading a journal    Activation Procedures:   Hyperventilation was not performed.    Photic stimulation not performed     Interictal Epileptiform Activity:   None    Seizures:  None     Events:   Multiple push button events without gross EEG changes or tachycardia (>100 BPM). Patient reported an unusual feeling at times dizzy atypical of his usual seizure semiology.     Artifacts:  Intermittent myogenic and movement artifacts were noted.    ECG:  The heart rate on single channel ECG was predominantly between  BPM.     AEDs: LEV 1500 mg BID, LCM 100mg BID         EEG Summary:    Abnormal EEG in the awake, drowsy and asleep states.    Intermittent theta & polymorphic delta slowing, focal, left temporal  Intermittent polymorphic delta slowing, focal, right temporal       Impression/Clinical Correlate:    This is an abnormal EEG record.     No seizures, last seizure captured at 17:23 PM on 1/28/23.   Regional cortical dysfunction in left temporal region.   Multiple push button events with no epileptiform EEG abnormalities.            Day 5 – 	Start: 1/28/2023  08:00  	End: 1/29/2023   08:00   	Duration: 24 hr     Daily EEG Visual Analysis    FINDINGS:  The background was continuous, symmetric, spontaneously variable and reactive. During wakefulness, the posterior dominant rhythm consisted of symmetric, well-modulated 8-9 Hz activity, with amplitude to 30 uV, that attenuated to eye opening.  Low amplitude frontal beta was noted in wakefulness. Anterior to posterior gradient is present. No Breach rhythms.     Background Slowing:  No generalized background slowing was present.    Focal Slowing:   Intermittent left temporal theta and polymorphic delta activity.   Intermittent right temporal polymorphic delta slowing.     Sleep Background:  Drowsiness was characterized by fragmentation, attenuation, and slowing of the background activity.    Sleep was characterized by the presence of symmetric sleep spindles and K-complexes.    Other Non-Epileptiform Findings:  Abundance of asynchronous left occipitally predominant lambda waves, patient is seen reading a journal    Activation Procedures:   Hyperventilation was not performed.    Photic stimulation not performed     Interictal Epileptiform Activity:   None    Seizures:  None     Events:   Multiple push button events without gross EEG changes or tachycardia (>100 BPM). Patient reported an unusual feeling at times dizzy atypical of his usual seizure semiology.     Artifacts:  Intermittent myogenic and movement artifacts were noted.    ECG:  The heart rate on single channel ECG was predominantly between  BPM.     AEDs: LEV 1500 mg BID, LCM 200mg BID         EEG Summary:    Abnormal EEG in the awake, drowsy and asleep states.    Intermittent theta & polymorphic delta slowing, focal, left temporal  Intermittent polymorphic delta slowing, focal, right temporal       Impression/Clinical Correlate:    This is an abnormal EEG record.     No seizures, last seizure captured at 17:23 PM on 1/28/23.   Regional cortical dysfunction in left temporal region.   Multiple push button events with no epileptiform EEG abnormalities.            Day 6 – 	Start: 1/29/2023  08:00  	End: 1/30/2023   08:00   	Duration: 24 hr     Daily EEG Visual Analysis    FINDINGS:  The background was continuous, symmetric, spontaneously variable and reactive. During wakefulness, the posterior dominant rhythm consisted of symmetric, well-modulated 8-9 Hz activity, with amplitude to 30 uV, that attenuated to eye opening.  Low amplitude frontal beta was noted in wakefulness. Anterior to posterior gradient is present. No Breach rhythms.     Background Slowing:  No generalized background slowing was present.    Focal Slowing:   Intermittent left temporal theta and polymorphic delta activity.   Intermittent right temporal polymorphic delta slowing.     Sleep Background:  Drowsiness was characterized by fragmentation, attenuation, and slowing of the background activity.    Sleep was characterized by the presence of symmetric sleep spindles and K-complexes.    Other Non-Epileptiform Findings:  Abundance of asynchronous left occipitally predominant lambda waves, patient is seen reading a journal    Activation Procedures:   Hyperventilation was not performed.    Photic stimulation not performed     Interictal Epileptiform Activity:   None    Seizures:  None     Events:   Multiple push button events without gross EEG changes or tachycardia (>100 BPM). Patient reported an unusual feeling at times dizzy atypical of his usual seizure semiology.     Artifacts:  Intermittent myogenic and movement artifacts were noted.    ECG:  The heart rate on single channel ECG was predominantly between  BPM.     AEDs: LEV 1500 mg BID, LCM 200mg BID         EEG Summary:    Abnormal EEG in the awake, drowsy and asleep states.    Intermittent theta & polymorphic delta slowing, focal, left temporal  Intermittent polymorphic delta slowing, focal, right temporal       Impression/Clinical Correlate:    This is an abnormal EEG record.     No seizures, last seizure captured at 17:23 PM on 1/28/23.   Regional cortical dysfunction in left temporal region.   Multiple push button events without epileptiform EEG abnormalities.

## 2023-01-30 NOTE — PROGRESS NOTE ADULT - SUBJECTIVE AND OBJECTIVE BOX
SUBJECTIVE/INTERVAL HISTORY:  - No acute events overnight. No complaints. Patient would like to go home today    PAST MEDICAL & SURGICAL HISTORY:  Asthma  Sinusitis  Status post left rotator cuff repair    FAMILY HISTORY:  No pertinent family history in first degree relatives      MEDICATIONS  (STANDING):  amoxicillin  500 milliGRAM(s)/clavulanate 1 Tablet(s) Oral every 12 hours  enoxaparin Injectable 40 milliGRAM(s) SubCutaneous every 24 hours  fluticasone propionate 50 MICROgram(s)/spray Nasal Spray 2 Spray(s) Both Nostrils two times a day  lacosamide 150 milliGRAM(s) Oral two times a day  levETIRAcetam 1500 milliGRAM(s) Oral two times a day  melatonin 3 milliGRAM(s) Oral at bedtime  montelukast 10 milliGRAM(s) Oral daily    MEDICATIONS  (PRN):  albuterol    90 MICROgram(s) HFA Inhaler 2 Puff(s) Inhalation every 6 hours PRN Shortness of Breath and/or Wheezing  lacosamide Injectable 200 milliGRAM(s) IV Push once PRN GTC > 3 min  LORazepam   Injectable 2 milliGRAM(s) IV Push once PRN Seizure Activity      ALLERGIES/INTOLERANCES:  Allergies  No Known Allergies    Intolerances    Vital Signs Last 24 Hrs  T(C): 36.7 (30 Jan 2023 05:12), Max: 36.7 (29 Jan 2023 16:52)  T(F): 98 (30 Jan 2023 05:12), Max: 98 (29 Jan 2023 16:52)  HR: 66 (30 Jan 2023 05:12) (61 - 71)  BP: 123/67 (30 Jan 2023 05:12) (103/64 - 132/77)  RR: 18 (30 Jan 2023 05:12) (17 - 18)  SpO2: 96% (30 Jan 2023 05:12) (94% - 96%)    General:  Constitutional: normal Male, appears stated age, in no apparent distress including pain  Head: Normocephalic & atraumatic.    Neurological (>12):  MS: Eyes open while eating. Awake, alert, oriented to person, place, situation, time. Normal affect. Follows all commands.  Language: Speech is clear, fluent with good repetition & comprehension  CNs: EOMI no nystagmus, no diplopia. V1-3 intact to LT, well developed masseter muscles b/l. No facial asymmetry b/l, full eye closure strength b/l.   Motor: Normal muscle bulk & tone. No noticeable tremor or seizure. No pronator drift. All extremities are antigravity  Sensation: Intact to LT b/l throughout.   Cortical: Extinction on DSS (neglect): none  Coordination: intact rapid-alt movements. No dysmetria to FTN  Gait: No postural instability. Normal stance and tandem gait.       STUDIES & IMAGING:  EEG report 1/29/23:  Impression/Clinical Correlate:  This is an abnormal EEG record.   At least 5 electrographic left temporal seizures, characterized by oral automatisms, cursing and tachycardia.  Last seizure at 17:23 PM on 1/28/23.   Potential epileptic activity in left anterior temporal area.    Regional cortical dysfunction in the left temporal region.   Multiple push button events with no epileptiform EEG abnormalities noted, at times patient is hyperventilating and tachycardic, concerning for possible surface negative seizures.     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  5.	At least 6 electrographic seizures, at times seen with subtle left frontotemporal electrographic changes, tachycardia, clinically characterized by oral automatisms, grimacing, cursing and hyperventilation. Awareness is preserved.   6.	Rare left anterior temporal sharp waves discharges, F7/T7 maximal  7.	Intermittent left temporal theta and polymorphic delta activity   Impression/Clinical Correlate:  This is an abnormal EEG record.   At least 6 electrographic left temporal seizures, characterized by oral automatisms, cursing and tachycardia.  Last seizure at 11:35 AM on 1/28/23.   Potential epileptic activity in left anterior temporal area.    Regional cortical dysfunction in the left temporal region.  SUBJECTIVE/INTERVAL HISTORY:  - No acute events overnight. No complaints. Patient would like to go home today    PAST MEDICAL & SURGICAL HISTORY:  Asthma  Sinusitis  Status post left rotator cuff repair    FAMILY HISTORY:  No pertinent family history in first degree relatives      MEDICATIONS  (STANDING):  amoxicillin  500 milliGRAM(s)/clavulanate 1 Tablet(s) Oral every 12 hours  enoxaparin Injectable 40 milliGRAM(s) SubCutaneous every 24 hours  fluticasone propionate 50 MICROgram(s)/spray Nasal Spray 2 Spray(s) Both Nostrils two times a day  lacosamide 150 milliGRAM(s) Oral two times a day  levETIRAcetam 1500 milliGRAM(s) Oral two times a day  melatonin 3 milliGRAM(s) Oral at bedtime  montelukast 10 milliGRAM(s) Oral daily    MEDICATIONS  (PRN):  albuterol    90 MICROgram(s) HFA Inhaler 2 Puff(s) Inhalation every 6 hours PRN Shortness of Breath and/or Wheezing  lacosamide Injectable 200 milliGRAM(s) IV Push once PRN GTC > 3 min  LORazepam   Injectable 2 milliGRAM(s) IV Push once PRN Seizure Activity      ALLERGIES/INTOLERANCES:  Allergies  No Known Allergies    Intolerances    Vital Signs Last 24 Hrs  T(C): 36.7 (30 Jan 2023 05:12), Max: 36.7 (29 Jan 2023 16:52)  T(F): 98 (30 Jan 2023 05:12), Max: 98 (29 Jan 2023 16:52)  HR: 66 (30 Jan 2023 05:12) (61 - 71)  BP: 123/67 (30 Jan 2023 05:12) (103/64 - 132/77)  RR: 18 (30 Jan 2023 05:12) (17 - 18)  SpO2: 96% (30 Jan 2023 05:12) (94% - 96%)    General:  Constitutional: normal Male, appears stated age, in no apparent distress including pain  Head: Normocephalic & atraumatic.    Neurological (>12):  MS: Eyes open while eating. Awake, alert, oriented to person, place, situation, time. Normal affect. Follows all commands.  Language: Speech is clear, fluent with good repetition & comprehension  CNs: EOMI no nystagmus, no diplopia. V1-3 intact to LT, well developed masseter muscles b/l. No facial asymmetry b/l, full eye closure strength b/l.   Motor: Normal muscle bulk & tone. No noticeable tremor or seizure. No pronator drift. All extremities are antigravity  Sensation: Intact to LT b/l throughout.   Cortical: Extinction on DSS (neglect): none  Coordination: intact rapid-alt movements. No dysmetria to FTN  Gait: No postural instability. Normal stance and tandem gait.       STUDIES & IMAGING:  EEG report 1/29/23:  Impression/Clinical Correlate:  This is an abnormal EEG record.   At least 5 electrographic left temporal seizures, characterized by oral automatisms, cursing and tachycardia.  Last seizure at 17:23 PM on 1/28/23.   Potential epileptic activity in left anterior temporal area.    Regional cortical dysfunction in the left temporal region.   Multiple push button events with no epileptiform EEG abnormalities noted, at times patient is hyperventilating and tachycardic, concerning for possible surface negative seizures.     MRI Brain MRN [id number] showed a lesion in the left amygdalar region (neoplastic vs inflammatory vs infectious)    EEG 1/28/23:  EEG Summary:  Abnormal EEG in the awake, drowsy and asleep states.  5.	At least 6 electrographic seizures, at times seen with subtle left frontotemporal electrographic changes, tachycardia, clinically characterized by oral automatisms, grimacing, cursing and hyperventilation. Awareness is preserved.   6.	Rare left anterior temporal sharp waves discharges, F7/T7 maximal  7.	Intermittent left temporal theta and polymorphic delta activity   Impression/Clinical Correlate:  This is an abnormal EEG record.   At least 6 electrographic left temporal seizures, characterized by oral automatisms, cursing and tachycardia.  Last seizure at 11:35 AM on 1/28/23.   Potential epileptic activity in left anterior temporal area.    Regional cortical dysfunction in the left temporal region.         EKG: NSR 65, , QTc 420

## 2023-01-30 NOTE — PROGRESS NOTE ADULT - ASSESSMENT
76yo M with Hx asthma and sinusitis presented to EMU due to abnormal outpatient EEG. 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 EEG on 1/28 revealed at least 6 electrographic L temporal lobe seizures, presented as oral automatism, cursing, and tachycardia. vEEG report 1/29 pending but leads came off this overnight.     Impression: few months of intermittent lightheaded episodes, found to have c/f L temporal seizures 2/2 focal seizures. 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 (neoplastic vs inflammatory vs infectious)    Plan:  Neuro  [] C/w vEEG - 5 total seizures (please see separate EEG report)  [] C/w keppra 1500mg bid and Vimpat to 150 mg bid  [] S/p LP on 1/25/23; neg csf protein, glucose; pending inflammatory/infectious/autoimmune panel  pending ACE, HSV, autoimmune, paraneoplastic panel, cytology and cytopathology   [] rescue medications: 1) Ativan 1mg 1x for GTC > 3 min, 2) Vimpat 200mg 1x  [] neuro checks and vitals q4h  [] fall/seizure precautions    # Pulmonary nodules found on CT CAP  few, small 5mm   f/u CT in 3-6 months (4/23-7/23) with pulmonology (# [phone])  no further inpatient work up    #asthma  c/w home albuterol 90 2x puffs q6h PRN for SOB/wheezing  c/w Breo Ellipta 200-25 daily    #sinusitis  c/w home Augmentin 500mg BID for 10 days (1/21-1/30)  c/w home montelukast 10mg daily  c/w home Flonase 2 sprays BID 76yo M with Hx asthma and sinusitis presented to EMU due to abnormal outpatient EEG. 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 EEG on 1/28 revealed at least 6 electrographic L temporal lobe seizures, presented as oral automatism, cursing, and tachycardia. vEEG report 1/29 pending but leads came off this overnight.     Impression: few months of intermittent lightheaded episodes, found to have c/f L temporal seizures 2/2 focal seizures. 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 Seizure frequency responding to addition of Vimpat better than initiation of Keppra. Attempting to switch to Vimpat Monotherapy.     Plan:  [] Continue VEEG  [] MRI spectroscopy for suspected nonenhancing brain mass  [] Increase Vimpat to 200mg PO BID, additional 50mg PO x 1 dose now  [] Decrease Keppra to 750mg PO BID  [] Dr Davidson to evaluate patient for potential biopsy- Dr Sweet contacted Dr Davidson directly    [] rescue medications: 1) Ativan 1mg 1x for GTC > 3 min, 2) Vimpat 200mg 1x  [] neuro checks and vitals q4h  [] fall/seizure precautions    # Pulmonary nodules found on CT CAP  few, small 5mm   f/u CT in 3-6 months (4/23-7/23) with pulmonology (# [phone])  no further inpatient work up    #asthma  c/w home albuterol 90 2x puffs q6h PRN for SOB/wheezing  c/w Breo Ellipta 200-25 daily    #sinusitis  c/w home Augmentin 500mg BID for 10 days (1/21-1/30)  c/w home montelukast 10mg daily  c/w home Flonase 2 sprays BID 78yo M with Hx asthma and sinusitis presented to EMU due to abnormal outpatient EEG. 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 EEG on 1/28 revealed at least 6 electrographic L temporal lobe seizures, presented as oral automatism, cursing, and tachycardia. vEEG report 1/30 pending, no clear seizures on preliminary discussion.    Impression: few months of intermittent lightheaded episodes, found to have L temporal focal seizures. Etiology likely 2/2 a lesion in the left medial temporal/amygdalar region (neoplastic vs inflammatory vs infectious). Seizure frequency responding to addition of Vimpat better than initiation of Keppra. Attempting to switch to Vimpat Monotherapy.     Plan:  [] Continue VEEG  [] MRI spectroscopy for suspected nonenhancing brain mass  [] Increase Vimpat to 200mg PO BID, additional 50mg PO x 1 dose now  [] Decrease Keppra to 750mg PO BID  [] Dr Davidson to evaluate patient for potential biopsy- Dr Sweet contacted Dr Davidson directly, and neuro-oncology    [] follow up pending CSF results including flow cytometry and cytopathology; add on IgG index and oligoclonal bands  [] rescue medications: 1) Ativan 1mg 1x for GTC > 3 min, 2) Vimpat 200mg 1x  [] neuro checks and vitals q4h  [] fall/seizure precautions    # Pulmonary nodules found on CT CAP - possible lymph nodes  few, small, up to 5mm   f/u CT in 3-6 months (4/23-7/23) with pulmonology (# [phone])  pt was informed of nodules/lymph nodes. he reports hx of working at [employer] when they collapsed, hx of asthma    # R Adrenal nodule 1.2 cm  pt was informed  follow up with primary care    #asthma  c/w home albuterol 90 2x puffs q6h PRN for SOB/wheezing  c/w Breo Ellipta 200-25 daily    #sinusitis  c/w home Augmentin 500mg BID for 10 days (1/21-1/30)  c/w home montelukast 10mg daily  c/w home Flonase 2 sprays BID 76yo M with Hx asthma and sinusitis presented to EMU due to abnormal outpatient EEG. 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 EEG on 1/28 revealed at least 6 electrographic L temporal lobe seizures, presented as oral automatism, cursing, and tachycardia. vEEG report 1/30 pending, no clear seizures on preliminary discussion.    Impression: few months of intermittent lightheaded episodes, found to have L temporal focal seizures. Etiology likely 2/2 a lesion in the left medial temporal/amygdalar region (neoplastic vs inflammatory vs infectious). Seizure frequency responding to addition of Vimpat better than initiation of Keppra. Attempting to switch to Vimpat Monotherapy.     Plan:  [] Continue VEEG  [] MRI spectroscopy for suspected nonenhancing brain mass  [] Increase Vimpat to 200mg PO BID, additional 50mg PO x 1 dose now  [] Decrease Keppra to 750mg PO BID  [] Dr Davidson to evaluate patient for potential biopsy- Dr Sweet contacted Dr Davidson directly, and neuro-oncology    [] follow up pending CSF results including flow cytometry and cytopathology  [] rescue medications: 1) Ativan 1mg 1x for GTC > 3 min, 2) Vimpat 200mg 1x  [] neuro checks and vitals q4h  [] fall/seizure precautions    # Pulmonary nodules found on CT CAP - possible lymph nodes  few, small, up to 5mm   f/u CT in 3-6 months (4/23-7/23) with pulmonology (# [phone])  pt was informed of nodules/lymph nodes. he reports hx of working at [employer] when they collapsed, hx of asthma    # R Adrenal nodule 1.2 cm  pt was informed  follow up with primary care    #asthma  c/w home albuterol 90 2x puffs q6h PRN for SOB/wheezing  c/w Breo Ellipta 200-25 daily    #sinusitis  c/w home Augmentin 500mg BID for 10 days (1/21-1/30)  c/w home montelukast 10mg daily  c/w home Flonase 2 sprays BID

## 2023-01-31 ENCOUNTER — TRANSCRIPTION ENCOUNTER (OUTPATIENT)
Age: 78
End: 2023-01-31

## 2023-01-31 VITALS — SYSTOLIC BLOOD PRESSURE: 131 MMHG | HEART RATE: 90 BPM | OXYGEN SATURATION: 95 % | DIASTOLIC BLOOD PRESSURE: 66 MMHG

## 2023-01-31 PROCEDURE — 99223 1ST HOSP IP/OBS HIGH 75: CPT | Mod: FS

## 2023-01-31 RX ORDER — LEVETIRACETAM 250 MG/1
1 TABLET, FILM COATED ORAL
Qty: 60 | Refills: 0
Start: 2023-01-31 | End: 2023-03-01

## 2023-01-31 RX ORDER — LACOSAMIDE 50 MG/1
1 TABLET ORAL
Qty: 60 | Refills: 0
Start: 2023-01-31 | End: 2023-03-01

## 2023-01-31 RX ADMIN — Medication 2 SPRAY(S): at 05:36

## 2023-01-31 RX ADMIN — LACOSAMIDE 200 MILLIGRAM(S): 50 TABLET ORAL at 04:24

## 2023-01-31 RX ADMIN — LEVETIRACETAM 750 MILLIGRAM(S): 250 TABLET, FILM COATED ORAL at 04:24

## 2023-01-31 RX ADMIN — Medication 1 TABLET(S): at 04:25

## 2023-01-31 NOTE — EEG REPORT - NS EEG TEXT BOX
Day 7  – 	Start: 1/30/2023  08:00  	End: 1/31/2023   08:00   	Duration: 19 hr   Disconnected [id number] (5 hr) - MRI procedure    Daily EEG Visual Analysis    FINDINGS:  The background was continuous, symmetric, spontaneously variable and reactive. During wakefulness, the posterior dominant rhythm consisted of symmetric, well-modulated 8-9 Hz activity, with amplitude to 30 uV, that attenuated to eye opening.  Low amplitude frontal beta was noted in wakefulness. Anterior to posterior gradient is present. No Breach rhythms.     Background Slowing:  No generalized background slowing was present.    Focal Slowing:   Intermittent left temporal theta and polymorphic delta activity.   Intermittent right temporal polymorphic delta slowing.     Sleep Background:  Drowsiness was characterized by fragmentation, attenuation, and slowing of the background activity.    Sleep was characterized by the presence of symmetric sleep spindles and K-complexes.    Other Non-Epileptiform Findings:  None     Activation Procedures:   Hyperventilation was not performed.    Photic stimulation not performed     Interictal Epileptiform Activity:   None    Seizures:  None     Events:   One push button event for dizziness, no associated EEG change, no tachycardia atypical of his seizure semiology.     Artifacts:  Intermittent myogenic and movement artifacts were noted. There were nearly continuous left occipital theta / delta activity only effecting the O1 channel that was felt to be due to poor electrode impedence.     ECG:  The heart rate on single channel ECG was predominantly between  BPM.     AEDs: LEV 1500 mg BID, LCM 200mg BID         EEG Summary:    Abnormal EEG in the awake, drowsy and asleep states.    Intermittent theta & polymorphic delta slowing, focal, left temporal  One push button event without associated EEG abnormality.     Impression/Clinical Correlate:    This is an abnormal EEG record.     No seizures, last seizure captured at 17:23 PM on 1/28/23.   Regional cortical dysfunction in left temporal region.   One push button event without associated epileptiform EEG abnormality.            Day 7  – 	Times:                       1/30/2023 08:00-19:53  01/31/2023 00:40-04:47 and 05:02-10:58  	Duration: 21 hr 56 min  Disconnected [id number] (approximately 5 hr) for MRI    Daily EEG Visual Analysis    FINDINGS:  The background was continuous, symmetric, and spontaneously variable. During wakefulness, the posterior dominant rhythm consisted of symmetric, well-modulated 9-Hz activity that attenuated to eye opening.  Low amplitude frontal beta was present in wakefulness. Anterior to posterior gradient is present. No breach rhythm.     Background Slowing:  None    Focal Slowing:   Rare left temporal polymorphic delta activity.     Sleep Background:  Drowsiness was characterized by fragmentation, attenuation, and slowing of the background activity.    Stage 2 sleep was characterized by the presence of symmetric sleep spindles and K-complexes.    Other Non-Epileptiform Findings:  Frequent left occipital theta / delta activity in only the O1 channel, of uncertain significance.    Activation Procedures:   Hyperventilation was not performed.    Photic stimulation not performed     Interictal Epileptiform Activity:   None    Seizures:  None     Events:   01/30/23 17:34: One push-button event for dizziness, no associated EEG change, no tachycardia - not typical for his seizure semiology.     Artifacts:  Intermittent myogenic and movement artifacts were present.    ECG:  The heart rate on single-channel ECG was predominantly between  BPM with regular rhythm.     ASMs: LEV 1500/750 mg (01/30/23) and  mg BID (01/31/23), LCM 200mg BID         EEG Summary:    Abnormal EEG in the awake, drowsy and asleep states.    Rare left temporal focal slowing  One push-button event without associated EEG abnormality.     Impression/Clinical Correlate:    This is an abnormal EEG record.     No seizures, last seizure captured at 17:23 PM on 1/28/23.   Left temporal focal cerebral dysfunction can be structural or functional in etiology.  One push-button event without associated epileptiform EEG abnormality.

## 2023-01-31 NOTE — PROGRESS NOTE ADULT - NS ATTEND AMEND GEN_ALL_CORE FT
Case discussed with Britni Davidson and Volodymyr of neurosurgery and neurooncology, respectively.  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  seizures coming under control since addition of lacosamide.  Patient to be presented at tumor board.  at present, dr. Davidson favors following lesion with MRI, as biopsy may risk irritating seizure focus without altering management for low grade glioma.

## 2023-01-31 NOTE — DISCHARGE NOTE NURSING/CASE MANAGEMENT/SOCIAL WORK - NSDCPEFALRISK_GEN_ALL_CORE
For information on Fall & Injury Prevention, visit: [email or web address] OR  [email or web address] OR  https://www.cdc.gov/steadi/patient.html

## 2023-01-31 NOTE — CONSULT NOTE ADULT - SUBJECTIVE AND OBJECTIVE BOX
Mr. Albarran is a 78 yo man admitted to [facility] on 1/24 for evaluation of seizures.  History reviewed - patient seen and examined.  Mr. Albarran reports episode of "lightheadedness" which began in October. Recently these were more frequent prompting admission to the hospital.  EEG 1/25:  This is an abnormal EEG record.     Potential epileptic activity in left anterior temporal lobe   Left temporal lobe regional cortical dysfunction   No epileptiform pattern or seizures were seen.    EEG 1/26 showed at least 11 focal electrographic seizures - he has improved on Keppra 750 mg bid.    I have reviewed an MRI obtained on 1/24 which shows some left temporal, hippocampal enlargement - seen best on flair imaging without enhancement.  Spect was performed yesterday - results pending.  CT C/A/P showed a few subpleural nodules < 5 mm and an indeterminate 1.2 cm right adrenal nodule.    He also underwent an LP - WBC 1, RBC 13, protein 41, and glucose 62  Cultures unremarkable.  Flow cytometry unremarkable.  Cytology pending.    On exam, he is awake and alert - oriented and fluent with normal mental status  EOMI, VFF  Face symmetric and tongue midline  No drift noted  FFM equal bilaterally  Una intact  Strength full in UE and LE.

## 2023-01-31 NOTE — DISCHARGE NOTE NURSING/CASE MANAGEMENT/SOCIAL WORK - PATIENT PORTAL LINK FT
You can access the FollowMyHealth Patient Portal offered by [facility] by registering at the following website: [email or web address]. By joining [facility]’s FollowMyHealth portal, you will also be able to view your health information using other applications (apps) compatible with our system.

## 2023-01-31 NOTE — PROGRESS NOTE ADULT - SUBJECTIVE AND OBJECTIVE BOX
THE PATIENT WAS SEEN AND EXAMINED BY ME WITH THE HOUSE STAFF DURING MORNING ROUNDS.     HPI:  78y M with Hx asthma, sinusitis, who presents w/ CC of abnormal EEG. Since October 2022, he has been having intermittent lightheaded episodes that last 5-6 seconds. He maintains awareness and afterwards is back to his mental baseline. No associated convulsions, tongue biting, urinary incontinence, visual aura, or post-ictal period. For the past week, episodes have been occurring 6-7x daily. He has been on outpatient EEG per Dr. Thakkar, which found possible L temporal seizure w/ concern for risk to generalize. He was sent in by Dr. Sweet for admission to EMU. No prior trial of AEDs. He notes that he was started on 10-day course of antibiotics for sinusitis, per PCP, and took first dose on Saturday 1/21/23 (24 Jan 2023 15:51)    ROS: Otherwise negative.     SUBJECTIVE: No events overnight.  No new neurologic complaints.      albuterol    90 MICROgram(s) HFA Inhaler 2 Puff(s) Inhalation every 6 hours PRN  enoxaparin Injectable 40 milliGRAM(s) SubCutaneous every 24 hours  fluticasone propionate 50 MICROgram(s)/spray Nasal Spray 2 Spray(s) Both Nostrils two times a day  lacosamide 200 milliGRAM(s) Oral two times a day  lacosamide Injectable 200 milliGRAM(s) IV Push once PRN  levETIRAcetam 750 milliGRAM(s) Oral two times a day  LORazepam   Injectable 2 milliGRAM(s) IV Push once PRN  melatonin 3 milliGRAM(s) Oral at bedtime  montelukast 10 milliGRAM(s) Oral daily      Physical Exam: Neurological Exam:  	Mental Status: Orientated to self, date and place.  Attention intact.  No dysarthria, aphasia or neglect.  	Cranial Nerves: PERRL, EOMI, no nystagmus or diplopia. No facial asymmetry.  Hearing intact to finger rub bilaterally.  Tongue, uvula and palate midline.  Sternocleidomastoid and Trapezius intact bilaterally  	Motor: moving all 4 extremities equally w 5/5 strength  	Tone: normal.                  	Pronator drift: none                 	Dysmetria: None to finger-nose-finger  	No truncal ataxia.    	Tremor: No resting, postural or action tremor.   	Sensation: intact to light touch    Vital Signs Last 24 Hrs  T(C): 37 (31 Jan 2023 05:30), Max: 37 (31 Jan 2023 05:30)  T(F): 98.6 (31 Jan 2023 05:30), Max: 98.6 (31 Jan 2023 05:30)  HR: 57 (31 Jan 2023 05:30) (57 - 70)  BP: 118/69 (31 Jan 2023 05:30) (108/68 - 130/74)  RR: 18 (31 Jan 2023 05:30) (18 - 18)  SpO2: 93% (31 Jan 2023 05:30) (93% - 97%)      IMAGING:    EEG 1/30/23:   This is an abnormal EEG record.     No seizures, last seizure captured at 17:23 PM on 1/28/23.   Regional cortical dysfunction in left temporal region.   Multiple push button events with no epileptiform EEG abnormalities.     EEG report 1/29/23:  Impression/Clinical Correlate:  This is an abnormal EEG record.   At least 5 electrographic left temporal seizures, characterized by oral automatisms, cursing and tachycardia.  Last seizure at 17:23 PM on 1/28/23.   Potential epileptic activity in left anterior temporal area.    Regional cortical dysfunction in the left temporal region.   Multiple push button events with no epileptiform EEG abnormalities noted, at times patient is hyperventilating and tachycardic, concerning for possible surface negative seizures.     MRI Brain MRN [id number] showed a lesion in the left amygdalar region (neoplastic vs inflammatory vs infectious)    EEG 1/28/23:  EEG Summary:  Abnormal EEG in the awake, drowsy and asleep states.  5.	At least 6 electrographic seizures, at times seen with subtle left frontotemporal electrographic changes, tachycardia, clinically characterized by oral automatisms, grimacing, cursing and hyperventilation. Awareness is preserved.   6.	Rare left anterior temporal sharp waves discharges, F7/T7 maximal  7.	Intermittent left temporal theta and polymorphic delta activity   Impression/Clinical Correlate:  This is an abnormal EEG record.   At least 6 electrographic left temporal seizures, characterized by oral automatisms, cursing and tachycardia.  Last seizure at 11:35 AM on 1/28/23.   Potential epileptic activity in left anterior temporal area.    Regional cortical dysfunction in the left temporal region.         EKG: NSR 65, , QTc 420     THE PATIENT WAS SEEN AND EXAMINED BY ME WITH THE HOUSE STAFF DURING MORNING ROUNDS.     HPI:  78y M with Hx asthma, sinusitis, who presents w/ CC of abnormal EEG. Since October 2022, he has been having intermittent lightheaded episodes that last 5-6 seconds. He maintains awareness and afterwards is back to his mental baseline. No associated convulsions, tongue biting, urinary incontinence, visual aura, or post-ictal period. For the past week, episodes have been occurring 6-7x daily. He has been on outpatient EEG per Dr. Thakkar, which found possible L temporal seizure w/ concern for risk to generalize. He was sent in by Dr. Sweet for admission to EMU. No prior trial of AEDs. He notes that he was started on 10-day course of antibiotics for sinusitis, per PCP, and took first dose on Saturday 1/21/23 (24 Jan 2023 15:51)    ROS: Otherwise negative.     SUBJECTIVE: No events overnight.  No new neurologic complaints.      albuterol    90 MICROgram(s) HFA Inhaler 2 Puff(s) Inhalation every 6 hours PRN  enoxaparin Injectable 40 milliGRAM(s) SubCutaneous every 24 hours  fluticasone propionate 50 MICROgram(s)/spray Nasal Spray 2 Spray(s) Both Nostrils two times a day  lacosamide 200 milliGRAM(s) Oral two times a day  lacosamide Injectable 200 milliGRAM(s) IV Push once PRN  levETIRAcetam 750 milliGRAM(s) Oral two times a day  LORazepam   Injectable 2 milliGRAM(s) IV Push once PRN  melatonin 3 milliGRAM(s) Oral at bedtime  montelukast 10 milliGRAM(s) Oral daily      Physical Exam: Neurological Exam:  	Mental Status: Orientated to self, date and place.  Attention intact.  No dysarthria, aphasia or neglect.  	Cranial Nerves: PERRL, EOMI, no nystagmus or diplopia. No facial asymmetry.  Hearing intact to finger rub bilaterally.   	Motor: moving all 4 extremities equally w 5/5 strength                           	Tremor: No resting, postural or action tremor.   	Sensation: intact to light touch    Vital Signs Last 24 Hrs  T(C): 37 (31 Jan 2023 05:30), Max: 37 (31 Jan 2023 05:30)  T(F): 98.6 (31 Jan 2023 05:30), Max: 98.6 (31 Jan 2023 05:30)  HR: 57 (31 Jan 2023 05:30) (57 - 70)  BP: 118/69 (31 Jan 2023 05:30) (108/68 - 130/74)  RR: 18 (31 Jan 2023 05:30) (18 - 18)  SpO2: 93% (31 Jan 2023 05:30) (93% - 97%)      IMAGING:    EEG 1/30/23:   This is an abnormal EEG record.     No seizures, last seizure captured at 17:23 PM on 1/28/23.   Regional cortical dysfunction in left temporal region.   Multiple push button events with no epileptiform EEG abnormalities.     EEG report 1/29/23:  Impression/Clinical Correlate:  This is an abnormal EEG record.   At least 5 electrographic left temporal seizures, characterized by oral automatisms, cursing and tachycardia.  Last seizure at 17:23 PM on 1/28/23.   Potential epileptic activity in left anterior temporal area.    Regional cortical dysfunction in the left temporal region.   Multiple push button events with no epileptiform EEG abnormalities noted, at times patient is hyperventilating and tachycardic, concerning for possible surface negative seizures.     MRI Brain MRN [id number] showed a lesion in the left amygdalar region (neoplastic vs inflammatory vs infectious)    EEG 1/28/23:  EEG Summary:  Abnormal EEG in the awake, drowsy and asleep states.  5.	At least 6 electrographic seizures, at times seen with subtle left frontotemporal electrographic changes, tachycardia, clinically characterized by oral automatisms, grimacing, cursing and hyperventilation. Awareness is preserved.   6.	Rare left anterior temporal sharp waves discharges, F7/T7 maximal  7.	Intermittent left temporal theta and polymorphic delta activity   Impression/Clinical Correlate:  This is an abnormal EEG record.   At least 6 electrographic left temporal seizures, characterized by oral automatisms, cursing and tachycardia.  Last seizure at 11:35 AM on 1/28/23.   Potential epileptic activity in left anterior temporal area.    Regional cortical dysfunction in the left temporal region.         EKG: NSR 65, , QTc 420

## 2023-01-31 NOTE — CONSULT NOTE ADULT - ASSESSMENT
In summary, this is a 78 yo man with new onset left temporal seizures and MRI with left temporal fullness and flair change.  Thus far CSF unrevealing.  Will check Spectroscopy and present at tumor board meeting tomorrow am.

## 2023-01-31 NOTE — PROGRESS NOTE ADULT - ASSESSMENT
76 YO M with Hx asthma and sinusitis presented to EMU due to abnormal outpatient EEG. Outpatient EEG showed L temporal lobe seizures and MRI showed L amygdala lesion (neoplastic vs inflammatory vs infectious). EEG on 1/28 revealed at least 6 electrographic L temporal lobe seizures, presented as oral automatism, cursing, and tachycardia. vEEG report 1/30 pending, no clear seizures on preliminary discussion.    Impression: few months of intermittent lightheaded episodes, found to have L temporal focal seizures. Etiology likely 2/2 a lesion in the left medial temporal/amygdalar region (neoplastic vs inflammatory vs infectious). Seizure frequency responding to addition of Vimpat better than initiation of Keppra. Attempting to switch to Vimpat Monotherapy.     Plan:  [] Continue VEEG  [] MRI spectroscopy for suspected nonenhancing brain mass  [] Increase Vimpat to 200mg PO BID, additional 50mg PO x 1 dose now  [] Decrease Keppra to 750mg PO BID  [] Dr Davidson to evaluate patient for potential biopsy- Dr Sweet contacted Dr Davidson directly, and neuro-oncology    [] Follow up pending CSF results including flow cytometry and cytopathology  [] rescue medications: 1) Ativan 1mg 1x for GTC > 3 min, 2) Vimpat 200mg 1x  [] neuro checks and vitals q4h  [] fall/seizure precautions    # Pulmonary nodules found on CT CAP - possible lymph nodes  [] few, small, up to 5mm   [] f/u CT in 3-6 months (4/23-7/23) with pulmonology (# [phone])  [] pt was informed of nodules/lymph nodes. he reports hx of working at [employer] when they collapsed, hx of asthma    # R Adrenal nodule 1.2 cm  [] pt was informed  [] follow up with primary care    #asthma  [] c/w home albuterol 90 2x puffs q6h PRN for SOB/wheezing  [] c/w Breo Ellipta 200-25 daily    #sinusitis  [] c/w home Augmentin 500mg BID for 10 days (1/21-1/30)  [] c/w home montelukast 10mg daily  [] c/w home Flonase 2 sprays BID    CORE MEASURES:        AED levels [] Sent [] Pending [] Resulted     LFTs [] normal [] elevated      Plan and education provided to [x] patient []family at bedside [] awaiting for family     Seizure Semiology  [] Tonic clonic  [] Clonic  [] Tonic  [] Unresponsive  [] Focal with impaired awareness  [] Focal without impaired awareness    Obtain screening lower extremity venous ultrasound in patients who meet 1 or more of the following criteria as patient is high risk for DVT/PE on admission:   [] History of DVT/PE  [] Hypercoagulable states (Factor V Leiden, Cancer, OCP, etc. )  [] Prolonged immobility (hemiplegia/hemiparesis/post operative or any other extended immobilization)  [] Transferred from outside facility (Rehab or Long term care)   76 YO M with Hx asthma and sinusitis presented to EMU due to abnormal outpatient EEG. Outpatient EEG showed L temporal lobe seizures and MRI showed L amygdala lesion (neoplastic vs inflammatory vs infectious). EEG on 1/28 revealed at least 6 electrographic L temporal lobe seizures, presented as oral automatism, cursing, and tachycardia. vEEG report 1/30 pending, no clear seizures on preliminary discussion.    Impression: few months of intermittent lightheaded episodes, found to have L temporal focal seizures. Etiology likely 2/2 a lesion in the left medial temporal/amygdalar region (neoplastic vs inflammatory vs infectious). Seizure frequency responding to addition of Vimpat better than initiation of Keppra. Attempting to switch to Vimpat Monotherapy.     Plan:  [] Continue VEEG  [] Discharge today   [] MRI spectroscopy for suspected nonenhancing brain mass  [] Increase Vimpat to 200mg PO BID, additional 50mg PO x 1 dose now  [] Decrease Keppra to 750mg PO BID  [] Dr Davidson to evaluate patient for potential biopsy- Dr Sweet contacted Dr Davidson directly, and neuro-oncology    [] Follow up pending CSF results including flow cytometry and cytopathology  [] rescue medications: 1) Ativan 1mg 1x for GTC > 3 min, 2) Vimpat 200mg 1x  [] neuro checks and vitals q4h  [] fall/seizure precautions    # Pulmonary nodules found on CT CAP - possible lymph nodes  [] few, small, up to 5mm   [] f/u CT in 3-6 months (4/23-7/23) with pulmonology (# [phone])  [] pt was informed of nodules/lymph nodes. he reports hx of working at [employer] when they collapsed, hx of asthma    # R Adrenal nodule 1.2 cm  [] pt was informed  [] follow up with primary care    #asthma  [] c/w home albuterol 90 2x puffs q6h PRN for SOB/wheezing  [] c/w Breo Ellipta 200-25 daily    #sinusitis  [] c/w home Augmentin 500mg BID for 10 days (1/21-1/30)  [] c/w home montelukast 10mg daily  [] c/w home Flonase 2 sprays BID    CORE MEASURES:        AED levels [] Sent [] Pending [] Resulted     LFTs [] normal [] elevated      Plan and education provided to [x] patient []family at bedside [] awaiting for family     Seizure Semiology  [] Tonic clonic  [] Clonic  [] Tonic  [] Unresponsive  [] Focal with impaired awareness  [] Focal without impaired awareness    Obtain screening lower extremity venous ultrasound in patients who meet 1 or more of the following criteria as patient is high risk for DVT/PE on admission:   [] History of DVT/PE  [] Hypercoagulable states (Factor V Leiden, Cancer, OCP, etc. )  [] Prolonged immobility (hemiplegia/hemiparesis/post operative or any other extended immobilization)  [] Transferred from outside facility (Rehab or Long term care)

## 2023-02-02 ENCOUNTER — NON-APPOINTMENT (OUTPATIENT)
Age: 78
End: 2023-02-02

## 2023-02-10 ENCOUNTER — NON-APPOINTMENT (OUTPATIENT)
Age: 78
End: 2023-02-10

## 2023-02-10 ENCOUNTER — APPOINTMENT (OUTPATIENT)
Dept: NEUROSURGERY | Facility: CLINIC | Age: 78
End: 2023-02-10
Payer: MEDICARE

## 2023-02-10 VITALS
BODY MASS INDEX: 26.12 KG/M2 | HEART RATE: 81 BPM | WEIGHT: 153 LBS | DIASTOLIC BLOOD PRESSURE: 63 MMHG | HEIGHT: 64 IN | SYSTOLIC BLOOD PRESSURE: 112 MMHG | OXYGEN SATURATION: 96 %

## 2023-02-10 LAB
AMPA-R AB CBA, CSF: NEGATIVE — SIGNIFICANT CHANGE UP
AMPHIPHYSIN AB TITR CSF: NEGATIVE TITER — SIGNIFICANT CHANGE UP
CV2 IGG TITR CSF: NEGATIVE TITER — SIGNIFICANT CHANGE UP
DPPX ANTIBODY IFA, CSF: NEGATIVE — SIGNIFICANT CHANGE UP
GABA-B-R AB CBA, CSF: NEGATIVE — SIGNIFICANT CHANGE UP
GAD65 AB CSF-SCNC: 0 NMOL/L — SIGNIFICANT CHANGE UP
GFAP IFA, CSF: NEGATIVE — SIGNIFICANT CHANGE UP
GLIAL NUC TYPE 1 AB TITR CSF: NEGATIVE TITER — SIGNIFICANT CHANGE UP
HU1 AB TITR CSF IF: NEGATIVE TITER — SIGNIFICANT CHANGE UP
HU2 AB TITR CSF IF: NEGATIVE TITER — SIGNIFICANT CHANGE UP
HU3 AB TITR CSF: NEGATIVE TITER — SIGNIFICANT CHANGE UP
IGLON5 IFA, CSF: NEGATIVE — SIGNIFICANT CHANGE UP
IMMUNOLOGIST REVIEW: SIGNIFICANT CHANGE UP
LGI1-IGG CBA, CSF: NEGATIVE — SIGNIFICANT CHANGE UP
MGLUR1 AB IFA, CSF: NEGATIVE — SIGNIFICANT CHANGE UP
NIF IFA, CSF: NEGATIVE — SIGNIFICANT CHANGE UP
NMDA-R AB CBA, CSF: NEGATIVE — SIGNIFICANT CHANGE UP
PCA-TR AB TITR CSF: NEGATIVE TITER — SIGNIFICANT CHANGE UP
PURKINJE CELL CYTOPLASMIC AB TYPE 2: NEGATIVE TITER — SIGNIFICANT CHANGE UP
PURKINJE CELLS AB TITR CSF IF: NEGATIVE TITER — SIGNIFICANT CHANGE UP
REFLEX ADDED: SIGNIFICANT CHANGE UP

## 2023-02-10 PROCEDURE — 99204 OFFICE O/P NEW MOD 45 MIN: CPT

## 2023-02-10 NOTE — REASON FOR VISIT
[New Patient Visit] : a new patient visit [Referred By: _________] : Patient was referred by MARISSA [Spouse] : spouse

## 2023-02-10 NOTE — PHYSICAL EXAM
[General Appearance - Alert] : alert [General Appearance - In No Acute Distress] : in no acute distress [General Appearance - Well Nourished] : well nourished [General Appearance - Well Developed] : well developed [Oriented To Time, Place, And Person] : oriented to person, place, and time [Impaired Insight] : insight and judgment were intact [Affect] : the affect was normal [Mood] : the mood was normal [Motor Tone] : muscle tone was normal in all four extremities [Motor Strength] : muscle strength was normal in all four extremities [Involuntary Movements] : no involuntary movements were seen [No Muscle Atrophy] : normal bulk in all four extremities [Balance] : balance was intact [Sclera] : the sclera and conjunctiva were normal [PERRL With Normal Accommodation] : pupils were equal in size, round, reactive to light, with normal accommodation [Outer Ear] : the ears and nose were normal in appearance [Hearing Threshold Finger Rub Not Ward] : hearing was normal [Neck Appearance] : the appearance of the neck was normal [Neck Cervical Mass (___cm)] : no neck mass was observed [] : no respiratory distress [Exaggerated Use Of Accessory Muscles For Inspiration] : no accessory muscle use [Heart Rate And Rhythm] : heart rate was normal and rhythm regular [Edema] : there was no peripheral edema [No Spinal Tenderness] : no spinal tenderness [Abnormal Walk] : normal gait [Skin Color & Pigmentation] : normal skin color and pigmentation

## 2023-02-13 RX ORDER — LEVETIRACETAM 1000 MG/1
TABLET, FILM COATED ORAL
Refills: 0 | Status: ACTIVE | COMMUNITY

## 2023-02-25 LAB
CULTURE RESULTS: SIGNIFICANT CHANGE UP
SPECIMEN SOURCE: SIGNIFICANT CHANGE UP

## 2023-03-02 NOTE — PHYSICAL EXAM
Patient [No Acute Distress] : no acute distress [Normal Oropharynx] : normal oropharynx [Normal Appearance] : normal appearance [No Neck Mass] : no neck mass [Normal Rate/Rhythm] : normal rate/rhythm [Normal S1, S2] : normal s1, s2 [No Murmurs] : no murmurs [No Resp Distress] : no resp distress [Wheeze] : wheeze [No Abnormalities] : no abnormalities [Benign] : benign [Normal Gait] : normal gait [No Clubbing] : no clubbing [No Cyanosis] : no cyanosis [No Edema] : no edema [FROM] : FROM [Normal Color/ Pigmentation] : normal color/ pigmentation [No Focal Deficits] : no focal deficits [Oriented x3] : oriented x3 [Normal Affect] : normal affect [TextBox_68] : crackles both bases

## 2023-03-03 NOTE — ASSESSMENT
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

## 2023-03-03 NOTE — HISTORY OF PRESENT ILLNESS
[de-identified] : The patient began to have seizure in October 2022 at age 77. \par \par Seizure Description :\par Aura: Dizziness / lightheadedness\par Description: Oral automatisms with unintelligible speech > frowning / grimacing > profanities, his awareness remains preserved throughout. Remains amnestic to these events. \par Events are preceded by tachycardia 10-15 sec prior to clinical onset\par \par AEEG concerns for risk of focal onset seizures from the left temporal region. MRI brain showed left amygdala lesion (neoplasm vs inflammatory/infection).  He then was admitted to EMU: multiple focal electrographic seizures, at times seen with subtle left frontotemporal electrographic changes, tachycardia, clinically characterized by oral automatisms, grimacing, cursing and hyperventilation. Awareness is preserved. Rare left anterior temporal sharp waves discharges, F7/T7 maximal and Intermittent left temporal theta and polymorphic delta activity.\par \par His case was discussed at [facility] [place] [facility] and it was recommended to monitor the lesion and seizures at this time. If seizure became intractable, he will need to have a biopsy. \par \par \par \par

## 2023-04-07 ENCOUNTER — RESULT REVIEW (OUTPATIENT)
Age: 78
End: 2023-04-07

## 2023-04-07 ENCOUNTER — APPOINTMENT (OUTPATIENT)
Dept: MRI IMAGING | Facility: CLINIC | Age: 78
End: 2023-04-07
Payer: MEDICARE

## 2023-04-07 ENCOUNTER — OUTPATIENT (OUTPATIENT)
Dept: OUTPATIENT SERVICES | Facility: HOSPITAL | Age: 78
LOS: 1 days | End: 2023-04-07
Payer: MEDICARE

## 2023-04-07 ENCOUNTER — APPOINTMENT (OUTPATIENT)
Dept: NEUROLOGY | Facility: CLINIC | Age: 78
End: 2023-04-07
Payer: MEDICARE

## 2023-04-07 VITALS
BODY MASS INDEX: 25.1 KG/M2 | HEART RATE: 65 BPM | HEIGHT: 64 IN | DIASTOLIC BLOOD PRESSURE: 73 MMHG | SYSTOLIC BLOOD PRESSURE: 158 MMHG | WEIGHT: 147 LBS

## 2023-04-07 DIAGNOSIS — G93.9 DISORDER OF BRAIN, UNSPECIFIED: ICD-10-CM

## 2023-04-07 DIAGNOSIS — Z98.890 OTHER SPECIFIED POSTPROCEDURAL STATES: Chronic | ICD-10-CM

## 2023-04-07 DIAGNOSIS — G40.109 LOCALIZATION-RELATED (FOCAL) (PARTIAL) SYMPTOMATIC EPILEPSY AND EPILEPTIC SYNDROMES WITH SIMPLE PARTIAL SEIZURES, NOT INTRACTABLE, WITHOUT STATUS EPILEPTICUS: ICD-10-CM

## 2023-04-07 PROCEDURE — 70553 MRI BRAIN STEM W/O & W/DYE: CPT | Mod: MH

## 2023-04-07 PROCEDURE — 99214 OFFICE O/P EST MOD 30 MIN: CPT

## 2023-04-07 PROCEDURE — A9585: CPT

## 2023-04-07 PROCEDURE — 70553 MRI BRAIN STEM W/O & W/DYE: CPT | Mod: 26,MH

## 2023-04-11 ENCOUNTER — NON-APPOINTMENT (OUTPATIENT)
Age: 78
End: 2023-04-11

## 2023-04-14 NOTE — PHYSICAL EXAM
[Neck Appearance] : the appearance of the neck was normal [Skin Turgor] : normal skin turgor [FreeTextEntry1] : Gen: NAD, pleasant \par Neuro: \par Alert, oriented, recalls past and recent events, fund of knowledge appropriate \par CN: EOMI, no nystagmus, face symmetrical, no dysarthria\par Motors: Normal bulk and tone, power 5/5 all throughout\par Gait: Slightly antalgic but stable

## 2023-04-14 NOTE — DATA REVIEWED
[de-identified] : MRI revealed a hyperintense flair signal in the left amygdala, hippocampus.  [de-identified] : Eight focal electrographic seizures, while mostly subclinical at times 2.5 Hz evolving rhythmic delta is seen maximally over the left temporal (F7), and frontocentral chains that remains confined to these chains. Ictal electrographic pattern is typically preceded by the tachycardia 10-15 seconds prior. Clinically there is stereotypical semiology of arousal (if asleep), followed by +/- oral automatisms, grimacing / frowning, profanities, and hyperventilation. Awareness is preserved patient recognizes the seizure and triggers the event button. He can effectively communicate with bedside staff. He reports being amnestic to these seizures. Duration 60-90 seconds. No slowing observed at offset. \par  [de-identified] : AIE panel negative \par

## 2023-04-14 NOTE — ASSESSMENT
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 Had abundant left temporal seizures during his emu stay, a multidisciplinary meeting was held and decision was made to monitor the lesion and biopsy as appropriate. Fortunately seizures are well controlled and remains seizure free since discharge. \par \par - WIll decrease Keppra 750 mg BID to 500 mg BID, had low efficacy on seizure control during his emu stay\par - Continue with Vimpat 200 mg BID \par - Driving restrictions 1 year as per [facility] law, to be re-discussed at 6 months.\par - Continue to follow-up with Dr. Davidson and lesion surveillance \par - Seizure precautions \par \par \par I have spent 30 minutes or longer reviewing patient data or discussing with the patient  the cause of seizures or seizure-like events and comorbid conditions, assessing the risk of recurrence, educating the patient or family to recognize seizures, discussing possible treatment options for seizures and comorbid conditions and documenting encounter and plan. More than 50% of time spent counseling and educating patient about epilepsy specific safety issues including AED side effects and interactions, alcohol consumption, sleep deprivation, risks and driving privileges associated with the [place] Guidelines, death related to seizures/SUDEP, seizure 1st aid and risks. Patient is educated on seizure precautions, including no driving, no operating machinery, no swimming or bathing, no climbing heights, or engage in any risky activities during which a seizure could cause further injury to pt or others. Greater than 50% of the encounter time was spent on counseling and coordination of care for reviewing records in [employer], discussion with patient regarding plan.

## 2023-04-14 NOTE — HISTORY OF PRESENT ILLNESS
[FreeTextEntry1] : *** 4/7/23 **** \par No seizures, side effects since discharge spouse concurs. Seen by Dr. Davidson who requested a follow-up MRI, procedure performed this morning no report available at this time but to our eyes lesion appears stable. Mr. Albarran nquired about driving restrictions, we conveyed the [facility] regulations restricting driving for 12 months.\par \par HPI \par 77 year-old right handed man initially seen by Dr. Thakkar for stereotypical events characterized as appearing strange, transient amnesia that was occurring multiple times a day. Abundant typical events captured with left temporal evolution, clinically manifesting as profanities, and tachycardia terminated with  & Vimpat 200 mg BID. 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 Was seen by Dr. Helm, and later followed up by Dr. Davidson and case was discussed in [facility] conference. Overall, decision was made to observe the lesion and decide on biopsy given his age and comorbidities. \par

## 2023-06-14 PROBLEM — J32.9 CHRONIC SINUSITIS, UNSPECIFIED: Chronic | Status: ACTIVE | Noted: 2023-01-24

## 2023-06-14 PROBLEM — J45.909 UNSPECIFIED ASTHMA, UNCOMPLICATED: Chronic | Status: ACTIVE | Noted: 2023-01-24

## 2023-06-19 ENCOUNTER — APPOINTMENT (OUTPATIENT)
Dept: PULMONOLOGY | Facility: CLINIC | Age: 78
End: 2023-06-19
Payer: MEDICARE

## 2023-06-19 VITALS
DIASTOLIC BLOOD PRESSURE: 70 MMHG | WEIGHT: 152 LBS | HEIGHT: 64 IN | SYSTOLIC BLOOD PRESSURE: 120 MMHG | HEART RATE: 85 BPM | TEMPERATURE: 98 F | OXYGEN SATURATION: 95 % | RESPIRATION RATE: 16 BRPM | BODY MASS INDEX: 25.95 KG/M2

## 2023-06-19 DIAGNOSIS — J45.909 UNSPECIFIED ASTHMA, UNCOMPLICATED: ICD-10-CM

## 2023-06-19 DIAGNOSIS — J31.0 CHRONIC RHINITIS: ICD-10-CM

## 2023-06-19 PROCEDURE — 99214 OFFICE O/P EST MOD 30 MIN: CPT

## 2023-06-19 NOTE — DISCUSSION/SUMMARY
[FreeTextEntry1] : 76 yo male with history of OAD at baseline on Breo with rare use of albuterol.  Treatment adjustment will depend on symptomatic needs.  PFT will be performed in the near future.  He is to follow-up with his PMD as before.

## 2023-06-19 NOTE — HISTORY OF PRESENT ILLNESS
[Never] : never [TextBox_4] : 77-year-old male with history of OAD, retired  with [employer] exposure, presents for follow-up.  Patient feels "fine" on Breo daily with rare albuterol use.  Patient was last given oral steroids about 1 month ago by his PMD.  Presently denies fever chills chest pain cough or shortness of breath.  Since last evaluation the patient had seizure in January of this year presently on antiepileptic meds. [TextBox_29] : Denies snoring, daytime somnolence, apneic episodes, AM headaches

## 2023-06-19 NOTE — REVIEW OF SYSTEMS
[Seizures] : seizures [Negative] : Endocrine [Nasal Congestion] : no nasal congestion [Postnasal Drip] : no postnasal drip [Cough] : no cough [Sputum] : no sputum [Dyspnea] : no dyspnea [Wheezing] : no wheezing

## 2023-06-28 ENCOUNTER — APPOINTMENT (OUTPATIENT)
Dept: PULMONOLOGY | Facility: CLINIC | Age: 78
End: 2023-06-28
Payer: MEDICARE

## 2023-06-28 VITALS
WEIGHT: 152 LBS | HEIGHT: 64 IN | RESPIRATION RATE: 16 BRPM | HEART RATE: 100 BPM | TEMPERATURE: 98.6 F | OXYGEN SATURATION: 96 % | SYSTOLIC BLOOD PRESSURE: 137 MMHG | DIASTOLIC BLOOD PRESSURE: 78 MMHG | BODY MASS INDEX: 25.95 KG/M2

## 2023-06-28 DIAGNOSIS — R06.2 WHEEZING: ICD-10-CM

## 2023-06-28 DIAGNOSIS — J44.9 CHRONIC OBSTRUCTIVE PULMONARY DISEASE, UNSPECIFIED: ICD-10-CM

## 2023-06-28 DIAGNOSIS — R05.9 COUGH, UNSPECIFIED: ICD-10-CM

## 2023-06-28 PROCEDURE — 94060 EVALUATION OF WHEEZING: CPT

## 2023-06-28 PROCEDURE — 94727 GAS DIL/WSHOT DETER LNG VOL: CPT

## 2023-06-28 PROCEDURE — 99214 OFFICE O/P EST MOD 30 MIN: CPT | Mod: 25

## 2023-06-28 PROCEDURE — 94729 DIFFUSING CAPACITY: CPT

## 2023-06-28 NOTE — DISCUSSION/SUMMARY
[FreeTextEntry1] : 77-year-old male with history of COPD, at baseline on Breo and albuterol.  Adjust will depend on symptomatic needs.  I reviewed the PFT results with the patient he is to follow-up with his PMD as before.

## 2023-06-28 NOTE — HISTORY OF PRESENT ILLNESS
[Never] : never [TextBox_4] : 77-year-old male with history of OAD presents for follow-up and PFT.  Patient continues to feel "okay" on daily Breo alone having last used albuterol five weeks ago.  He denies cough, chest pain or hemoptysis. [TextBox_29] : Denies snoring, daytime somnolence, apneic episodes, AM headaches

## 2023-06-28 NOTE — PROCEDURE
[FreeTextEntry1] : PFT results: Moderate obstructive airways disease with a significant bronchodilator response.

## 2023-07-08 ENCOUNTER — INPATIENT (INPATIENT)
Facility: HOSPITAL | Age: 78
LOS: 2 days | Discharge: HOME | DRG: 72 | End: 2023-07-11
Attending: PSYCHIATRY & NEUROLOGY | Admitting: PSYCHIATRY & NEUROLOGY
Payer: MEDICARE

## 2023-07-08 VITALS
HEART RATE: 83 BPM | DIASTOLIC BLOOD PRESSURE: 73 MMHG | OXYGEN SATURATION: 94 % | WEIGHT: 145.06 LBS | HEIGHT: 66 IN | SYSTOLIC BLOOD PRESSURE: 137 MMHG | TEMPERATURE: 101 F | RESPIRATION RATE: 20 BRPM

## 2023-07-08 DIAGNOSIS — Z98.890 OTHER SPECIFIED POSTPROCEDURAL STATES: Chronic | ICD-10-CM

## 2023-07-08 DIAGNOSIS — G93.40 ENCEPHALOPATHY, UNSPECIFIED: ICD-10-CM

## 2023-07-08 LAB
ALBUMIN SERPL ELPH-MCNC: 4 G/DL — SIGNIFICANT CHANGE UP (ref 3.3–5)
ALP SERPL-CCNC: 100 U/L — SIGNIFICANT CHANGE UP (ref 40–120)
ALT FLD-CCNC: 14 U/L — SIGNIFICANT CHANGE UP (ref 10–45)
ANION GAP SERPL CALC-SCNC: 14 MMOL/L — SIGNIFICANT CHANGE UP (ref 5–17)
APAP SERPL-MCNC: <15 UG/ML — SIGNIFICANT CHANGE UP (ref 10–30)
APPEARANCE UR: CLEAR — SIGNIFICANT CHANGE UP
APTT BLD: 31.8 SEC — SIGNIFICANT CHANGE UP (ref 27.5–35.5)
AST SERPL-CCNC: 21 U/L — SIGNIFICANT CHANGE UP (ref 10–40)
BACTERIA # UR AUTO: NEGATIVE — SIGNIFICANT CHANGE UP
BASE EXCESS BLDV CALC-SCNC: -1.3 MMOL/L — SIGNIFICANT CHANGE UP (ref -2–3)
BASOPHILS # BLD AUTO: 0.03 K/UL — SIGNIFICANT CHANGE UP (ref 0–0.2)
BASOPHILS NFR BLD AUTO: 0.4 % — SIGNIFICANT CHANGE UP (ref 0–2)
BILIRUB SERPL-MCNC: 0.5 MG/DL — SIGNIFICANT CHANGE UP (ref 0.2–1.2)
BILIRUB UR-MCNC: NEGATIVE — SIGNIFICANT CHANGE UP
BUN SERPL-MCNC: 19 MG/DL — SIGNIFICANT CHANGE UP (ref 7–23)
CA-I SERPL-SCNC: 1.14 MMOL/L — LOW (ref 1.15–1.33)
CALCIUM SERPL-MCNC: 8.9 MG/DL — SIGNIFICANT CHANGE UP (ref 8.4–10.5)
CHLORIDE BLDV-SCNC: 96 MMOL/L — SIGNIFICANT CHANGE UP (ref 96–108)
CHLORIDE SERPL-SCNC: 95 MMOL/L — LOW (ref 96–108)
CO2 BLDV-SCNC: 25 MMOL/L — SIGNIFICANT CHANGE UP (ref 22–26)
CO2 SERPL-SCNC: 21 MMOL/L — LOW (ref 22–31)
COLOR SPEC: YELLOW — SIGNIFICANT CHANGE UP
CREAT SERPL-MCNC: 1.17 MG/DL — SIGNIFICANT CHANGE UP (ref 0.5–1.3)
DIFF PNL FLD: NEGATIVE — SIGNIFICANT CHANGE UP
EGFR: 64 ML/MIN/1.73M2 — SIGNIFICANT CHANGE UP
EOSINOPHIL # BLD AUTO: 0.02 K/UL — SIGNIFICANT CHANGE UP (ref 0–0.5)
EOSINOPHIL NFR BLD AUTO: 0.3 % — SIGNIFICANT CHANGE UP (ref 0–6)
EPI CELLS # UR: 1 /HPF — SIGNIFICANT CHANGE UP
ETHANOL SERPL-MCNC: <10 MG/DL — SIGNIFICANT CHANGE UP (ref 0–10)
GAS PNL BLDV: 127 MMOL/L — LOW (ref 136–145)
GAS PNL BLDV: SIGNIFICANT CHANGE UP
GAS PNL BLDV: SIGNIFICANT CHANGE UP
GLUCOSE BLDV-MCNC: 110 MG/DL — HIGH (ref 70–99)
GLUCOSE SERPL-MCNC: 110 MG/DL — HIGH (ref 70–99)
GLUCOSE UR QL: NEGATIVE — SIGNIFICANT CHANGE UP
HCO3 BLDV-SCNC: 24 MMOL/L — SIGNIFICANT CHANGE UP (ref 22–29)
HCT VFR BLD CALC: 39.2 % — SIGNIFICANT CHANGE UP (ref 39–50)
HCT VFR BLDA CALC: 38 % — LOW (ref 39–51)
HGB BLD CALC-MCNC: 12.8 G/DL — SIGNIFICANT CHANGE UP (ref 12.6–17.4)
HGB BLD-MCNC: 12.6 G/DL — LOW (ref 13–17)
HYALINE CASTS # UR AUTO: 2 /LPF — SIGNIFICANT CHANGE UP (ref 0–2)
IMM GRANULOCYTES NFR BLD AUTO: 0.4 % — SIGNIFICANT CHANGE UP (ref 0–0.9)
INR BLD: 1.29 RATIO — HIGH (ref 0.88–1.16)
KETONES UR-MCNC: ABNORMAL
LACTATE BLDV-MCNC: 1.7 MMOL/L — SIGNIFICANT CHANGE UP (ref 0.5–2)
LEUKOCYTE ESTERASE UR-ACNC: NEGATIVE — SIGNIFICANT CHANGE UP
LYMPHOCYTES # BLD AUTO: 0.51 K/UL — LOW (ref 1–3.3)
LYMPHOCYTES # BLD AUTO: 7.6 % — LOW (ref 13–44)
MCHC RBC-ENTMCNC: 24.9 PG — LOW (ref 27–34)
MCHC RBC-ENTMCNC: 32.1 GM/DL — SIGNIFICANT CHANGE UP (ref 32–36)
MCV RBC AUTO: 77.5 FL — LOW (ref 80–100)
MONOCYTES # BLD AUTO: 0.67 K/UL — SIGNIFICANT CHANGE UP (ref 0–0.9)
MONOCYTES NFR BLD AUTO: 10 % — SIGNIFICANT CHANGE UP (ref 2–14)
NEUTROPHILS # BLD AUTO: 5.47 K/UL — SIGNIFICANT CHANGE UP (ref 1.8–7.4)
NEUTROPHILS NFR BLD AUTO: 81.3 % — HIGH (ref 43–77)
NITRITE UR-MCNC: NEGATIVE — SIGNIFICANT CHANGE UP
NRBC # BLD: 0 /100 WBCS — SIGNIFICANT CHANGE UP (ref 0–0)
PCO2 BLDV: 40 MMHG — LOW (ref 42–55)
PH BLDV: 7.38 — SIGNIFICANT CHANGE UP (ref 7.32–7.43)
PH UR: 6 — SIGNIFICANT CHANGE UP (ref 5–8)
PLATELET # BLD AUTO: 224 K/UL — SIGNIFICANT CHANGE UP (ref 150–400)
PO2 BLDV: 29 MMHG — SIGNIFICANT CHANGE UP (ref 25–45)
POTASSIUM BLDV-SCNC: 4.3 MMOL/L — SIGNIFICANT CHANGE UP (ref 3.5–5.1)
POTASSIUM SERPL-MCNC: 4.4 MMOL/L — SIGNIFICANT CHANGE UP (ref 3.5–5.3)
POTASSIUM SERPL-SCNC: 4.4 MMOL/L — SIGNIFICANT CHANGE UP (ref 3.5–5.3)
PROT SERPL-MCNC: 7.3 G/DL — SIGNIFICANT CHANGE UP (ref 6–8.3)
PROT UR-MCNC: ABNORMAL
PROTHROM AB SERPL-ACNC: 14.9 SEC — HIGH (ref 10.5–13.4)
RAPID RVP RESULT: SIGNIFICANT CHANGE UP
RBC # BLD: 5.06 M/UL — SIGNIFICANT CHANGE UP (ref 4.2–5.8)
RBC # FLD: 16.9 % — HIGH (ref 10.3–14.5)
RBC CASTS # UR COMP ASSIST: 2 /HPF — SIGNIFICANT CHANGE UP (ref 0–4)
SALICYLATES SERPL-MCNC: <2 MG/DL — LOW (ref 15–30)
SAO2 % BLDV: 40 % — LOW (ref 67–88)
SARS-COV-2 RNA SPEC QL NAA+PROBE: SIGNIFICANT CHANGE UP
SODIUM SERPL-SCNC: 130 MMOL/L — LOW (ref 135–145)
SP GR SPEC: 1.03 — HIGH (ref 1.01–1.02)
UROBILINOGEN FLD QL: NEGATIVE — SIGNIFICANT CHANGE UP
WBC # BLD: 6.73 K/UL — SIGNIFICANT CHANGE UP (ref 3.8–10.5)
WBC # FLD AUTO: 6.73 K/UL — SIGNIFICANT CHANGE UP (ref 3.8–10.5)
WBC UR QL: 3 /HPF — SIGNIFICANT CHANGE UP (ref 0–5)

## 2023-07-08 PROCEDURE — 70450 CT HEAD/BRAIN W/O DYE: CPT | Mod: 26,MA

## 2023-07-08 PROCEDURE — 71045 X-RAY EXAM CHEST 1 VIEW: CPT | Mod: 26

## 2023-07-08 PROCEDURE — 99285 EMERGENCY DEPT VISIT HI MDM: CPT

## 2023-07-08 RX ORDER — BUDESONIDE AND FORMOTEROL FUMARATE DIHYDRATE 160; 4.5 UG/1; UG/1
2 AEROSOL RESPIRATORY (INHALATION)
Refills: 0 | Status: DISCONTINUED | OUTPATIENT
Start: 2023-07-08 | End: 2023-07-11

## 2023-07-08 RX ORDER — ENOXAPARIN SODIUM 100 MG/ML
40 INJECTION SUBCUTANEOUS EVERY 24 HOURS
Refills: 0 | Status: DISCONTINUED | OUTPATIENT
Start: 2023-07-08 | End: 2023-07-11

## 2023-07-08 RX ORDER — MONTELUKAST 4 MG/1
10 TABLET, CHEWABLE ORAL AT BEDTIME
Refills: 0 | Status: DISCONTINUED | OUTPATIENT
Start: 2023-07-08 | End: 2023-07-11

## 2023-07-08 RX ORDER — SODIUM CHLORIDE 9 MG/ML
500 INJECTION INTRAMUSCULAR; INTRAVENOUS; SUBCUTANEOUS ONCE
Refills: 0 | Status: COMPLETED | OUTPATIENT
Start: 2023-07-08 | End: 2023-07-08

## 2023-07-08 RX ORDER — LEVETIRACETAM 250 MG/1
500 TABLET, FILM COATED ORAL EVERY 12 HOURS
Refills: 0 | Status: DISCONTINUED | OUTPATIENT
Start: 2023-07-08 | End: 2023-07-09

## 2023-07-08 RX ORDER — LACOSAMIDE 50 MG/1
200 TABLET ORAL EVERY 12 HOURS
Refills: 0 | Status: DISCONTINUED | OUTPATIENT
Start: 2023-07-08 | End: 2023-07-11

## 2023-07-08 RX ORDER — ALBUTEROL 90 UG/1
2 AEROSOL, METERED ORAL EVERY 6 HOURS
Refills: 0 | Status: DISCONTINUED | OUTPATIENT
Start: 2023-07-08 | End: 2023-07-11

## 2023-07-08 RX ADMIN — SODIUM CHLORIDE 500 MILLILITER(S): 9 INJECTION INTRAMUSCULAR; INTRAVENOUS; SUBCUTANEOUS at 17:36

## 2023-07-08 RX ADMIN — LACOSAMIDE 200 MILLIGRAM(S): 50 TABLET ORAL at 23:00

## 2023-07-08 RX ADMIN — SODIUM CHLORIDE 500 MILLILITER(S): 9 INJECTION INTRAMUSCULAR; INTRAVENOUS; SUBCUTANEOUS at 18:35

## 2023-07-08 RX ADMIN — LEVETIRACETAM 500 MILLIGRAM(S): 250 TABLET, FILM COATED ORAL at 23:00

## 2023-07-08 NOTE — ED ADULT NURSE NOTE - NSFALLUNIVINTERV_ED_ALL_ED
Bed/Stretcher in lowest position, wheels locked, appropriate side rails in place/Call bell, personal items and telephone in reach/Instruct patient to call for assistance before getting out of bed/chair/stretcher/Non-slip footwear applied when patient is off stretcher/[place] to call system/Physically safe environment - no spills, clutter or unnecessary equipment/Purposeful proactive rounding/Room/bathroom lighting operational, light cord in reach

## 2023-07-08 NOTE — H&P ADULT - NSHPREVIEWOFSYSTEMS_GEN_ALL_CORE
Review of Systems:    CONSTITUTIONAL: No fevers or chills  EYES AND ENT: No visual changes or no throat pain   NECK: No pain or stiffness  RESPIRATORY: No hemoptysis or shortness of breath  CARDIOVASCULAR: No chest pain or palpitations  GASTROINTESTINAL: No melena or hematochezia  GENITOURINARY: No dysuria or hematuria  NEUROLOGICAL: +As stated in HPI above  SKIN: R elbow lesion  All other review of systems is negative unless indicated above.

## 2023-07-08 NOTE — H&P ADULT - NSCORESITESY/N_GEN_A_CORE_RD
Problem: Adult Inpatient Plan of Care  Goal: Plan of Care Review  Outcome: Ongoing (interventions implemented as appropriate)  POC reviewed with pt at 0500. Pt verbalized understanding. Questions and concerns addressed. No acute events overnight. Pt MRI completed. Pt progressing toward goals. Will continue to monitor. See flowsheets for full assessment and VS info          No

## 2023-07-08 NOTE — H&P ADULT - NSHPPHYSICALEXAM_GEN_ALL_CORE
Physical Examination:   General - NAD, R elbow lesion with erythema and intact sutures   Cardiovascular - Peripheral pulses palpable, no edema  Eyes - Fundoscopy not performed due to safety precautions in the setting of the COVID-19 pandemic    Neurologic Exam:  Mental status - Awake, Alert, Oriented to person, place, and time. Speech fluent, repetition and naming intact. Follows simple and complex commands. Good attention/concentration, recent and remote memory (including registration and 3 word recall), and fund of knowledge intact.    Cranial nerves - PERRL, VFF, EOMI, face sensation (V1-V3) intact b/l, facial strength intact without asymmetry b/l, hearing intact b/l, palate with symmetric elevation, trapezius OR sternocleidomastoid 5/5 strength b/l, tongue midline on protrusion with full lateral movement    Motor - Normal bulk and tone throughout. No pronator drift.  Strength testing            Deltoid      Biceps      Triceps     Wrist Extension    Wrist Flexion          R            5                 5               5                     5                              5                             5                     L             5                 5               5                     5                              5                             5                               Hip Flexion    Hip Extension    Knee Flexion    Knee Extension    Dorsiflexion    Plantar Flexion  R              5                           5                       5                           5                            5                          5  L              5                           5                        5                           5                            5                          5    Sensation - Light touch/temperature OR pain/vibration intact throughout    DTR's -             Biceps      Triceps     Brachioradialis      Patellar    Ankle    Toes/plantar response  R             2+             2+                  2+                       2+            2+                  Down  L              2+             2+                 2+                        2+           2+                   Down    Coordination - Finger to Nose intact b/l. No tremors appreciated    Gait and station - Deferred Physical Examination:   General - NAD, R elbow lesion with erythema and intact sutures   Cardiovascular - Peripheral pulses palpable, no edema  Eyes - Fundoscopy not performed due to safety precautions in the setting of the COVID-19 pandemic    Neurologic Exam:  Mental status - Awake, Alert, Oriented to person, place, and time. Speech fluent, repetition and naming intact. Follows simple and complex commands. Good attention/concentration, recent and remote memory (including registration and 3 word recall), and fund of knowledge intact.    Cranial nerves - PERRL, VFF, EOMI, face sensation (V1-V3) intact b/l, facial strength intact without asymmetry b/l, hearing intact b/l, palate with symmetric elevation, trapezius 5/5 strength b/l, tongue midline on protrusion with full lateral movement    Motor - Normal bulk and tone throughout. No pronator drift.  Strength testing            Deltoid      Biceps      Triceps     Wrist Extension    Wrist Flexion          R            5                 5               5                     5                              5                             5                     L             5                 5               5                     5                              5                             5                               Hip Flexion    Hip Extension    Knee Flexion    Knee Extension    Dorsiflexion    Plantar Flexion  R              5                           5                       5                           5                            5                          5  L              5                           5                        5                           5                            5                          5    Sensation - Light touch/temperature OR pain/vibration intact throughout    DTR's -             Biceps      Triceps     Brachioradialis      Patellar    Ankle    Toes/plantar response  R             2+             2+                  2+                       2+            2+                  Down  L              2+             2+                 2+                        2+           2+                   Down    Coordination - Finger to Nose intact b/l. No tremors appreciated    Gait and station - Deferred

## 2023-07-08 NOTE — H&P ADULT - NSICDXPASTSURGICALHX_GEN_ALL_CORE_FT
PAST SURGICAL HISTORY:  H/O knee surgery BILATERAL SCOPE    Status post left rotator cuff repair

## 2023-07-08 NOTE — ED PROVIDER NOTE - CRANIAL NERVE AND PUPILLARY EXAM
cranial nerves 2-12 intact cranial nerves 2-12 intact/extra-ocular movements intact/tongue is midline

## 2023-07-08 NOTE — H&P ADULT - ATTENDING COMMENTS
Seen 7/9/2022  1PM  Agree with housestaff exam, assessment, and plan unless otherwise stated. Patient evaluated and examined in my presence, case discussed with treatment team. As per protocol, I discussed available results of testing and plan of care with the patient/advocate, who agree to the plan, including anticipated benefits of the admission, studies, procedure. Note above reviewed, further edited for accuracy, and cosigned.  Face time for evaluation, education, and counseling was >50% of time spent on unit (minutes) x: 60    Aphasia, confusion x1 day, resolved in ER.  At baseline at this time, no weakness or aphasia on exam.  Known L temporal lesion, szs in 1/2023  Rec MRI brain w/ w/o contrast  EEG  increase in LEV to 750mg bid

## 2023-07-08 NOTE — H&P ADULT - HISTORY OF PRESENT ILLNESS
Neurology - Consult Note    -  Spectra: [phone] ([facility]), [phone] ([facility])  -    HPI: Patient VANESSA CASTRO is a 77y (1945) man with a PMHx of asthma, focal seizures, L amygdala/hippocampus  lesion (concern for low grade glioma per NSGY) who is brought in for altered mental status and word finding difficulty earlier today. Wife at bedside provides history as patient unable to remember the events. She reports patient was in usual state of health but this morning she noticed patient was slow to respond to questions and appeared confused. Patient was A&OX2 initially not to time) when brought to the ED and improved to A&OX3 upon neuro assessment. Denies any bowel or bladder incontinence, myalgias, tongue bite, dizziness, visual  changes, focal weakness or sensory deficits. Patient does report mild headache lasting 2-3 days. Patient is compliant with Keppra 500mg BID and lacosamide 200mg BID. Wife reports patient is now back to his baseline mental status. Patient's first seizure episode was in Oct 2022, He had episodes lasting a few seconds to minutes approx 4-5 times a day. Semiology described as facial twitching per family and staring spells. Patient was admitted in Jan 2023 for these episodes.  On EEG, there were abundant typical events captured with left temporal evolution, clinically manifesting as profanities, and tachycardia terminated with  & Vimpat 200 mg BID. 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 Per patient his last seizure episode was in Jan 2023. He was seen by Dr. Helm, followed by Dr. Davidson, discussed in [facility] conference, and follows with Dr. Sweet outpatient. Decision was made to observe the lesion and decide on biopsy given his age and comorbidities. Keppra was tapered to 500mg BID in April Patient denies any SOB, chest pain, dysuria, diarrhea, neck pain, fevers/chill, N/V. Patient has a lesion on R elbow concerning for infection and was recently treated with a course of bactrim and another unknown PO antibiotic, Denies any other new medications, denies sleep deprivation, alcohol or illicit drug use,    Neurology - Consult Note    -  Spectra: [phone] ([facility]), [phone] ([facility])  -    HPI: Patient VANESSA CASTRO is a 77y (1945) man with a PMHx of asthma, focal seizures on Keppra and Vimpat, L amygdala/hippocampus  lesion (concern for low grade glioma per NSGY) who is brought in for altered mental status and word finding difficulty earlier today. Wife at bedside provides history as patient unable to remember the events. She reports patient was in usual state of health but this morning she noticed patient was slow to respond to questions and appeared confused. Patient was A&OX2 initially not to time) when brought to the ED and improved to A&OX3 upon neuro assessment. Denies any bowel or bladder incontinence, myalgias, tongue bite, dizziness, visual  changes, focal weakness or sensory deficits. Patient does report mild headache lasting 2-3 days. Patient is compliant with Keppra 500mg BID and lacosamide 200mg BID. Wife reports patient is now back to his baseline mental status. Patient's first seizure episode was in Oct 2022, He had episodes lasting a few seconds to minutes approx 4-5 times a day. Semiology described as facial twitching per family and staring spells. Patient was admitted in Jan 2023 for these episodes.  On EEG, there were abundant typical events captured with left temporal evolution, clinically manifesting as profanities, and tachycardia terminated with  & Vimpat 200 mg BID. 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 Per patient his last seizure episode was in Jan 2023. He was seen by Dr. Helm, followed by Dr. Davidson, discussed in [facility] conference, and follows with Dr. Sweet outpatient. Decision was made to observe the lesion and decide on biopsy given his age and comorbidities. Keppra was tapered to 500mg BID in April Patient denies any SOB, chest pain, dysuria, diarrhea, neck pain, fevers/chill, N/V. Patient has a lesion on R elbow concerning for infection and was recently treated with a course of bactrim and another unknown PO antibiotic, Denies any other new medications, denies sleep deprivation, alcohol or illicit drug use,    Neurology - Consult Note    -  Spectra: [phone] ([facility]), [phone] ([facility])  -    HPI: Patient VANESSA CASTRO is a 77y (1945) man with a PMHx of asthma, focal seizures on Keppra and Vimpat, L amygdala/hippocampus  lesion (concern for low grade glioma per NSGY) who is brought in for altered mental status and word finding difficulty earlier today. Wife at bedside provides history as patient unable to remember the events. She reports patient was in usual state of health but this morning she noticed patient was slow to respond to questions and appeared confused. Patient was A&OX2 initially not to time) when brought to the ED and improved to A&OX3 upon neuro assessment. Denies any bowel or bladder incontinence, myalgias, tongue bite, dizziness, visual  changes, focal weakness or sensory deficits. Patient does report mild headache lasting 2-3 days. Patient is compliant with Keppra 500mg BID and lacosamide 200mg BID. Wife reports patient is now back to his baseline mental status. Patient's first seizure episode was in Oct 2022, He had episodes lasting a few seconds to minutes approx 4-5 times a day. Semiology described as facial twitching per family and staring spells. Patient was admitted in Jan 2023 for these episodes.  On EEG, there were abundant typical events captured with left temporal evolution, clinically manifesting as profanities, and tachycardia terminated with  & Vimpat 200 mg BID. 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 Per patient his last seizure episode was in Jan 2023. He was seen by Dr. Helm, followed by Dr. Davidson, discussed in [facility] conference, and follows with Dr. Sweet outpatient. Decision was made to observe the lesion and decide on biopsy given his age and comorbidities. Keppra was tapered to 500mg BID in April Patient denies any SOB, chest pain, dysuria, diarrhea, neck pain, fevers/chill, N/V. Patient has a lesion on R elbow concerning for infection and was recently treated with a course of bactrim and another unknown PO antibiotic, Denies any other new medications, denies sleep deprivation, alcohol or illicit drug use,

## 2023-07-08 NOTE — ED ADULT NURSE NOTE - NSFALLRISKASMT_ED_ALL_ED_DT
Discharge Plan[de-identified] Other/[facility])   Telemetry/Cardiac Monitoring Required?: Yes 08-Jul-2023 16:20

## 2023-07-08 NOTE — ED ADULT NURSE NOTE - OBJECTIVE STATEMENT
patient received alert & oriented x2 but noted with slow verbal response to questions & periods of confusion. Accompanied by sister-in-law who gave more information. Patient follows direction. GCS-15. Denies sob, dizziness, or pain. Seen & evaluated by Dr Galeana & Hunter SEYMOUR.

## 2023-07-08 NOTE — ED PROVIDER NOTE - SKIN RASH DESCRIPTION
diffuse non-blanching maculopapulo rash to chest, abdomen, and back; right elbow with area of erythema and 2 sutures in place

## 2023-07-08 NOTE — H&P ADULT - NSHPLABSRESULTS_GEN_ALL_CORE
.  LABS:                         12.6   6.73  )-----------( 224      ( 2023 16:31 )             39.2     07-08    130<L>  |  95<L>  |  19  ----------------------------<  110<H>  4.4   |  21<L>  |  1.17    Ca    8.9      2023 16:31    TPro  7.3  /  Alb  4.0  /  TBili  0.5  /  DBili  x   /  AST  21  /  ALT  14  /  AlkPhos  100  07-08    PT/INR - ( 2023 16:31 )   PT: 14.9 sec;   INR: 1.29 ratio         PTT - ( 2023 16:31 )  PTT:31.8 sec  Urinalysis Basic - ( 2023 18:23 )    Color: Yellow / Appearance: Clear / S.033 / pH: x  Gluc: x / Ketone: Moderate  / Bili: Negative / Urobili: Negative   Blood: x / Protein: 30 mg/dL / Nitrite: Negative   Leuk Esterase: Negative / RBC: 2 /hpf / WBC 3 /HPF   Sq Epi: x / Non Sq Epi: x / Bacteria: Negative        RADIOLOGY, EKG & ADDITIONAL TESTS: Reviewed.  CT head< from: CT Head No Cont (23 @ 18:42) >    IMPRESSION:    No acute intracranial hemorrhage, mass effect, or evidence of acute   vascular territorial infarction.  Extensive paranasal sinus mucosal thickening and opacification.

## 2023-07-08 NOTE — ED ADULT NURSE REASSESSMENT NOTE - NS ED NURSE REASSESS COMMENT FT1
patient given nightime medications as per MD order. No complaints at this time. Awaiting EMU bed placement. Safety measures maintained.

## 2023-07-08 NOTE — H&P ADULT - REASON FOR ADMISSION
Encephalopathy, concern for subclinical seizure Encephalopathy, concern for focal seizure/ NCSE Encephalopathy, concern for focal seizure

## 2023-07-08 NOTE — ED PROVIDER NOTE - CLINICAL SUMMARY MEDICAL DECISION MAKING FREE TEXT BOX
76 yo M, with hx of seizures, presents with episode of AMS PTA.  Concern for stroke/TIA, intracranial hemorrhage, encephalitis, sepsis.  Will order CBC, CMP, lactate, cultures, UA, chest xray, CT head to evaluate. 76 yo M, with hx of seizures, presents with episode of AMS PTA.  Concern for stroke/TIA, intracranial hemorrhage, encephalitis, sepsis, drug toxicity.  Will order CBC, CMP, lactate, cultures, UA, chest xray, CT head, alcohol, salicylate, acetaminophen to evaluate.

## 2023-07-08 NOTE — ED PROVIDER NOTE - ATTENDING CONTRIBUTION TO CARE
Attending MD Galeana:  I personally have seen and examined this patient. I have performed a substantive portion of the visit including all aspects of the medical decision making.  Resident note reviewed and agree on plan of care and except where noted.          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  History from patient's sister at the bedside.  Patient's sister states that the patient seems forgetful and mildly confused since this morning.  The patient himself is complaining of a mild headache which does appear to be new.  No neck pain or neck stiffness.  No cough no abdominal pain no urinary symptoms.  Of note, the patient is on Bactrim for an infection to the right posterior elbow.  He has sutures in place from a procedure that was done recently however he cannot specifically state when this was.  History is somewhat limited as patient is mildly confused.    Vital signs notable for oral temperature 100.6, oxygen saturation 94% otherwise nonactionable.  Patient is awake and alert in no apparent distress.  He is oriented x2, he was forgetful of the year and month.  Awake and alert. Symmetric eyebrow raise, symmetric eyelid closure. PERRL b/l, EOMI b/l, symmetric smile, tongue midline.  5/5  strength bilaterally, 5/5 elbow flexion bilaterally, 5/5 elbow extension bilaterally, 5/5 shoulder shrug b/l.  5/5 plantar and dorsiflexion b/l, 5/5 knee flexion and extension b/l, 5/5 hip flexion and extension b/l. Sensation intact and symmetric grossly to light touch throughout face and bilateral upper and lower extremities,  Finger to nose normal bilaterally.  Neck is supple and nontender.  Clear lungs no obvious murmur abdomen nontender nondistended.  Extremities warm and well-perfused.  Posterior right elbow with mild erythema present however no warmth tenderness or fluctuance.  2 nylon sutures in place.  No drainage.  Patient can flex and extend the elbow fully.    Patient presenting for evaluation of confusion for 1 day.  Found to be febrile 100.6.  Examination notable for mild forgetfulness and mild confusion but nonfocal neurologic exam.  Differential diagnosis includes metabolic or infectious encephalopathy.  Also consider worsening CNS lesion given history of such or IPH, will obtain CT head to rule this out.  Would also consider possible subclinical seizure activity given seizure history.  I considered meningoencephalitis but do not think that this is likely given patient's reassuring examination and no evidence of meningismus.      *The above represents an initial assessment/impression. Please refer to progress notes for potential changes in patient clinical course*

## 2023-07-08 NOTE — H&P ADULT - ASSESSMENT
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     Impression:  Concern for focal seizures in setting of known focal epilepsy and  L amygdala/hippocampus  lesion. Possibly precipated by infection, recent antibiotic use. Less likely due to medication nonadherence.  Less likely acute infarct or bleed given CT head findings    Plan:  [ ] Admit to EMU for further monitoring and evaluation  [ ] Continue home dose AED for now. Keppra 500mg BID and Vimpat 200mg BID  [ ] 24 hr vEEG, please press red button for any events similar to above description.  [] Check AED levels  [] Seizure, fall and aspiration precautions.  Avoid sleep deprivation.  [] Seizure rescue medications for focal seizure lasting >3 min which doesn't resolve and/or any GTC:  ---->1st line: 2mg ativan IVP. May repeat x 1 if doesn't break within 3 minutes. (Max dose 0.1 mg/kg)    [] If any seizure activity noted, please note: time, if upper/lower or focal onset symptoms (e.g. head turn, eye deviation, upper/lower tonic/clonic arm initially), vital sign derangements, airway protection, urinary or bowel incontinence, duration of seizure, tongue bite, and/or post-ictal time to return of baseline.   [] Check CK  [] Elective MRI brain w/ and w/o contrast for interval evaluation of known lesion  [] Given concern for seizure, advise patient to not drive, operate heavy machinery, avoid heights, pools, bathtubs, locked doors until cleared by further follow up outpatient by neurology.      Case discussed with Dr. 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 Possibly precipated by infection, recent antibiotic use. Less likely due to medication nonadherence.  Less likely acute infarct or bleed given CT head findings    Plan:  [ ] Admit to EMU for further monitoring and evaluation  [ ] Continue home dose AED for now. Keppra 500mg BID and Vimpat 200mg BID  [ ] 24 hr vEEG, please press red button for any events similar to above description.  [] Check AED levels  [] Seizure, fall and aspiration precautions.  Avoid sleep deprivation.  [] Seizure rescue medications for focal seizure lasting >3 min which doesn't resolve and/or any GTC:  ---->1st line: 2mg ativan IVP. May repeat x 1 if doesn't break within 3 minutes. (Max dose 0.1 mg/kg)    [] If any seizure activity noted, please note: time, if upper/lower or focal onset symptoms (e.g. head turn, eye deviation, upper/lower tonic/clonic arm initially), vital sign derangements, airway protection, urinary or bowel incontinence, duration of seizure, tongue bite, and/or post-ictal time to return of baseline.   [] Check CK  [] Elective MRI brain w/ and w/o contrast for interval evaluation of known lesion  [] Given concern for seizure, advise patient to not drive, operate heavy machinery, avoid heights, pools, bathtubs, locked doors until cleared by further follow up outpatient by neurology.      Case discussed with Dr. 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 Possibly precipitated by infection, recent antibiotic use. Less likely due to medication nonadherence.  Less likely acute infarct or bleed given CT head findings    Plan:  [ ] Admit to EMU for further monitoring and evaluation  [ ] Continue home dose AED for now. Keppra 500mg BID and Vimpat 200mg BID  [ ] 24 hr vEEG, please press red button for any events similar to above description.  [] Check AED levels  [] Seizure, fall and aspiration precautions.  Avoid sleep deprivation.  [] Seizure rescue medications for focal seizure lasting >3 min which doesn't resolve and/or any GTC:  ---->1st line: 2mg ativan IVP. May repeat x 1 if doesn't break within 3 minutes. (Max dose 0.1 mg/kg)    [] If any seizure activity noted, please note: time, if upper/lower or focal onset symptoms (e.g. head turn, eye deviation, upper/lower tonic/clonic arm initially), vital sign derangements, airway protection, urinary or bowel incontinence, duration of seizure, tongue bite, and/or post-ictal time to return of baseline.   [] Check CK  [] Elective MRI brain w/ and w/o contrast for interval evaluation of known lesion  [] Given concern for seizure, advise patient to not drive, operate heavy machinery, avoid heights, pools, bathtubs, locked doors until cleared by further follow up outpatient by neurology.      Case discussed with Dr. 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 Possibly precipitated by infection, recent antibiotic use. Less likely due to medication nonadherence.  Less likely acute infarct or bleed given CT head findings    Plan:  [ ] Admit to EMU for further monitoring and evaluation  [ ] Continue home dose AED for now. Keppra 500mg BID and Vimpat 200mg BID  [ ] 24 hr vEEG, please press red button for any events similar to above description.  [] Check AED levels  [] Seizure, fall and aspiration precautions.  Avoid sleep deprivation.  [] Seizure rescue medications for focal seizure lasting >3 min which doesn't resolve and/or any GTC:  ---->1st line: 2mg ativan IVP. May repeat x 1 if doesn't break within 3 minutes. (Max dose 0.1 mg/kg)    [] If any seizure activity noted, please note: time, if upper/lower or focal onset symptoms (e.g. head turn, eye deviation, upper/lower tonic/clonic arm initially), vital sign derangements, airway protection, urinary or bowel incontinence, duration of seizure, tongue bite, and/or post-ictal time to return of baseline.   [] Check CK  [] MRI brain w/ and w/o contrast for interval evaluation of known lesion  [] Given concern for seizure, advise patient to not drive, operate heavy machinery, avoid heights, pools, bathtubs, locked doors until cleared by further follow up outpatient by neurology.      Case discussed with Dr. Hagen

## 2023-07-09 LAB
BASOPHILS # BLD AUTO: 0.06 K/UL — SIGNIFICANT CHANGE UP (ref 0–0.2)
BASOPHILS NFR BLD AUTO: 0.8 % — SIGNIFICANT CHANGE UP (ref 0–2)
EOSINOPHIL # BLD AUTO: 0.22 K/UL — SIGNIFICANT CHANGE UP (ref 0–0.5)
EOSINOPHIL NFR BLD AUTO: 3.1 % — SIGNIFICANT CHANGE UP (ref 0–6)
HCT VFR BLD CALC: 37 % — LOW (ref 39–50)
HGB BLD-MCNC: 11.9 G/DL — LOW (ref 13–17)
IMM GRANULOCYTES NFR BLD AUTO: 0.6 % — SIGNIFICANT CHANGE UP (ref 0–0.9)
LYMPHOCYTES # BLD AUTO: 0.79 K/UL — LOW (ref 1–3.3)
LYMPHOCYTES # BLD AUTO: 11.1 % — LOW (ref 13–44)
MCHC RBC-ENTMCNC: 25.3 PG — LOW (ref 27–34)
MCHC RBC-ENTMCNC: 32.2 GM/DL — SIGNIFICANT CHANGE UP (ref 32–36)
MCV RBC AUTO: 78.7 FL — LOW (ref 80–100)
MONOCYTES # BLD AUTO: 0.91 K/UL — HIGH (ref 0–0.9)
MONOCYTES NFR BLD AUTO: 12.8 % — SIGNIFICANT CHANGE UP (ref 2–14)
NEUTROPHILS # BLD AUTO: 5.08 K/UL — SIGNIFICANT CHANGE UP (ref 1.8–7.4)
NEUTROPHILS NFR BLD AUTO: 71.6 % — SIGNIFICANT CHANGE UP (ref 43–77)
NRBC # BLD: 0 /100 WBCS — SIGNIFICANT CHANGE UP (ref 0–0)
PLATELET # BLD AUTO: 195 K/UL — SIGNIFICANT CHANGE UP (ref 150–400)
RBC # BLD: 4.7 M/UL — SIGNIFICANT CHANGE UP (ref 4.2–5.8)
RBC # FLD: 16.7 % — HIGH (ref 10.3–14.5)
WBC # BLD: 7.1 K/UL — SIGNIFICANT CHANGE UP (ref 3.8–10.5)
WBC # FLD AUTO: 7.1 K/UL — SIGNIFICANT CHANGE UP (ref 3.8–10.5)

## 2023-07-09 PROCEDURE — 95720 EEG PHY/QHP EA INCR W/VEEG: CPT

## 2023-07-09 PROCEDURE — 99222 1ST HOSP IP/OBS MODERATE 55: CPT

## 2023-07-09 RX ORDER — ACETAMINOPHEN 500 MG
650 TABLET ORAL EVERY 6 HOURS
Refills: 0 | Status: DISCONTINUED | OUTPATIENT
Start: 2023-07-09 | End: 2023-07-11

## 2023-07-09 RX ORDER — LEVETIRACETAM 250 MG/1
750 TABLET, FILM COATED ORAL
Refills: 0 | Status: DISCONTINUED | OUTPATIENT
Start: 2023-07-09 | End: 2023-07-11

## 2023-07-09 RX ORDER — LEVETIRACETAM 250 MG/1
250 TABLET, FILM COATED ORAL ONCE
Refills: 0 | Status: COMPLETED | OUTPATIENT
Start: 2023-07-09 | End: 2023-07-09

## 2023-07-09 RX ADMIN — Medication 650 MILLIGRAM(S): at 11:10

## 2023-07-09 RX ADMIN — MONTELUKAST 10 MILLIGRAM(S): 4 TABLET, CHEWABLE ORAL at 21:48

## 2023-07-09 RX ADMIN — LEVETIRACETAM 750 MILLIGRAM(S): 250 TABLET, FILM COATED ORAL at 17:48

## 2023-07-09 RX ADMIN — LACOSAMIDE 200 MILLIGRAM(S): 50 TABLET ORAL at 05:55

## 2023-07-09 RX ADMIN — BUDESONIDE AND FORMOTEROL FUMARATE DIHYDRATE 2 PUFF(S): 160; 4.5 AEROSOL RESPIRATORY (INHALATION) at 17:49

## 2023-07-09 RX ADMIN — LEVETIRACETAM 500 MILLIGRAM(S): 250 TABLET, FILM COATED ORAL at 05:47

## 2023-07-09 RX ADMIN — LEVETIRACETAM 250 MILLIGRAM(S): 250 TABLET, FILM COATED ORAL at 14:10

## 2023-07-09 RX ADMIN — BUDESONIDE AND FORMOTEROL FUMARATE DIHYDRATE 2 PUFF(S): 160; 4.5 AEROSOL RESPIRATORY (INHALATION) at 05:48

## 2023-07-09 RX ADMIN — Medication 650 MILLIGRAM(S): at 10:09

## 2023-07-09 RX ADMIN — LACOSAMIDE 200 MILLIGRAM(S): 50 TABLET ORAL at 17:48

## 2023-07-09 NOTE — PHYSICAL THERAPY INITIAL EVALUATION ADULT - PLANNED THERAPY INTERVENTIONS, PT EVAL
GOAL: Stair Negotiation Training: Patient will be able to negotiate up & down 1 flight of stairs with unilateral rail, step through gait pattern, independently in 4 weeks./balance training/bed mobility training/gait training/strengthening/transfer training

## 2023-07-09 NOTE — PHYSICAL THERAPY INITIAL EVALUATION ADULT - PERTINENT HX OF CURRENT PROBLEM, REHAB EVAL
Pt is a 77 y.o. 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 (-) CXR 7/8/23. CTH 7/8/23: No acute intracranial hemorrhage, mass effect, or evidence of acute vascular territorial infarction. Extensive paranasal sinus mucosal thickening and opacification.

## 2023-07-09 NOTE — PATIENT PROFILE ADULT - HOME ACCESSIBILITY CONCERNS
Spoke with patient, stated he would have to see if he still had his fit kit that was dispensed to him in March.  If not, he will let us know to send a new one.     none

## 2023-07-09 NOTE — PHYSICAL THERAPY INITIAL EVALUATION ADULT - ADDITIONAL COMMENTS
Pt lives with his wife in a apt with 5 steps to enter, +HR and 13 steps inside, +HR. +eyeglasses. Pt reports his sister in law is available to assist with transportation.

## 2023-07-09 NOTE — PATIENT PROFILE ADULT - FALL HARM RISK - UNIVERSAL INTERVENTIONS
Bed in lowest position, wheels locked, appropriate side rails in place/Call bell, personal items and telephone in reach/Instruct patient to call for assistance before getting out of bed or chair/Non-slip footwear when patient is out of bed/[place] to call system/Physically safe environment - no spills, clutter or unnecessary equipment/Purposeful Proactive Rounding/Room/bathroom lighting operational, light cord in reach

## 2023-07-10 LAB
ANION GAP SERPL CALC-SCNC: 14 MMOL/L — SIGNIFICANT CHANGE UP (ref 5–17)
BUN SERPL-MCNC: 20 MG/DL — SIGNIFICANT CHANGE UP (ref 7–23)
CALCIUM SERPL-MCNC: 8.7 MG/DL — SIGNIFICANT CHANGE UP (ref 8.4–10.5)
CHLORIDE SERPL-SCNC: 97 MMOL/L — SIGNIFICANT CHANGE UP (ref 96–108)
CO2 SERPL-SCNC: 21 MMOL/L — LOW (ref 22–31)
CREAT SERPL-MCNC: 0.83 MG/DL — SIGNIFICANT CHANGE UP (ref 0.5–1.3)
CULTURE RESULTS: SIGNIFICANT CHANGE UP
EGFR: 90 ML/MIN/1.73M2 — SIGNIFICANT CHANGE UP
GLUCOSE SERPL-MCNC: 95 MG/DL — SIGNIFICANT CHANGE UP (ref 70–99)
HCT VFR BLD CALC: 38.6 % — LOW (ref 39–50)
HGB BLD-MCNC: 12.5 G/DL — LOW (ref 13–17)
MAGNESIUM SERPL-MCNC: 2.1 MG/DL — SIGNIFICANT CHANGE UP (ref 1.6–2.6)
MCHC RBC-ENTMCNC: 25.1 PG — LOW (ref 27–34)
MCHC RBC-ENTMCNC: 32.4 GM/DL — SIGNIFICANT CHANGE UP (ref 32–36)
MCV RBC AUTO: 77.5 FL — LOW (ref 80–100)
NRBC # BLD: 0 /100 WBCS — SIGNIFICANT CHANGE UP (ref 0–0)
PHOSPHATE SERPL-MCNC: 2.5 MG/DL — SIGNIFICANT CHANGE UP (ref 2.5–4.5)
PLATELET # BLD AUTO: 220 K/UL — SIGNIFICANT CHANGE UP (ref 150–400)
POTASSIUM SERPL-MCNC: 3.9 MMOL/L — SIGNIFICANT CHANGE UP (ref 3.5–5.3)
POTASSIUM SERPL-SCNC: 3.9 MMOL/L — SIGNIFICANT CHANGE UP (ref 3.5–5.3)
RBC # BLD: 4.98 M/UL — SIGNIFICANT CHANGE UP (ref 4.2–5.8)
RBC # FLD: 16.4 % — HIGH (ref 10.3–14.5)
SODIUM SERPL-SCNC: 132 MMOL/L — LOW (ref 135–145)
SPECIMEN SOURCE: SIGNIFICANT CHANGE UP
WBC # BLD: 6.7 K/UL — SIGNIFICANT CHANGE UP (ref 3.8–10.5)
WBC # FLD AUTO: 6.7 K/UL — SIGNIFICANT CHANGE UP (ref 3.8–10.5)

## 2023-07-10 PROCEDURE — 70553 MRI BRAIN STEM W/O & W/DYE: CPT | Mod: 26

## 2023-07-10 PROCEDURE — 76377 3D RENDER W/INTRP POSTPROCES: CPT | Mod: 26

## 2023-07-10 PROCEDURE — 95720 EEG PHY/QHP EA INCR W/VEEG: CPT

## 2023-07-10 PROCEDURE — 76390 MR SPECTROSCOPY: CPT | Mod: 26

## 2023-07-10 RX ADMIN — Medication 650 MILLIGRAM(S): at 05:00

## 2023-07-10 RX ADMIN — Medication 650 MILLIGRAM(S): at 22:46

## 2023-07-10 RX ADMIN — Medication 650 MILLIGRAM(S): at 06:00

## 2023-07-10 RX ADMIN — LEVETIRACETAM 750 MILLIGRAM(S): 250 TABLET, FILM COATED ORAL at 17:06

## 2023-07-10 RX ADMIN — ENOXAPARIN SODIUM 40 MILLIGRAM(S): 100 INJECTION SUBCUTANEOUS at 12:25

## 2023-07-10 RX ADMIN — LACOSAMIDE 200 MILLIGRAM(S): 50 TABLET ORAL at 17:06

## 2023-07-10 RX ADMIN — MONTELUKAST 10 MILLIGRAM(S): 4 TABLET, CHEWABLE ORAL at 22:47

## 2023-07-10 RX ADMIN — Medication 650 MILLIGRAM(S): at 16:38

## 2023-07-10 RX ADMIN — LEVETIRACETAM 750 MILLIGRAM(S): 250 TABLET, FILM COATED ORAL at 05:00

## 2023-07-10 RX ADMIN — Medication 650 MILLIGRAM(S): at 10:13

## 2023-07-10 RX ADMIN — BUDESONIDE AND FORMOTEROL FUMARATE DIHYDRATE 2 PUFF(S): 160; 4.5 AEROSOL RESPIRATORY (INHALATION) at 05:00

## 2023-07-10 RX ADMIN — Medication 650 MILLIGRAM(S): at 23:16

## 2023-07-10 RX ADMIN — Medication 650 MILLIGRAM(S): at 11:11

## 2023-07-10 RX ADMIN — LACOSAMIDE 200 MILLIGRAM(S): 50 TABLET ORAL at 05:00

## 2023-07-10 NOTE — PROGRESS NOTE ADULT - ASSESSMENT
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 Possibly precipitated by infection, recent antibiotic use. Less likely due to medication nonadherence.  Less likely acute infarct or bleed given CT head findings    Plan:  [ ] Admit to EMU for further monitoring and evaluation  [ ] Continue home dose AED for now. Keppra 500mg BID and Vimpat 200mg BID  [ ] 24 hr vEEG, please press red button for any events similar to above description.  [] Check AED levels  [] Seizure, fall and aspiration precautions.  Avoid sleep deprivation.  [] Seizure rescue medications for focal seizure lasting >3 min which doesn't resolve and/or any GTC:  ---->1st line: 2mg ativan IVP. May repeat x 1 if doesn't break within 3 minutes. (Max dose 0.1 mg/kg)    [] If any seizure activity noted, please note: time, if upper/lower or focal onset symptoms (e.g. head turn, eye deviation, upper/lower tonic/clonic arm initially), vital sign derangements, airway protection, urinary or bowel incontinence, duration of seizure, tongue bite, and/or post-ictal time to return of baseline.   [] Check CK  [] MRI brain w/ and w/o contrast for interval evaluation of known lesion  [] Given concern for seizure, advise patient to not drive, operate heavy machinery, avoid heights, pools, bathtubs, locked doors until cleared by further follow up outpatient by neurology.    PT: recommended for outpatient PT  Case discussed with Dr. 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 Possibly precipitated by infection, recent antibiotic use. Less likely due to medication nonadherence.  Less likely acute infarct or bleed given CT head findings    Plan:  [ ] Admit to EMU for further monitoring and evaluation  [] MRI brain at 5pm  [ ] Continue home dose AED for now. Keppra 750mg BID and Vimpat 200mg BID  [ ] 24 hr vEEG, please press red button for any events similar to above description.  [] Check AED levels  [] Seizure, fall and aspiration precautions.  Avoid sleep deprivation.  [] Seizure rescue medications for focal seizure lasting >3 min which doesn't resolve and/or any GTC:  ---->1st line: 2mg ativan IVP. May repeat x 1 if doesn't break within 3 minutes. (Max dose 0.1 mg/kg)    [] If any seizure activity noted, please note: time, if upper/lower or focal onset symptoms (e.g. head turn, eye deviation, upper/lower tonic/clonic arm initially), vital sign derangements, airway protection, urinary or bowel incontinence, duration of seizure, tongue bite, and/or post-ictal time to return of baseline.  [] Given concern for seizure, advise patient to not drive, operate heavy machinery, avoid heights, pools, bathtubs, locked doors until cleared by further follow up outpatient by neurology.    PT: recommended for outpatient PT  Case discussed with Dr. Hagen

## 2023-07-10 NOTE — PROGRESS NOTE ADULT - SUBJECTIVE AND OBJECTIVE BOX
Neurology Progress Note    SUBJECTIVE/OBJECTIVE/INTERVAL EVENTS: Patient seen and examined at bedside w/ neuro attending and team.     INTERVAL HISTORY:    REVIEW OF SYSTEMS: Otherwise denies fever, chills, headaches, vision changes, nausea, vomiting, hearing change, focal weakness, focal numbness, bowel/ bladder incontinence. Few questions of a 10-system ROS was performed and is negative except for those items noted above and/or in the HPI.    VITALS & EXAMINATION:  Vital Signs Last 24 Hrs  T(C): 36.6 (10 Jul 2023 09:39), Max: 37.6 (10 Jul 2023 05:05)  T(F): 97.9 (10 Jul 2023 09:39), Max: 99.6 (10 Jul 2023 05:05)  HR: 69 (10 Jul 2023 09:39) (61 - 77)  BP: 109/69 (10 Jul 2023 09:39) (105/61 - 120/68)  BP(mean): --  RR: 18 (10 Jul 2023 09:39) (16 - 19)  SpO2: 94% (10 Jul 2023 09:39) (94% - 96%)    Parameters below as of 10 Jul 2023 09:39  Patient On (Oxygen Delivery Method): room air    Physical Exam: Physical Examination:   General - NAD, R elbow lesion with erythema and intact sutures   Cardiovascular - Peripheral pulses palpable, no edema  Eyes - Fundoscopy not performed due to safety precautions in the setting of the COVID-19 pandemic    Neurologic Exam:  Mental status - Awake, Alert, Oriented to person, place, and time. Speech fluent, repetition and naming intact. Follows simple and complex commands. Good attention/concentration, recent and remote memory (including registration and 3 word recall), and fund of knowledge intact.    Cranial nerves - PERRL, VFF, EOMI, face sensation (V1-V3) intact b/l, facial strength intact without asymmetry b/l, hearing intact b/l, palate with symmetric elevation, trapezius 5/5 strength b/l, tongue midline on protrusion with full lateral movement    Motor - Normal bulk and tone throughout. No pronator drift.  Strength testing            Deltoid      Biceps      Triceps     Wrist Extension    Wrist Flexion          R            5                 5               5                     5                              5                             5                     L             5                 5               5                     5                              5                             5                               Hip Flexion    Hip Extension    Knee Flexion    Knee Extension    Dorsiflexion    Plantar Flexion  R              5                           5                       5                           5                            5                          5  L              5                           5                        5                           5                            5                          5    Sensation - Light touch/temperature OR pain/vibration intact throughout    DTR's -             Biceps      Triceps     Brachioradialis      Patellar    Ankle    Toes/plantar response  R             2+             2+                  2+                       2+            2+                  Down  L              2+             2+                 2+                        2+           2+                   Down    Coordination - Finger to Nose intact b/l. No tremors appreciated    Gait and station - Deferred      LABORATORY:  CBC                       12.5   6.70  )-----------( 220      ( 10 Jul 2023 07:14 )             38.6     Chem 07-10    132<L>  |  97  |  20  ----------------------------<  95  3.9   |  21<L>  |  0.83    Ca    8.7      10 Jul 2023 07:10  Phos  2.5     07-10  Mg     2.1     07-10    TPro  7.3  /  Alb  4.0  /  TBili  0.5  /  DBili  x   /  AST  21  /  ALT  14  /  AlkPhos  100  07-08    LFTs LIVER FUNCTIONS - ( 08 Jul 2023 16:31 )  Alb: 4.0 g/dL / Pro: 7.3 g/dL / ALK PHOS: 100 U/L / ALT: 14 U/L / AST: 21 U/L / GGT: x           Coagulopathy PT/INR - ( 08 Jul 2023 16:31 )   PT: 14.9 sec;   INR: 1.29 ratio         PTT - ( 08 Jul 2023 16:31 )  PTT:31.8 sec  Lipid Panel   A1c   Cardiac enzymes     U/A Urinalysis Basic - ( 10 Jul 2023 07:10 )    Color: x / Appearance: x / SG: x / pH: x  Gluc: 95 mg/dL / Ketone: x  / Bili: x / Urobili: x   Blood: x / Protein: x / Nitrite: x   Leuk Esterase: x / RBC: x / WBC x   Sq Epi: x / Non Sq Epi: x / Bacteria: x      CSF  Immunological  Other    STUDIES & IMAGING: (EEG, CT, MR, U/S, TTE/STEVEN):  7/10/23 EEG:  This is an abnormal EEG record.   1.	Focal dysfunction in the left temporal region.  2.	Mild nonspecific diffuse cerebral dysfunction.  3.	No epileptiform discharges or seizures were recorded.     Neurology Progress Note    SUBJECTIVE/OBJECTIVE/INTERVAL EVENTS: Patient seen and examined at bedside w/ neuro attending and team.     INTERVAL HISTORY:   OVN: No acute events overnight    VITALS & EXAMINATION:  Vital Signs Last 24 Hrs  T(C): 36.6 (10 Jul 2023 09:39), Max: 37.6 (10 Jul 2023 05:05)  T(F): 97.9 (10 Jul 2023 09:39), Max: 99.6 (10 Jul 2023 05:05)  HR: 69 (10 Jul 2023 09:39) (61 - 77)  BP: 109/69 (10 Jul 2023 09:39) (105/61 - 120/68)  BP(mean): --  RR: 18 (10 Jul 2023 09:39) (16 - 19)  SpO2: 94% (10 Jul 2023 09:39) (94% - 96%)    Parameters below as of 10 Jul 2023 09:39  Patient On (Oxygen Delivery Method): room air    Physical Exam: Physical Examination:   General - NAD, R elbow lesion with erythema and intact sutures   Cardiovascular - Peripheral pulses palpable, no edema  Eyes - Fundoscopy not performed due to safety precautions in the setting of the COVID-19 pandemic    Neurologic Exam:  Mental status - Awake, Alert, Oriented to person, place, and time. Speech fluent, repetition and naming intact. Follows simple and complex commands. Good attention/concentration, recent and remote memory (including registration and 3 word recall), and fund of knowledge intact.    Cranial nerves - PERRL, VFF, EOMI, face sensation (V1-V3) intact b/l, facial strength intact without asymmetry b/l, hearing intact b/l, palate with symmetric elevation, trapezius 5/5 strength b/l, tongue midline on protrusion with full lateral movement    Motor - Normal bulk and tone throughout. No pronator drift.  Strength testing            Deltoid      Biceps      Triceps     Wrist Extension    Wrist Flexion          R            5                 5               5                     5                              5                             5                     L             5                 5               5                     5                              5                             5                               Hip Flexion    Hip Extension    Knee Flexion    Knee Extension    Dorsiflexion    Plantar Flexion  R              5                           5                       5                           5                            5                          5  L              5                           5                        5                           5                            5                          5    Sensation - Light touch/temperature OR pain/vibration intact throughout    DTR's -             Biceps      Triceps     Brachioradialis      Patellar    Ankle    Toes/plantar response  R             2+             2+                  2+                       2+            2+                  Down  L              2+             2+                 2+                        2+           2+                   Down    Coordination - Finger to Nose intact b/l. No tremors appreciated    Gait and station - Deferred      LABORATORY:  CBC                       12.5   6.70  )-----------( 220      ( 10 Jul 2023 07:14 )             38.6     Chem 07-10    132<L>  |  97  |  20  ----------------------------<  95  3.9   |  21<L>  |  0.83    Ca    8.7      10 Jul 2023 07:10  Phos  2.5     07-10  Mg     2.1     07-10    TPro  7.3  /  Alb  4.0  /  TBili  0.5  /  DBili  x   /  AST  21  /  ALT  14  /  AlkPhos  100  07-08    LFTs LIVER FUNCTIONS - ( 08 Jul 2023 16:31 )  Alb: 4.0 g/dL / Pro: 7.3 g/dL / ALK PHOS: 100 U/L / ALT: 14 U/L / AST: 21 U/L / GGT: x           Coagulopathy PT/INR - ( 08 Jul 2023 16:31 )   PT: 14.9 sec;   INR: 1.29 ratio         PTT - ( 08 Jul 2023 16:31 )  PTT:31.8 sec  Lipid Panel   A1c   Cardiac enzymes     U/A Urinalysis Basic - ( 10 Jul 2023 07:10 )    Color: x / Appearance: x / SG: x / pH: x  Gluc: 95 mg/dL / Ketone: x  / Bili: x / Urobili: x   Blood: x / Protein: x / Nitrite: x   Leuk Esterase: x / RBC: x / WBC x   Sq Epi: x / Non Sq Epi: x / Bacteria: x      CSF  Immunological  Other    STUDIES & IMAGING: (EEG, CT, MR, U/S, TTE/STEVEN):  7/10/23 EEG:  This is an abnormal EEG record.   1.	Focal dysfunction in the left temporal region.  2.	Mild nonspecific diffuse cerebral dysfunction.  3.	No epileptiform discharges or seizures were recorded.

## 2023-07-10 NOTE — EEG REPORT - NS EEG TEXT BOX
DAY 1 	START: 7/9/2023  3:23:34 PM     	END: 7/10/2023  08:00  	DURATION: 15 HR  26 MIN    DAILY EEG VISUAL ANALYSIS    The background was continuous, symmetric, spontaneously variable and reactive. During wakefulness, the posterior dominant rhythm consisted of symmetric, well-modulated 7-8 Hz activity, with amplitude to 30 uV, that attenuated to eye opening.  Low amplitude frontal beta was noted in wakefulness.   The anterior to posterior gradient was present.     BACKGROUND SLOWING:  -Posterior dominant background slowing    FOCAL SLOWING:   -Intermittent polymorphic delta slowing in left temporal region    SLEEP BACKGROUND:  Drowsiness was characterized by fragmentation, attenuation, and slowing of the background activity.    Sleep was characterized by the presence of vertex waves, symmetric sleep spindles and K-complexes.    OTHER NON-EPILEPTIFORM FINDINGS:  None were present.    INTERICTAL EPILEPTIFORM ACTIVITY:   None were present.    EVENTS:  No events or seizures recorded.    ARTIFACTS:  Intermittent myogenic and movement artifacts were noted.    ECG:  The heart rate on single channel ECG was predominantly between 60-80 BPM.    ASMs:   LEV 750mg BID  LCM 200mg BId  -------------------------------------------------------------------------------------------------------------------------------------------------------  EEG SUMMARY:  Abnormal EEG in the awake, drowsy and asleep states.  -Intermittent polymorphic delta slowing in left temporal region  -Posterior dominant background slowing    -------------------------------------------------------------------------------------------------------------------------------------------------------  IMPRESSION/CLINICAL CORRELATE:  This is an abnormal EEG record.   1.	Focal dysfunction in the left temporal region.  2.	Mild nonspecific diffuse cerebral dysfunction.  3.	No epileptiform discharges or seizures were recorded.    Ramesh Jhaveri MD  Neurology Attending Physician

## 2023-07-11 ENCOUNTER — TRANSCRIPTION ENCOUNTER (OUTPATIENT)
Age: 78
End: 2023-07-11

## 2023-07-11 VITALS
RESPIRATION RATE: 18 BRPM | HEART RATE: 70 BPM | SYSTOLIC BLOOD PRESSURE: 103 MMHG | DIASTOLIC BLOOD PRESSURE: 62 MMHG | TEMPERATURE: 98 F | OXYGEN SATURATION: 96 %

## 2023-07-11 PROCEDURE — 82803 BLOOD GASES ANY COMBINATION: CPT

## 2023-07-11 PROCEDURE — 70450 CT HEAD/BRAIN W/O DYE: CPT | Mod: MA

## 2023-07-11 PROCEDURE — 85730 THROMBOPLASTIN TIME PARTIAL: CPT

## 2023-07-11 PROCEDURE — 85025 COMPLETE CBC W/AUTO DIFF WBC: CPT

## 2023-07-11 PROCEDURE — 80053 COMPREHEN METABOLIC PANEL: CPT

## 2023-07-11 PROCEDURE — 82435 ASSAY OF BLOOD CHLORIDE: CPT

## 2023-07-11 PROCEDURE — 96360 HYDRATION IV INFUSION INIT: CPT

## 2023-07-11 PROCEDURE — 83605 ASSAY OF LACTIC ACID: CPT

## 2023-07-11 PROCEDURE — 70553 MRI BRAIN STEM W/O & W/DYE: CPT

## 2023-07-11 PROCEDURE — 81001 URINALYSIS AUTO W/SCOPE: CPT

## 2023-07-11 PROCEDURE — 97110 THERAPEUTIC EXERCISES: CPT

## 2023-07-11 PROCEDURE — 82947 ASSAY GLUCOSE BLOOD QUANT: CPT

## 2023-07-11 PROCEDURE — 83735 ASSAY OF MAGNESIUM: CPT

## 2023-07-11 PROCEDURE — 84295 ASSAY OF SERUM SODIUM: CPT

## 2023-07-11 PROCEDURE — 93005 ELECTROCARDIOGRAM TRACING: CPT

## 2023-07-11 PROCEDURE — 76377 3D RENDER W/INTRP POSTPROCES: CPT

## 2023-07-11 PROCEDURE — 87086 URINE CULTURE/COLONY COUNT: CPT

## 2023-07-11 PROCEDURE — 80307 DRUG TEST PRSMV CHEM ANLYZR: CPT

## 2023-07-11 PROCEDURE — 87040 BLOOD CULTURE FOR BACTERIA: CPT

## 2023-07-11 PROCEDURE — A9585: CPT

## 2023-07-11 PROCEDURE — 80048 BASIC METABOLIC PNL TOTAL CA: CPT

## 2023-07-11 PROCEDURE — 85610 PROTHROMBIN TIME: CPT

## 2023-07-11 PROCEDURE — 36415 COLL VENOUS BLD VENIPUNCTURE: CPT

## 2023-07-11 PROCEDURE — 97161 PT EVAL LOW COMPLEX 20 MIN: CPT

## 2023-07-11 PROCEDURE — 99285 EMERGENCY DEPT VISIT HI MDM: CPT | Mod: 25

## 2023-07-11 PROCEDURE — 76390 MR SPECTROSCOPY: CPT

## 2023-07-11 PROCEDURE — 95716 VEEG EA 12-26HR CONT MNTR: CPT

## 2023-07-11 PROCEDURE — 97116 GAIT TRAINING THERAPY: CPT

## 2023-07-11 PROCEDURE — 95713 VEEG 2-12 HR CONT MNTR: CPT

## 2023-07-11 PROCEDURE — 85027 COMPLETE CBC AUTOMATED: CPT

## 2023-07-11 PROCEDURE — 0225U NFCT DS DNA&RNA 21 SARSCOV2: CPT

## 2023-07-11 PROCEDURE — 85014 HEMATOCRIT: CPT

## 2023-07-11 PROCEDURE — 85018 HEMOGLOBIN: CPT

## 2023-07-11 PROCEDURE — 94640 AIRWAY INHALATION TREATMENT: CPT

## 2023-07-11 PROCEDURE — 95718 EEG PHYS/QHP 2-12 HR W/VEEG: CPT

## 2023-07-11 PROCEDURE — 71045 X-RAY EXAM CHEST 1 VIEW: CPT

## 2023-07-11 PROCEDURE — 84132 ASSAY OF SERUM POTASSIUM: CPT

## 2023-07-11 PROCEDURE — 82330 ASSAY OF CALCIUM: CPT

## 2023-07-11 PROCEDURE — 95700 EEG CONT REC W/VID EEG TECH: CPT

## 2023-07-11 PROCEDURE — 84100 ASSAY OF PHOSPHORUS: CPT

## 2023-07-11 RX ORDER — ACETAMINOPHEN 500 MG
325 TABLET ORAL
Qty: 60 | Refills: 1
Start: 2023-07-11

## 2023-07-11 RX ORDER — LEVETIRACETAM 250 MG/1
1 TABLET, FILM COATED ORAL
Qty: 60 | Refills: 1
Start: 2023-07-11

## 2023-07-11 RX ORDER — LEVETIRACETAM 250 MG/1
1 TABLET, FILM COATED ORAL
Qty: 60 | Refills: 1
Start: 2023-07-11 | End: 2023-09-08

## 2023-07-11 RX ORDER — KETOROLAC TROMETHAMINE 30 MG/ML
30 SYRINGE (ML) INJECTION ONCE
Refills: 0 | Status: DISCONTINUED | OUTPATIENT
Start: 2023-07-11 | End: 2023-07-11

## 2023-07-11 RX ADMIN — Medication 30 MILLIGRAM(S): at 13:23

## 2023-07-11 RX ADMIN — ENOXAPARIN SODIUM 40 MILLIGRAM(S): 100 INJECTION SUBCUTANEOUS at 11:09

## 2023-07-11 RX ADMIN — Medication 650 MILLIGRAM(S): at 05:25

## 2023-07-11 RX ADMIN — LACOSAMIDE 200 MILLIGRAM(S): 50 TABLET ORAL at 05:36

## 2023-07-11 RX ADMIN — BUDESONIDE AND FORMOTEROL FUMARATE DIHYDRATE 2 PUFF(S): 160; 4.5 AEROSOL RESPIRATORY (INHALATION) at 05:36

## 2023-07-11 RX ADMIN — LEVETIRACETAM 750 MILLIGRAM(S): 250 TABLET, FILM COATED ORAL at 05:36

## 2023-07-11 RX ADMIN — Medication 650 MILLIGRAM(S): at 11:09

## 2023-07-11 RX ADMIN — Medication 30 MILLIGRAM(S): at 12:26

## 2023-07-11 RX ADMIN — Medication 650 MILLIGRAM(S): at 04:55

## 2023-07-11 RX ADMIN — Medication 650 MILLIGRAM(S): at 12:20

## 2023-07-11 NOTE — EEG REPORT - NS EEG TEXT BOX
DAY 2    START: 7/10/2023  08:00   	END: 7/11/2023  08:00  	DURATION: 24 HR  00 MIN      DAILY EEG VISUAL ANALYSIS    The background was continuous, symmetric, spontaneously variable and reactive. During wakefulness, the posterior dominant rhythm consisted of symmetric, well-modulated 7-8 Hz activity, with amplitude to 30 uV, that attenuated to eye opening.  Low amplitude frontal beta was noted in wakefulness.   The anterior to posterior gradient was present.     BACKGROUND SLOWING:  -Posterior dominant background slowing    FOCAL SLOWING:   -Intermittent polymorphic delta slowing in left temporal region    SLEEP BACKGROUND:  Drowsiness was characterized by fragmentation, attenuation, and slowing of the background activity.    Sleep was characterized by the presence of vertex waves, symmetric sleep spindles and K-complexes.    OTHER NON-EPILEPTIFORM FINDINGS:  None were present.    INTERICTAL EPILEPTIFORM ACTIVITY:   None were present.    EVENTS:  No events or seizures recorded.    ARTIFACTS:  Intermittent myogenic and movement artifacts were noted.    ECG:  The heart rate on single channel ECG was predominantly between 60-80 BPM.    ASMs:   LEV 750mg BID  LCM 200mg BId  -------------------------------------------------------------------------------------------------------------------------------------------------------  EEG SUMMARY:  Abnormal EEG in the awake, drowsy and asleep states.  -Intermittent polymorphic delta slowing in left temporal region  -Posterior dominant background slowing    -------------------------------------------------------------------------------------------------------------------------------------------------------  IMPRESSION/CLINICAL CORRELATE:  This is an abnormal EEG record.   1.	Focal dysfunction in the left temporal region.  2.	Mild nonspecific diffuse cerebral dysfunction.  3.	No epileptiform discharges or seizures were recorded.      RICH Lopez  Epilepsy Fellow  EEG reading room [facility] phone #[phone]    Ramesh Jhaveri MD  Neurology Attending Physician     DAY 2    START: 7/10/2023  08:00   	END: 7/11/2023  08:00  	DURATION: 24 HR  00 MIN      DAILY EEG VISUAL ANALYSIS    The background was continuous, symmetric, spontaneously variable and reactive. During wakefulness, the posterior dominant rhythm consisted of symmetric, well-modulated 7-8 Hz activity, with amplitude to 30 uV, that attenuated to eye opening.  Low amplitude frontal beta was noted in wakefulness.   The anterior to posterior gradient was present.     BACKGROUND SLOWING:  -Posterior dominant background slowing    FOCAL SLOWING:   -Intermittent polymorphic delta slowing in left temporal region    SLEEP BACKGROUND:  Drowsiness was characterized by fragmentation, attenuation, and slowing of the background activity.    Sleep was characterized by the presence of vertex waves, symmetric sleep spindles and K-complexes.    OTHER NON-EPILEPTIFORM FINDINGS:  None were present.    INTERICTAL EPILEPTIFORM ACTIVITY:   None were present.    EVENTS:  No events or seizures recorded.    ARTIFACTS:  Intermittent myogenic and movement artifacts were noted.    ECG:  The heart rate on single channel ECG was predominantly between 60-80 BPM.    ASMs:   LEV 750mg BID  LCM 200mg BId  -------------------------------------------------------------------------------------------------------------------------------------------------------  EEG SUMMARY:  Abnormal EEG in the awake, drowsy and asleep states.  -Intermittent polymorphic delta slowing in left temporal region  -Posterior dominant background slowing    -------------------------------------------------------------------------------------------------------------------------------------------------------  IMPRESSION/CLINICAL CORRELATE:  This is an abnormal EEG record.   1.	Focal dysfunction in the left temporal region.  2.	Mild nonspecific diffuse cerebral dysfunction.  3.	No epileptiform discharges or seizures were recorded.      RICH Lopez  Epilepsy Fellow  EEG reading room [facility] phone #[phone]    Ramesh Jhaveri MD  Neurology Attending Physician.     DAY 2    START: 7/10/2023  08:00   	END: 7/11/2023  12:16  	DURATION: 28 HR  16 MIN      DAILY EEG VISUAL ANALYSIS    The background was continuous, symmetric, spontaneously variable and reactive. During wakefulness, the posterior dominant rhythm consisted of symmetric, well-modulated 7-8 Hz activity, with amplitude to 30 uV, that attenuated to eye opening.  Low amplitude frontal beta was noted in wakefulness.   The anterior to posterior gradient was present.     BACKGROUND SLOWING:  -Posterior dominant background slowing    FOCAL SLOWING:   -Intermittent polymorphic delta slowing in left temporal region    SLEEP BACKGROUND:  Drowsiness was characterized by fragmentation, attenuation, and slowing of the background activity.    Sleep was characterized by the presence of vertex waves, symmetric sleep spindles and K-complexes.    OTHER NON-EPILEPTIFORM FINDINGS:  None were present.    INTERICTAL EPILEPTIFORM ACTIVITY:   None were present.    EVENTS:  No events or seizures recorded.    ARTIFACTS:  Intermittent myogenic and movement artifacts were noted.    ECG:  The heart rate on single channel ECG was predominantly between 60-80 BPM.    ASMs:   LEV 750mg BID  LCM 200mg BId  -------------------------------------------------------------------------------------------------------------------------------------------------------  EEG SUMMARY:  Abnormal EEG in the awake, drowsy and asleep states.  -Intermittent polymorphic delta slowing in left temporal region  -Posterior dominant background slowing    -------------------------------------------------------------------------------------------------------------------------------------------------------  IMPRESSION/CLINICAL CORRELATE:  This is an abnormal EEG record.   1.	Focal dysfunction in the left temporal region.  2.	Mild nonspecific diffuse cerebral dysfunction.  3.	No epileptiform discharges or seizures were recorded.      RICH Lopez  Epilepsy Fellow  EEG reading room [facility] phone #[phone]    Ramesh Jhaveri MD  Neurology Attending Physician.     DAY 2    START: 7/10/2023  08:00   	END: 7/11/2023  12:16  	DURATION: 28 HR  16 MIN      DAILY EEG VISUAL ANALYSIS    The background was continuous, symmetric, spontaneously variable and reactive. During wakefulness, the posterior dominant rhythm consisted of symmetric, well-modulated 7-8 Hz activity, with amplitude to 30 uV, that attenuated to eye opening.  Low amplitude frontal beta was noted in wakefulness.   The anterior to posterior gradient was present.     BACKGROUND SLOWING:  -Posterior dominant background slowing    FOCAL SLOWING:   -Intermittent polymorphic delta slowing in left temporal region    SLEEP BACKGROUND:  Drowsiness was characterized by fragmentation, attenuation, and slowing of the background activity.    Sleep was characterized by the presence of vertex waves, symmetric sleep spindles and K-complexes.    OTHER NON-EPILEPTIFORM FINDINGS:  None were present.    INTERICTAL EPILEPTIFORM ACTIVITY:   None were present.    EVENTS:  No events or seizures recorded.    ARTIFACTS:  Intermittent myogenic and movement artifacts were noted.    ECG:  The heart rate on single channel ECG was predominantly between 60-80 BPM.    ASMs:   LEV 750mg BID  LCM 200mg BId  -------------------------------------------------------------------------------------------------------------------------------------------------------  EEG SUMMARY:  Abnormal EEG in the awake, drowsy and asleep states.  -Intermittent polymorphic delta slowing in left temporal region  -Posterior dominant background slowing    -------------------------------------------------------------------------------------------------------------------------------------------------------  IMPRESSION/CLINICAL CORRELATE:  This is an abnormal EEG record.   1.	Focal dysfunction in the left temporal region.  2.	Mild nonspecific diffuse cerebral dysfunction.  3.	No epileptiform discharges or seizures were recorded.      RICH Lopez  Epilepsy Fellow  EEG reading room [facility] phone #[phone].    Ramesh Jhaveri MD  Neurology Attending Physician.

## 2023-07-11 NOTE — DISCHARGE NOTE PROVIDER - NSDCFUSCHEDAPPT_GEN_ALL_CORE_FT
Galdino Thakkar  [facility]  NEUROLOGY [address] [facility]  Scheduled Appointment: 09/28/2023     Carly Arteaga  [facility]  NEUROLOGY [address] [facility]  Scheduled Appointment: 07/19/2023    Jovanny Swete  [facility]  NEUROLOGY [address] [facility]  Scheduled Appointment: 08/29/2023    Galdino Thakkar  [facility]  NEUROLOGY [address] [facility]  Scheduled Appointment: 09/28/2023

## 2023-07-11 NOTE — DISCHARGE NOTE PROVIDER - NSDCCPCAREPLAN_GEN_ALL_CORE_FT
PRINCIPAL DISCHARGE DIAGNOSIS  Diagnosis: Acute encephalopathy  Assessment and Plan of Treatment:      PRINCIPAL DISCHARGE DIAGNOSIS  Diagnosis: Acute encephalopathy  Assessment and Plan of Treatment: Follow up with your Primary Care Physician within the next 2-3 days  Follow up with your Neurologist for routine visit  Continue medications as reconciled  Contact your Neurologist, Primary Care Provider or report to the Emergency Room if you experience new or worsening symptoms

## 2023-07-11 NOTE — DISCHARGE NOTE NURSING/CASE MANAGEMENT/SOCIAL WORK - PATIENT PORTAL LINK FT
You can access the FollowMyHealth Patient Portal offered by [facility] by registering at the following website: [email or web address]. By joining [employer]’s FollowMyHealth portal, you will also be able to view your health information using other applications (apps) compatible with our system.

## 2023-07-11 NOTE — DISCHARGE NOTE PROVIDER - HOSPITAL COURSE
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 Wife reports patient was in usual state of health but this morning she noticed patient was slow to respond to questions and appeared confused. Patient was A&OX2 initially (not to time) when brought to the ED and improved to A&OX3 upon neuro assessment.  Patient is compliant with home AEDs.     Pt. was admitted to EMU for vEEG monitoring on 7/8. Pt. vEEG on 7/10 and 7/11 showed focal dysfunction in the left temporal region, mild nonspecific diffuse cerebral dysfunction, and no epileptiform discharge or seizures were recorded. During this monitoring, pt. was on Keppra 750 mg BID and Vimpat 200 mg BID. On 7/10 pt. received MRI brain and MR spectroscopy showing the left temporal lobe is still slightly large compared with the right but there is no longer abnormal signal identified. MR spectroscopy appears relatively symmetric right and left temporal lobes with slightly elevated creatine level. No definite abnormality were identified.        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 Wife reports patient was in usual state of health but this morning she noticed patient was slow to respond to questions and appeared confused. Patient was A&OX2 initially (not to time) when brought to the ED and improved to A&OX3 upon neuro assessment.  Patient is compliant with home AEDs.     Pt. was admitted to EMU for vEEG monitoring on 7/8. Pt. vEEG on 7/10 and 7/11 showed focal dysfunction in the left temporal region, mild nonspecific diffuse cerebral dysfunction, and no epileptiform discharge or seizures were recorded. During this monitoring, pt. was on Keppra 750 mg BID and Vimpat 200 mg BID.     On 7/10 pt. received MRI brain and MR spectroscopy showing the left temporal lobe is still slightly large compared with the right but there is no longer abnormal signal identified. MR spectroscopy appears relatively symmetric right and left temporal lobes with slightly elevated creatine level. No definite abnormality were identified.    7/11 Case discussed with Dr. Jordan. Patient is medically cleared for discharge.

## 2023-07-11 NOTE — PROGRESS NOTE ADULT - SUBJECTIVE AND OBJECTIVE BOX
Neurology Progress Note    SUBJECTIVE/OBJECTIVE/INTERVAL EVENTS: Patient seen and examined at bedside w/ neuro attending and team.     INTERVAL HISTORY:    REVIEW OF SYSTEMS: Otherwise denies fever, chills, headaches, vision changes, nausea, vomiting, hearing change, focal weakness, focal numbness, bowel/ bladder incontinence. Few questions of a 10-system ROS was performed and is negative except for those items noted above and/or in the HPI.    VITALS & EXAMINATION:  Vital Signs Last 24 Hrs  T(C): 36.9 (11 Jul 2023 13:20), Max: 37.1 (11 Jul 2023 00:39)  T(F): 98.5 (11 Jul 2023 13:20), Max: 98.8 (11 Jul 2023 00:39)  HR: 70 (11 Jul 2023 13:20) (68 - 74)  BP: 103/62 (11 Jul 2023 13:20) (103/62 - 113/70)  BP(mean): --  RR: 18 (11 Jul 2023 13:20) (18 - 19)  SpO2: 96% (11 Jul 2023 13:20) (94% - 96%)    Parameters below as of 11 Jul 2023 13:20  Patient On (Oxygen Delivery Method): room air    Physical Exam: Physical Examination:   General - NAD, R elbow lesion with erythema and intact sutures   Cardiovascular - Peripheral pulses palpable, no edema  Eyes - Fundoscopy not performed due to safety precautions in the setting of the COVID-19 pandemic    Neurologic Exam:  Mental status - Awake, Alert, Oriented to person, place, and time. Speech fluent, repetition and naming intact. Follows simple and complex commands. Good attention/concentration, recent and remote memory (including registration and 3 word recall), and fund of knowledge intact.    Cranial nerves - PERRL, VFF, EOMI, face sensation (V1-V3) intact b/l, facial strength intact without asymmetry b/l, hearing intact b/l, palate with symmetric elevation, trapezius 5/5 strength b/l, tongue midline on protrusion with full lateral movement    Motor - Normal bulk and tone throughout. No pronator drift.  Strength testing            Deltoid      Biceps      Triceps     Wrist Extension    Wrist Flexion          R            5                 5               5                     5                              5                             5                     L             5                 5               5                     5                              5                             5                               Hip Flexion    Hip Extension    Knee Flexion    Knee Extension    Dorsiflexion    Plantar Flexion  R              5                           5                       5                           5                            5                          5  L              5                           5                        5                           5                            5                          5    Sensation - Light touch/temperature OR pain/vibration intact throughout    DTR's -             Biceps      Triceps     Brachioradialis      Patellar    Ankle    Toes/plantar response  R             2+             2+                  2+                       2+            2+                  Down  L              2+             2+                 2+                        2+           2+                   Down    Coordination - Finger to Nose intact b/l. No tremors appreciated  Gait and station - Deferred    LABORATORY:  CBC                       12.5   6.70  )-----------( 220      ( 10 Jul 2023 07:14 )             38.6     Chem 07-10    132<L>  |  97  |  20  ----------------------------<  95  3.9   |  21<L>  |  0.83    Ca    8.7      10 Jul 2023 07:10  Phos  2.5     07-10  Mg     2.1     07-10      LFTs   Coagulopathy   Lipid Panel   A1c   Cardiac enzymes     U/A Urinalysis Basic - ( 10 Jul 2023 07:10 )    Color: x / Appearance: x / SG: x / pH: x  Gluc: 95 mg/dL / Ketone: x  / Bili: x / Urobili: x   Blood: x / Protein: x / Nitrite: x   Leuk Esterase: x / RBC: x / WBC x   Sq Epi: x / Non Sq Epi: x / Bacteria: x      STUDIES & IMAGING: (EEG, CT, MR, U/S, TTE/STEVEN):  7/11/23 EEG  IMPRESSION/CLINICAL CORRELATE:  This is an abnormal EEG record.   1.	Focal dysfunction in the left temporal region.  2.	Mild nonspecific diffuse cerebral dysfunction.  3.	No epileptiform discharges or seizures were recorded.    MRI brain 7/10/23  IMPRESSION: Although the left temporal lobe is still slightly large   compared with the right there is no longer abnormal signal identified. MR   spectroscopy appears relatively symmetric right and left temporal lobes   slightly elevated creatine level which is nonspecific. Normal OLI. No   definite abnormality is identified.

## 2023-07-11 NOTE — DISCHARGE NOTE NURSING/CASE MANAGEMENT/SOCIAL WORK - NSDCPEFALRISK_GEN_ALL_CORE
For information on Fall & Injury Prevention, visit: [email or web address] OR  [email or web address] OR  https://www.cdc.gov/steadi/patient.html

## 2023-07-11 NOTE — PROGRESS NOTE ADULT - ASSESSMENT
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 Possibly precipitated by infection, recent antibiotic use. Less likely due to medication nonadherence.  Less likely acute infarct or bleed given CT head findings    Plan:  [] d/c EEG, plan for discharge today  [] outpatient follow up with Dr. Sweet   [] 1x toradol for headache  [ ] Keppra 750mg ER BID on discharge and Vimpat 200mg BID  [] Seizure, fall and aspiration precautions.  Avoid sleep deprivation.  [] Seizure rescue medications for focal seizure lasting >3 min which doesn't resolve and/or any GTC:  ---->1st line: 2mg ativan IVP. May repeat x 1 if doesn't break within 3 minutes. (Max dose 0.1 mg/kg)    [] If any seizure activity noted, please note: time, if upper/lower or focal onset symptoms (e.g. head turn, eye deviation, upper/lower tonic/clonic arm initially), vital sign derangements, airway protection, urinary or bowel incontinence, duration of seizure, tongue bite, and/or post-ictal time to return of baseline.  [] Given concern for seizure, advise patient to not drive, operate heavy machinery, avoid heights, pools, bathtubs, locked doors until cleared by further follow up outpatient by neurology.    PT: recommended for outpatient PT

## 2023-07-11 NOTE — DISCHARGE NOTE PROVIDER - NSDCMRMEDTOKEN_GEN_ALL_CORE_FT
albuterol 90 mcg/inh inhalation aerosol: 2 puff(s) inhaled every 6 hours, As Needed  Breo Ellipta 200 mcg-25 mcg/inh inhalation powder: 1 puff(s) inhaled once a day  Flonase 50 mcg/inh nasal spray: 2 spray(s) nasal 2 times a day  lacosamide 200 mg oral tablet: 1 tab(s) orally 2 times a day MDD:400mg Daily  levETIRAcetam 750 mg oral tablet: 1 tab(s) orally 2 times a day  montelukast 10 mg oral tablet: 1 tab(s) orally once a day   albuterol 90 mcg/inh inhalation aerosol: 2 puff(s) inhaled every 6 hours, As Needed  Breo Ellipta 200 mcg-25 mcg/inh inhalation powder: 1 puff(s) inhaled once a day  Flonase 50 mcg/inh nasal spray: 2 spray(s) nasal 2 times a day  lacosamide 200 mg oral tablet: 1 tab(s) orally 2 times a day MDD:400mg Daily  levETIRAcetam 750 mg oral tablet: 1 tab(s) orally 2 times a day  montelukast 10 mg oral tablet: 1 tab(s) orally once a day  Tylenol 325 mg oral tablet: 325 tab(s) orally every 6 hours as needed for Moderate Pain (4 - 6) MDD: 2000 mg   albuterol 90 mcg/inh inhalation aerosol: 2 puff(s) inhaled every 6 hours, As Needed  Breo Ellipta 200 mcg-25 mcg/inh inhalation powder: 1 puff(s) inhaled once a day  Flonase 50 mcg/inh nasal spray: 2 spray(s) nasal 2 times a day  lacosamide 200 mg oral tablet: 1 tab(s) orally 2 times a day MDD:400mg Daily  levETIRAcetam 750 mg oral tablet, extended release: 1 tab(s) orally twice a day after breakfast and dinner  montelukast 10 mg oral tablet: 1 tab(s) orally once a day  Tylenol 325 mg oral tablet: 325 tab(s) orally every 6 hours as needed for Moderate Pain (4 - 6) MDD: 2000 mg

## 2023-07-11 NOTE — DISCHARGE NOTE PROVIDER - CARE PROVIDER_API CALL
Meri Jordan  Neurology  [address], [address]  [place] [address]  Phone: [phone]  Fax: [phone]  Follow Up Time:     Jovanny Sweet  Neurology  [address] [place] [address], [address]  [place] [address]  Phone: [phone]  Fax: [phone]  Follow Up Time:    No signs of meconium exposure, Normal patterns of skin texture, integrity, pigmentation, color, vascularity, and perfusion; No rashes or eruptions.

## 2023-07-13 LAB
CULTURE RESULTS: SIGNIFICANT CHANGE UP
CULTURE RESULTS: SIGNIFICANT CHANGE UP
SPECIMEN SOURCE: SIGNIFICANT CHANGE UP
SPECIMEN SOURCE: SIGNIFICANT CHANGE UP

## 2023-07-16 ENCOUNTER — INPATIENT (INPATIENT)
Facility: HOSPITAL | Age: 78
LOS: 5 days | Discharge: HOME | DRG: 91 | End: 2023-07-22
Attending: STUDENT IN AN ORGANIZED HEALTH CARE EDUCATION/TRAINING PROGRAM | Admitting: STUDENT IN AN ORGANIZED HEALTH CARE EDUCATION/TRAINING PROGRAM
Payer: MEDICARE

## 2023-07-16 VITALS
DIASTOLIC BLOOD PRESSURE: 78 MMHG | OXYGEN SATURATION: 94 % | SYSTOLIC BLOOD PRESSURE: 134 MMHG | TEMPERATURE: 98 F | HEIGHT: 66 IN | RESPIRATION RATE: 16 BRPM | WEIGHT: 141.98 LBS | HEART RATE: 99 BPM

## 2023-07-16 DIAGNOSIS — Z98.890 OTHER SPECIFIED POSTPROCEDURAL STATES: Chronic | ICD-10-CM

## 2023-07-16 PROCEDURE — 99285 EMERGENCY DEPT VISIT HI MDM: CPT

## 2023-07-16 NOTE — ED ADULT TRIAGE NOTE - CHIEF COMPLAINT QUOTE
unable to walk x1 day, recent discharge for acute encephalopathy, referred to follow up with neurology

## 2023-07-17 DIAGNOSIS — Z87.898 PERSONAL HISTORY OF OTHER SPECIFIED CONDITIONS: ICD-10-CM

## 2023-07-17 DIAGNOSIS — R26.2 DIFFICULTY IN WALKING, NOT ELSEWHERE CLASSIFIED: ICD-10-CM

## 2023-07-17 DIAGNOSIS — J44.9 CHRONIC OBSTRUCTIVE PULMONARY DISEASE, UNSPECIFIED: ICD-10-CM

## 2023-07-17 DIAGNOSIS — Z29.9 ENCOUNTER FOR PROPHYLACTIC MEASURES, UNSPECIFIED: ICD-10-CM

## 2023-07-17 DIAGNOSIS — R53.1 WEAKNESS: ICD-10-CM

## 2023-07-17 DIAGNOSIS — L08.9 LOCAL INFECTION OF THE SKIN AND SUBCUTANEOUS TISSUE, UNSPECIFIED: ICD-10-CM

## 2023-07-17 DIAGNOSIS — E87.1 HYPO-OSMOLALITY AND HYPONATREMIA: ICD-10-CM

## 2023-07-17 LAB
ALBUMIN SERPL ELPH-MCNC: 4.1 G/DL — SIGNIFICANT CHANGE UP (ref 3.3–5)
ALP SERPL-CCNC: 85 U/L — SIGNIFICANT CHANGE UP (ref 40–120)
ALT FLD-CCNC: 44 U/L — SIGNIFICANT CHANGE UP (ref 10–45)
ANION GAP SERPL CALC-SCNC: 12 MMOL/L — SIGNIFICANT CHANGE UP (ref 5–17)
ANION GAP SERPL CALC-SCNC: 14 MMOL/L — SIGNIFICANT CHANGE UP (ref 5–17)
ANISOCYTOSIS BLD QL: SLIGHT — SIGNIFICANT CHANGE UP
APPEARANCE UR: CLEAR — SIGNIFICANT CHANGE UP
AST SERPL-CCNC: 57 U/L — HIGH (ref 10–40)
BACTERIA # UR AUTO: ABNORMAL
BASOPHILS # BLD AUTO: 0.09 K/UL — SIGNIFICANT CHANGE UP (ref 0–0.2)
BASOPHILS NFR BLD AUTO: 0.9 % — SIGNIFICANT CHANGE UP (ref 0–2)
BILIRUB SERPL-MCNC: 0.5 MG/DL — SIGNIFICANT CHANGE UP (ref 0.2–1.2)
BILIRUB UR-MCNC: NEGATIVE — SIGNIFICANT CHANGE UP
BUN SERPL-MCNC: 17 MG/DL — SIGNIFICANT CHANGE UP (ref 7–23)
BUN SERPL-MCNC: 17 MG/DL — SIGNIFICANT CHANGE UP (ref 7–23)
CALCIUM SERPL-MCNC: 8.9 MG/DL — SIGNIFICANT CHANGE UP (ref 8.4–10.5)
CALCIUM SERPL-MCNC: 9.2 MG/DL — SIGNIFICANT CHANGE UP (ref 8.4–10.5)
CHLORIDE SERPL-SCNC: 92 MMOL/L — LOW (ref 96–108)
CHLORIDE SERPL-SCNC: 95 MMOL/L — LOW (ref 96–108)
CO2 SERPL-SCNC: 23 MMOL/L — SIGNIFICANT CHANGE UP (ref 22–31)
CO2 SERPL-SCNC: 23 MMOL/L — SIGNIFICANT CHANGE UP (ref 22–31)
COLOR SPEC: YELLOW — SIGNIFICANT CHANGE UP
COMMENT - URINE: SIGNIFICANT CHANGE UP
CREAT SERPL-MCNC: 0.82 MG/DL — SIGNIFICANT CHANGE UP (ref 0.5–1.3)
CREAT SERPL-MCNC: 1 MG/DL — SIGNIFICANT CHANGE UP (ref 0.5–1.3)
DIFF PNL FLD: NEGATIVE — SIGNIFICANT CHANGE UP
EGFR: 77 ML/MIN/1.73M2 — SIGNIFICANT CHANGE UP
EGFR: 90 ML/MIN/1.73M2 — SIGNIFICANT CHANGE UP
EOSINOPHIL # BLD AUTO: 0.09 K/UL — SIGNIFICANT CHANGE UP (ref 0–0.5)
EOSINOPHIL NFR BLD AUTO: 0.9 % — SIGNIFICANT CHANGE UP (ref 0–6)
EPI CELLS # UR: 1 /HPF — SIGNIFICANT CHANGE UP
GLUCOSE SERPL-MCNC: 103 MG/DL — HIGH (ref 70–99)
GLUCOSE SERPL-MCNC: 130 MG/DL — HIGH (ref 70–99)
GLUCOSE UR QL: NEGATIVE — SIGNIFICANT CHANGE UP
HCT VFR BLD CALC: 39.3 % — SIGNIFICANT CHANGE UP (ref 39–50)
HGB BLD-MCNC: 12.8 G/DL — LOW (ref 13–17)
HYALINE CASTS # UR AUTO: 1 /LPF — SIGNIFICANT CHANGE UP (ref 0–2)
KETONES UR-MCNC: ABNORMAL
LEUKOCYTE ESTERASE UR-ACNC: NEGATIVE — SIGNIFICANT CHANGE UP
LYMPHOCYTES # BLD AUTO: 0.17 K/UL — LOW (ref 1–3.3)
LYMPHOCYTES # BLD AUTO: 1.7 % — LOW (ref 13–44)
MAGNESIUM SERPL-MCNC: 1.9 MG/DL — SIGNIFICANT CHANGE UP (ref 1.6–2.6)
MAGNESIUM SERPL-MCNC: 2 MG/DL — SIGNIFICANT CHANGE UP (ref 1.6–2.6)
MANUAL SMEAR VERIFICATION: SIGNIFICANT CHANGE UP
MCHC RBC-ENTMCNC: 25 PG — LOW (ref 27–34)
MCHC RBC-ENTMCNC: 32.6 GM/DL — SIGNIFICANT CHANGE UP (ref 32–36)
MCV RBC AUTO: 76.9 FL — LOW (ref 80–100)
MICROCYTES BLD QL: SLIGHT — SIGNIFICANT CHANGE UP
MONOCYTES # BLD AUTO: 0.69 K/UL — SIGNIFICANT CHANGE UP (ref 0–0.9)
MONOCYTES NFR BLD AUTO: 6.9 % — SIGNIFICANT CHANGE UP (ref 2–14)
NEUTROPHILS # BLD AUTO: 9 K/UL — HIGH (ref 1.8–7.4)
NEUTROPHILS NFR BLD AUTO: 89.6 % — HIGH (ref 43–77)
NITRITE UR-MCNC: NEGATIVE — SIGNIFICANT CHANGE UP
OVALOCYTES BLD QL SMEAR: SLIGHT — SIGNIFICANT CHANGE UP
PH UR: 6 — SIGNIFICANT CHANGE UP (ref 5–8)
PHOSPHATE SERPL-MCNC: 2.3 MG/DL — LOW (ref 2.5–4.5)
PHOSPHATE SERPL-MCNC: 3.2 MG/DL — SIGNIFICANT CHANGE UP (ref 2.5–4.5)
PLAT MORPH BLD: NORMAL — SIGNIFICANT CHANGE UP
PLATELET # BLD AUTO: 377 K/UL — SIGNIFICANT CHANGE UP (ref 150–400)
PLATELET COUNT - ESTIMATE: NORMAL — SIGNIFICANT CHANGE UP
POIKILOCYTOSIS BLD QL AUTO: SLIGHT — SIGNIFICANT CHANGE UP
POTASSIUM SERPL-MCNC: 4.2 MMOL/L — SIGNIFICANT CHANGE UP (ref 3.5–5.3)
POTASSIUM SERPL-MCNC: 4.4 MMOL/L — SIGNIFICANT CHANGE UP (ref 3.5–5.3)
POTASSIUM SERPL-SCNC: 4.2 MMOL/L — SIGNIFICANT CHANGE UP (ref 3.5–5.3)
POTASSIUM SERPL-SCNC: 4.4 MMOL/L — SIGNIFICANT CHANGE UP (ref 3.5–5.3)
PROT SERPL-MCNC: 8 G/DL — SIGNIFICANT CHANGE UP (ref 6–8.3)
PROT UR-MCNC: ABNORMAL
RBC # BLD: 5.11 M/UL — SIGNIFICANT CHANGE UP (ref 4.2–5.8)
RBC # FLD: 16.1 % — HIGH (ref 10.3–14.5)
RBC BLD AUTO: SIGNIFICANT CHANGE UP
RBC CASTS # UR COMP ASSIST: 3 /HPF — SIGNIFICANT CHANGE UP (ref 0–4)
SODIUM SERPL-SCNC: 129 MMOL/L — LOW (ref 135–145)
SODIUM SERPL-SCNC: 130 MMOL/L — LOW (ref 135–145)
SP GR SPEC: 1.03 — HIGH (ref 1.01–1.02)
SPHEROCYTES BLD QL SMEAR: SLIGHT — SIGNIFICANT CHANGE UP
TSH SERPL-MCNC: 0.82 UIU/ML — SIGNIFICANT CHANGE UP (ref 0.27–4.2)
UROBILINOGEN FLD QL: NEGATIVE — SIGNIFICANT CHANGE UP
WBC # BLD: 10.05 K/UL — SIGNIFICANT CHANGE UP (ref 3.8–10.5)
WBC # FLD AUTO: 10.05 K/UL — SIGNIFICANT CHANGE UP (ref 3.8–10.5)
WBC UR QL: 5 /HPF — SIGNIFICANT CHANGE UP (ref 0–5)

## 2023-07-17 PROCEDURE — 71045 X-RAY EXAM CHEST 1 VIEW: CPT | Mod: 26

## 2023-07-17 PROCEDURE — 70450 CT HEAD/BRAIN W/O DYE: CPT | Mod: 26,MA

## 2023-07-17 PROCEDURE — 99223 1ST HOSP IP/OBS HIGH 75: CPT | Mod: GC

## 2023-07-17 PROCEDURE — 99222 1ST HOSP IP/OBS MODERATE 55: CPT

## 2023-07-17 RX ORDER — ALBUTEROL 90 UG/1
2 AEROSOL, METERED ORAL
Qty: 0 | Refills: 0 | DISCHARGE

## 2023-07-17 RX ORDER — ACETAMINOPHEN 500 MG
650 TABLET ORAL EVERY 6 HOURS
Refills: 0 | Status: DISCONTINUED | OUTPATIENT
Start: 2023-07-17 | End: 2023-07-22

## 2023-07-17 RX ORDER — BUDESONIDE AND FORMOTEROL FUMARATE DIHYDRATE 160; 4.5 UG/1; UG/1
2 AEROSOL RESPIRATORY (INHALATION)
Refills: 0 | Status: DISCONTINUED | OUTPATIENT
Start: 2023-07-17 | End: 2023-07-22

## 2023-07-17 RX ORDER — LANOLIN ALCOHOL/MO/W.PET/CERES
3 CREAM (GRAM) TOPICAL AT BEDTIME
Refills: 0 | Status: DISCONTINUED | OUTPATIENT
Start: 2023-07-17 | End: 2023-07-22

## 2023-07-17 RX ORDER — HEPARIN SODIUM 5000 [USP'U]/ML
5000 INJECTION INTRAVENOUS; SUBCUTANEOUS EVERY 8 HOURS
Refills: 0 | Status: DISCONTINUED | OUTPATIENT
Start: 2023-07-17 | End: 2023-07-22

## 2023-07-17 RX ORDER — ACETAMINOPHEN 500 MG
650 TABLET ORAL ONCE
Refills: 0 | Status: COMPLETED | OUTPATIENT
Start: 2023-07-17 | End: 2023-07-17

## 2023-07-17 RX ORDER — FLUTICASONE FUROATE AND VILANTEROL TRIFENATATE 100; 25 UG/1; UG/1
1 POWDER RESPIRATORY (INHALATION)
Qty: 0 | Refills: 0 | DISCHARGE

## 2023-07-17 RX ORDER — FLUTICASONE PROPIONATE 50 MCG
2 SPRAY, SUSPENSION NASAL
Refills: 0 | Status: DISCONTINUED | OUTPATIENT
Start: 2023-07-17 | End: 2023-07-22

## 2023-07-17 RX ORDER — MONTELUKAST 4 MG/1
1 TABLET, CHEWABLE ORAL
Qty: 0 | Refills: 0 | DISCHARGE

## 2023-07-17 RX ORDER — FLUTICASONE PROPIONATE 50 MCG
2 SPRAY, SUSPENSION NASAL
Qty: 0 | Refills: 0 | DISCHARGE

## 2023-07-17 RX ORDER — LACOSAMIDE 50 MG/1
200 TABLET ORAL
Refills: 0 | Status: DISCONTINUED | OUTPATIENT
Start: 2023-07-17 | End: 2023-07-22

## 2023-07-17 RX ORDER — ONDANSETRON 8 MG/1
4 TABLET, FILM COATED ORAL EVERY 8 HOURS
Refills: 0 | Status: DISCONTINUED | OUTPATIENT
Start: 2023-07-17 | End: 2023-07-22

## 2023-07-17 RX ORDER — LEVETIRACETAM 250 MG/1
500 TABLET, FILM COATED ORAL
Refills: 0 | Status: DISCONTINUED | OUTPATIENT
Start: 2023-07-17 | End: 2023-07-22

## 2023-07-17 RX ORDER — MONTELUKAST 4 MG/1
10 TABLET, CHEWABLE ORAL DAILY
Refills: 0 | Status: DISCONTINUED | OUTPATIENT
Start: 2023-07-17 | End: 2023-07-22

## 2023-07-17 RX ORDER — ACETAMINOPHEN 500 MG
975 TABLET ORAL ONCE
Refills: 0 | Status: COMPLETED | OUTPATIENT
Start: 2023-07-17 | End: 2023-07-17

## 2023-07-17 RX ADMIN — Medication 650 MILLIGRAM(S): at 16:42

## 2023-07-17 RX ADMIN — Medication 3 MILLIGRAM(S): at 22:44

## 2023-07-17 RX ADMIN — Medication 975 MILLIGRAM(S): at 17:09

## 2023-07-17 RX ADMIN — LEVETIRACETAM 500 MILLIGRAM(S): 250 TABLET, FILM COATED ORAL at 18:51

## 2023-07-17 RX ADMIN — Medication 650 MILLIGRAM(S): at 22:44

## 2023-07-17 RX ADMIN — Medication 650 MILLIGRAM(S): at 17:09

## 2023-07-17 RX ADMIN — Medication 2 SPRAY(S): at 18:51

## 2023-07-17 RX ADMIN — Medication 975 MILLIGRAM(S): at 03:51

## 2023-07-17 RX ADMIN — Medication 1 TABLET(S): at 18:51

## 2023-07-17 RX ADMIN — LACOSAMIDE 200 MILLIGRAM(S): 50 TABLET ORAL at 22:45

## 2023-07-17 RX ADMIN — HEPARIN SODIUM 5000 UNIT(S): 5000 INJECTION INTRAVENOUS; SUBCUTANEOUS at 22:44

## 2023-07-17 RX ADMIN — MONTELUKAST 10 MILLIGRAM(S): 4 TABLET, CHEWABLE ORAL at 18:51

## 2023-07-17 RX ADMIN — BUDESONIDE AND FORMOTEROL FUMARATE DIHYDRATE 2 PUFF(S): 160; 4.5 AEROSOL RESPIRATORY (INHALATION) at 18:51

## 2023-07-17 NOTE — ED PROVIDER NOTE - PHYSICAL EXAMINATION
Const: not in acute distress  Eyes: no conjunctival injection  HEENT: Head NCAT, Moist MM.  Neck: Trachea midline.   CVS: +S1/S2, Peripheral pulses 2+ and equal in all extremities.  RESP: Unlabored respiratory effort. Clear to auscultation bilaterally.  GI: Nontender/Nondistended, No CVA tenderness b/l.   MSK: Normocephalic/Atraumatic, No Lower Extremities edema b/l.   Skin: Intact.   Neuro: CNs II-XII grossly intact. Motor & Sensation grossly intact. Instability when standing up  Psych: Awake, Alert, & Cooperative

## 2023-07-17 NOTE — H&P ADULT - ASSESSMENT
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 He presents with sudden generalized weakness, now improving. His exam is negative for any focal deficits. He was seen by neurology, symptoms thought to be d/t keppra and recommended decrease his keppra dose.

## 2023-07-17 NOTE — H&P ADULT - PROBLEM SELECTOR PROBLEM 2
Patient presented today for a void trial and has a raised rash in groin area    He is questioning what he can put on this Please advise The pharmacy, [facility] in system is correct History of seizure

## 2023-07-17 NOTE — H&P ADULT - PROBLEM SELECTOR PLAN 5
- takes breo ellipta at home, convert to Symbicort BID while admitted  - continue home montelukast 10mg daily

## 2023-07-17 NOTE — ED ADULT NURSE NOTE - NSFALLRISKINTERV_ED_ALL_ED

## 2023-07-17 NOTE — H&P ADULT - NSHPLABSRESULTS_GEN_ALL_CORE
LABS:                         12.8   10.05 )-----------( 377      ( 2023 01:19 )             39.3     -    129<L>  |  92<L>  |  17  ----------------------------<  103<H>  4.4   |  23  |  1.00    Ca    9.2      2023 01:19  Phos  3.2       Mg     1.9         TPro  8.0  /  Alb  4.1  /  TBili  0.5  /  DBili  x   /  AST  57<H>  /  ALT  44  /  AlkPhos  85        Urinalysis Basic - ( 2023 05:52 )    Color: Yellow / Appearance: Clear / S.031 / pH: x  Gluc: x / Ketone: Small  / Bili: Negative / Urobili: Negative   Blood: x / Protein: 30 mg/dL / Nitrite: Negative   Leuk Esterase: Negative / RBC: 3 /hpf / WBC 5 /HPF   Sq Epi: x / Non Sq Epi: x / Bacteria: Moderate              Records reviewed from prior hospitalization.  Labs reviewed remarkable for - CBC unremarkable. Hyponatremic to 129. U/a negative for UTI.  EKG personally reviewed   CXR personally reviewed- clear lungs LABS:                         12.8   10.05 )-----------( 377      ( 2023 01:19 )             39.3     -    129<L>  |  92<L>  |  17  ----------------------------<  103<H>  4.4   |  23  |  1.00    Ca    9.2      2023 01:19  Phos  3.2       Mg     1.9         TPro  8.0  /  Alb  4.1  /  TBili  0.5  /  DBili  x   /  AST  57<H>  /  ALT  44  /  AlkPhos  85        Urinalysis Basic - ( 2023 05:52 )    Color: Yellow / Appearance: Clear / S.031 / pH: x  Gluc: x / Ketone: Small  / Bili: Negative / Urobili: Negative   Blood: x / Protein: 30 mg/dL / Nitrite: Negative   Leuk Esterase: Negative / RBC: 3 /hpf / WBC 5 /HPF   Sq Epi: x / Non Sq Epi: x / Bacteria: Moderate              Records reviewed from prior hospitalization.  Labs reviewed remarkable for - CBC unremarkable. Hyponatremic to 129. U/a negative for UTI.  CXR personally reviewed- clear lungs

## 2023-07-17 NOTE — ED PROVIDER NOTE - OBJECTIVE STATEMENT
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  Patient states that he typically can ambulate.  However patient has been unable to walk for the past day.  Patient was discharged on July 11.  Was to follow-up with neurology but has not since.  Patient is pending an MRI result per them.  Denies fever, nausea abdominal abdominal pain, dysuria.  Wife at bedside does note that patient was sleeping excessively yesterday.  Wife also reports decreased p.o. intake since discharge. 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  Patient states that he typically can ambulate.  However patient has been unable to walk for the past day.  Patient was discharged on July 11.  Was to follow-up with neurology but has not since.  Patient is pending an MRI result per them.  Denies fever, nausea abdominal pain, dysuria.  Wife at bedside does note that patient was sleeping excessively yesterday.  Wife also reports decreased p.o. intake since discharge. 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  Patient states that he typically can ambulate at baseline.  However patient has been unable to walk for the past day.  Patient was discharged on July 11.  Was to follow-up with neurology but has not since.  Patient is pending an MRI result per them.  Denies fever, nausea abdominal pain, dysuria.  Wife at bedside does note that patient was sleeping excessively yesterday.  Wife also reports decreased p.o. intake since discharge.

## 2023-07-17 NOTE — H&P ADULT - PROBLEM SELECTOR PLAN 4
- pt is s/p drainage of right elbow ?pustule at a dermatologist office  - continue course of outpatient antibiotics, bactrim BID

## 2023-07-17 NOTE — ED PROVIDER NOTE - ATTENDING CONTRIBUTION TO CARE
Lauro León MD:  I personally saw the patient and performed a substantive portion of the visit including all aspects of the medical decision making.    MDM: 77-year-old male with history as seen in HPI above, who presents with bilateral lower extremity weakness and difficulty with ambulating for the last 1 day.  Patient with recently discharged on 7/11.  Denies fevers, chills, back pain, neck pain weight loss, pain worse at night, urinary retention, incontinence of bowel or bladder, saddle anesthesia, numbness, and no recent trauma or falls.    On examination, patient with stable vitals, cardiac RRR, lungs CTAB, abdomen soft nontender.  C/T/L-spine with normal range of motion, no midline tenderness.  4-5 strength of bilateral lower extremities proximally, but 5 of 5 distally including ankle dorsi and plantarflexion.  Normal sensation throughout.  No hyperreflexia.  Negative Babinski.  Vascular examination with brisk cap refill and equal pulses.    Will obtain labs to evaluate for hematologic disorder, metabolic derangements, hepatic and renal function.  Will obtain chest x-ray to evaluate for acute cardiopulmonary pathology.  Will obtain CT Head to evaluate for acute intracranial pathology.    Labs reviewed, patient found to have hyponatremia 129.    My independent interpretation of the chest x-ray images shows no focal consolidation.   More details to be seen in the official radiologist read.  CT imaging shows no ICH.    Differential includes but is not limited to: See above    Patient with new problems requiring additional work-up and treatment, following orders: see above  Discussed case with: N/A  Obtained and reviewed external records: Discharge note from 7/11/2023  Additional history obtained from: Wife at bedside  Chronic conditions and social determinants of health affecting care: See above  Consideration of admission Lauro León MD:  I personally saw the patient and performed a substantive portion of the visit including all aspects of the medical decision making.    MDM: 77-year-old male with history as seen in HPI above, who presents with bilateral lower extremity weakness and difficulty with ambulating for the last 1 day.  Patient with recently discharged on 7/11.  Denies fevers, chills, back pain, neck pain weight loss, pain worse at night, urinary retention, incontinence of bowel or bladder, saddle anesthesia, numbness, and no recent trauma or falls.    On examination, patient with stable vitals, cardiac RRR, lungs CTAB, abdomen soft nontender.  C/T/L-spine with normal range of motion, no midline tenderness.  4-5 strength of bilateral lower extremities proximally, but 5 of 5 distally including ankle dorsi and plantarflexion.  Normal sensation throughout.  No hyperreflexia.  Negative Babinski.  Vascular examination with brisk cap refill and equal pulses.    Will obtain labs to evaluate for hematologic disorder, metabolic derangements, hepatic and renal function.  Will obtain chest x-ray to evaluate for acute cardiopulmonary pathology.  Will obtain CT Head to evaluate for acute intracranial pathology.    Labs reviewed, patient found to have hyponatremia 129.    My independent interpretation of the chest x-ray images shows no focal consolidation.   More details to be seen in the official radiologist read.  CT imaging shows no ICH.    Patient found to have urinary tract infection, will treat with IV antibiotics.  Given patient's generalized weakness which is worse in the lower extremities, and ambulatory dysfunction, will require admission.  Patient was seen by neurology team who agree with plan of care, recommended medicine admission.    Differential includes but is not limited to: See above    Patient with new problems requiring additional work-up and treatment, following orders: see above  Discussed case with: N/A  Obtained and reviewed external records: Discharge note from 7/11/2023  Additional history obtained from: Wife at bedside  Chronic conditions and social determinants of health affecting care: See above  Consideration of admission

## 2023-07-17 NOTE — H&P ADULT - PROBLEM SELECTOR PLAN 3
Moderate hyponatremia, likely contributing to the patient's generalized weakness  - repeat BMP now  - will consider giving fluid bolus if pt continues to be hyponatremic

## 2023-07-17 NOTE — H&P ADULT - PROBLEM SELECTOR PLAN 1
Likely secondary to keppra toxicity. Keppra dose was recently increased from 500mg BID to 750mg BID during recent hospitalization in July   - will decrease keppra down to 500mg BID  - continue home lacosamide 200mg BID  - continue outpatient treatment of elbow ?abscess/skin infection with bactrim  - U/A negative for UTI  - PT consult  - fall precaution

## 2023-07-17 NOTE — ED ADULT NURSE NOTE - OBJECTIVE STATEMENT
78YO male with PMH of heart murmur, moderate COPD, and asthma presenting with complaints of weakness. pt states having difficulty walking  Pt Axox4, gross neuro intact, PERRL mm. Lungs clear throughout bilateral, respirations even, & non-labored. radial pulses strong and equal bilaterally. Skin warm, & dry. Pt placed in position of comfort. Pt educated on call bell system and provided call bell. Bed in lowest position, wheels locked, appropriate side rails raised. wife at bedside. Pt denies needs at this time. 76YO male with PMH of heart murmur, seizures and asthma presenting with complaints of weakness. pt states having difficulty walking for the past day, is normally able to ambulate with no difficulty, pt is also endorsing decrease po intake.    Pt Axox4, gross neuro intact, PERRL mm. Lungs clear throughout bilateral, respirations even, & non-labored. radial pulses strong and equal bilaterally. Skin warm, & dry. Pt placed in position of comfort. Pt educated on call bell system and provided call bell. Bed in lowest position, wheels locked, appropriate side rails raised. wife at bedside. Pt denies needs at this time.

## 2023-07-17 NOTE — ED ADULT NURSE REASSESSMENT NOTE - NS ED NURSE REASSESS COMMENT FT1
Received report from Larry POTTS. Pt. A&Ox3, pt. is sitting up and does not appear to be in any acute distress at this time- nonlabored breathing noted. Pt. is admitted and awaiting bed assignment. Pt. ambulated without difficulty to the bathroom with one assist. Safety and comfort provided.

## 2023-07-17 NOTE — H&P ADULT - PROBLEM SELECTOR PLAN 2
- as above, decrease keppra to 500 mg BID  and continue home dose lacosamide 200 mg BID  - pt states he has outpatient appointment with his neurologist on Wed 7/19

## 2023-07-17 NOTE — ED PROVIDER NOTE - CLINICAL SUMMARY MEDICAL DECISION MAKING FREE TEXT BOX
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  Vitals nonactionable.  Physical exam with heart regular rate and rhythm, lungs clear to auscultation, abdomen soft nondistended nontender.  Neuro grossly intact, instability when standing up.  The differential diagnosis includes but is not limited to infection causes (including pneumonia versus UTI), electrolyte derangement, intracranial pathology, deconditioning.  We will get labs, TSH, UA/UC, chest x-ray, CT head.  If all negative, likely to be admitted for PT/OT.

## 2023-07-17 NOTE — H&P ADULT - PROBLEM SELECTOR PROBLEM 6
[FreeTextEntry1] : 1.  A. fib with recent admission for decompensated diastolic heart failure\par Euvolemic and rate controlled on exam\par Orthopnea is at her baseline\par We will continue beta-blocker and Eliquis for anticoagulation at present dose\par Her potassium and renal function are stable on current dose of furosemide\par She will weigh herself daily\par Has cardiology follow-up\par Check TSH and T4\par \par 2.  Type 2 diabetes\par A1c while inpatient is at goal\par Next at her CPE in November\par \par 3.  Microcytosis\par Check CBC, iron panel, hemoglobin electrophoresis given microcytosis without anemia\par \par Flu shot administersed today\par Follow up in 1 month for CPE
Prophylactic measure

## 2023-07-17 NOTE — H&P ADULT - HISTORY OF PRESENT ILLNESS
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 EEG showed focal dysfunction in the left temporal region, mild nonspecific diffuse cerebral dysfunction, and no epileptiform discharge or seizures were recorded.    ED interventions: acetaminophen 975mg PO. Neurology consulted. 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     The patient was recently admitted to EMU from 7/6 - 7/11 for encephalopathy. EEG showed focal dysfunction in the left temporal region, mild nonspecific diffuse cerebral dysfunction, and no epileptiform discharge or seizures were recorded.    He was discharged with home physical therapy. Pt states he was in his normal state of health, ambulating without assistive devices as normal. However on Saturday wife noted that the patient felt weaker compared to his baseline. The day of presentation, the patient states he was unable to get up from the toilet. Wife had to help the patient up to the bed. Pt later on was unable to get up from the bed on his own. Pt endorsed headache but other    ED interventions: acetaminophen 975mg PO. Neurology consulted. 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     The patient was recently admitted to EMU from 7/6 - 7/11 for encephalopathy. EEG showed focal dysfunction in the left temporal region, mild nonspecific diffuse cerebral dysfunction, and no epileptiform discharge or seizures were recorded.    He was discharged with home physical therapy. Pt states he was in his normal state of health, ambulating without assistive devices as normal. However on Saturday wife noted that the patient felt weaker compared to his baseline. The day of presentation, the patient states he was unable to get up from the toilet. Wife had to help the patient up to the bed. Pt later on was unable to get up from the bed on his own. Pt endorsed headache but otherwise no feelings of disorientation or confusion. No loss of consciousness. No back pain. No numbness or tingling.    ED interventions: acetaminophen 975mg PO. Neurology consulted.

## 2023-07-17 NOTE — CONSULT NOTE ADULT - SUBJECTIVE AND OBJECTIVE BOX
Neurology - Consult Note    -  Spectra: [id number] ([facility]), [id number] ([facility])  -    HPI: Patient VANESSA CASTRO is a 77y (1945) man with a PMHx significant for focal seizures on Keppra and Vimpat follows with MAZIN Lu amygdala/hippocampus lesion (concern for low grade glioma per NS)  presenting with difficulty ambulating. Patient reports an increased amount of day time sleepiness as well. Patient denies dizziness or lightheadedness. He says his gait is off balanced. Yesterday he could not even get out of bed. Recent admission to EMU for AMS. EEG during that admission only showed focal dysfunction in the left temporal region, mild nonspecific diffuse cerebral dysfunction, but no epileptiform discharge or seizures were recorded. Patient's Keppra was increased to 750 mg BID, Vimpat was continued at 200 mg BID. Patient says since his admission he has been sleeping more. Denies unilateral weakness, numbness, tingling vision changes, falls. Admits to occasional generalized HA that improve with tylenol. Denies light or sound sensitivity, N/V with HAs.     Review of Systems:  refer to hpi     Allergies: No Known Allergies    PMHx/PSHx/Family Hx: As above, otherwise see below   Asthma  Sinusitis  Focal seizures    Social Hx:  No current use of tobacco, alcohol, or illicit drugs    Medications:  MEDICATIONS  (STANDING):  MEDICATIONS  (PRN):    Vitals:  T(C): 36.9 (07-17-23 @ 04:33), Max: 37 (07-17-23 @ 00:59)  HR: 68 (07-17-23 @ 04:33) (68 - 99)  BP: 115/65 (07-17-23 @ 04:33) (115/65 - 134/78)  RR: 18 (07-17-23 @ 04:33) (16 - 18)  SpO2: 94% (07-17-23 @ 04:33) (94% - 100%)    Physical Examination:  General - NAD    Neurologic Exam:  Mental status - Awake, Alert, Oriented to person, place, and time. Speech fluent, repetition and naming intact. Follows simple  commands.     Cranial nerves - PERRLA, VFF, EOMI, face sensation (V1-V3) intact b/l, facial strength intact without asymmetry b/l, hearing intact b/l, palate with symmetric elevation, trapezius  5/5 strength b/l, tongue midline on protrusion with full lateral movement    Motor - Normal bulk and tone throughout. No pronator drift.  Strength testing            Deltoid      Biceps      Triceps        R            5                 5               5                     5              L             5                 5               5                     5                                      Hip Flexion    Knee Flexion    Knee Extension    Dorsiflexion    Plantar Flexion  R              5                           5                       5                           5                            5                         L              5                           5                        5                           5                            5                            Sensation - Light touch intact throughout    DTR's -             Biceps      Triceps     Brachioradialis      Patellar    Ankle    Toes/plantar response  R             2+             2+                  2+                       2+            2+                 Down  L              2+             2+                 2+                        2+           2+                 Down    Coordination - Finger to Nose intact b/l. LUE kinetic tremor noted    Gait and station - slow cautious narrow shuffling gait. Romberg (+)    Labs:                        12.8   10.05 )-----------( 377      ( 17 Jul 2023 01:19 )             39.3     07-17    129<L>  |  92<L>  |  17  ----------------------------<  103<H>  4.4   |  23  |  1.00    Ca    9.2      17 Jul 2023 01:19  Phos  3.2     07-17  Mg     1.9     07-17    TPro  8.0  /  Alb  4.1  /  TBili  0.5  /  DBili  x   /  AST  57<H>  /  ALT  44  /  AlkPhos  85  07-17    CAPILLARY BLOOD GLUCOSE      POCT Blood Glucose.: 81 mg/dL (17 Jul 2023 01:28)    LIVER FUNCTIONS - ( 17 Jul 2023 01:19 )  Alb: 4.1 g/dL / Pro: 8.0 g/dL / ALK PHOS: 85 U/L / ALT: 44 U/L / AST: 57 U/L / GGT: x             Radiology:  CT Head No Cont:  (17 Jul 2023 04:00) unremarkable.      Neurology - Consult Note    -  Spectra: [id number] ([facility]), [id number] ([facility])  -    HPI: Patient VANESSA CASTRO is a 77y (1945) man with a PMHx significant for focal seizures on Keppra and Vimpat follows with MAZIN Lu amygdala/hippocampus lesion (concern for low grade glioma per NSGY)  presenting with difficulty ambulating. Patient reports an increased amount of day time sleepiness as well. Patient denies dizziness or lightheadedness. He says his gait is off balanced. Yesterday he could not even get out of bed. Recent admission to EMU for AMS. EEG during that admission only showed focal dysfunction in the left temporal region, mild nonspecific diffuse cerebral dysfunction, but no epileptiform discharge or seizures were recorded. Patient's Keppra was increased to 750 mg BID, Vimpat was continued at 200 mg BID. Patient says since his admission he has been sleeping more. Denies unilateral weakness, numbness, tingling vision changes, falls. Admits to occasional generalized HA that improve with tylenol. Denies light or sound sensitivity, N/V with HAs.     Review of Systems:  refer to hpi     Allergies: No Known Allergies    PMHx/PSHx/Family Hx: As above, otherwise see below   Asthma  Sinusitis  Focal seizures    Social Hx:  No current use of tobacco, alcohol, or illicit drugs    Medications:  MEDICATIONS  (STANDING):  MEDICATIONS  (PRN):    Vitals:  T(C): 36.9 (07-17-23 @ 04:33), Max: 37 (07-17-23 @ 00:59)  HR: 68 (07-17-23 @ 04:33) (68 - 99)  BP: 115/65 (07-17-23 @ 04:33) (115/65 - 134/78)  RR: 18 (07-17-23 @ 04:33) (16 - 18)  SpO2: 94% (07-17-23 @ 04:33) (94% - 100%)    Physical Examination:  General - NAD  CV - Peripheral pulses palpable, no edema  Eyes - Fundoscopy not well visualized    Neurologic Exam:  Mental status - Awake, Alert, Oriented to person, place, and time. Speech fluent, repetition and naming intact. Follows simple  commands. Attention/concentration, recent and remote memory, and fund of knowledge intact    Cranial nerves - PERRLA, VFF, EOMI, face sensation (V1-V3) intact b/l, facial strength intact without asymmetry b/l, hearing intact b/l, palate with symmetric elevation, trapezius  5/5 strength b/l, tongue midline on protrusion with full lateral movement    Motor - Normal bulk and tone throughout. No pronator drift.  Strength testing            Deltoid      Biceps      Triceps        R            5                 5               5                     5              L             5                 5               5                     5                                      Hip Flexion    Knee Flexion    Knee Extension    Dorsiflexion    Plantar Flexion  R              5                           5                       5                           5                            5                         L              5                           5                        5                           5                            5                            Sensation - Light touch intact throughout    DTR's -             Biceps      Triceps     Brachioradialis      Patellar    Ankle    Toes/plantar response  R             2+             2+                  2+                       2+            2+                 Down  L              2+             2+                 2+                        2+           2+                 Down    Coordination - Finger to Nose intact b/l. LUE kinetic tremor noted    Gait and station - slow cautious narrow shuffling gait. Romberg (+)    Labs:                        12.8   10.05 )-----------( 377      ( 17 Jul 2023 01:19 )             39.3     07-17    129<L>  |  92<L>  |  17  ----------------------------<  103<H>  4.4   |  23  |  1.00    Ca    9.2      17 Jul 2023 01:19  Phos  3.2     07-17  Mg     1.9     07-17    TPro  8.0  /  Alb  4.1  /  TBili  0.5  /  DBili  x   /  AST  57<H>  /  ALT  44  /  AlkPhos  85  07-17    CAPILLARY BLOOD GLUCOSE      POCT Blood Glucose.: 81 mg/dL (17 Jul 2023 01:28)    LIVER FUNCTIONS - ( 17 Jul 2023 01:19 )  Alb: 4.1 g/dL / Pro: 8.0 g/dL / ALK PHOS: 85 U/L / ALT: 44 U/L / AST: 57 U/L / GGT: x             Radiology:  CT Head No Cont:  (17 Jul 2023 04:00) unremarkable.

## 2023-07-17 NOTE — CONSULT NOTE ADULT - ASSESSMENT
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 Patient reports an increased amount of day time sleepiness as well. Patient denies dizziness or lightheadedness. He says his gait is off balanced. Yesterday he could not even get out of bed. Recent admission to EMU for AMS. EEG during that admission only showed focal dysfunction in the left temporal region, mild nonspecific diffuse cerebral dysfunction, but no epileptiform discharge or seizures were recorded. Patient's keppra was increased to 750 mg BID, Vimpat was continued at 200 mg BID. Patient says since his admission he has been sleeping more. Denies unilateral weakness, numbness, tingling vision changes, falls. Exam notable for slow cautious narrow shuffling gait & Romberg (+). Ct head unremarkable. labs pertinent for sodium 129.      Impression: increased lethargy 2/2 possibly increased keppra dose. unclear reason for gait difficulty      Plan  decrease keppra form 750 mg bid to 500mg bid (extended release)  continue vimpat 200mg bid  follow up with Dr. Jovanny Sweet as an outpatient  consider PT evaluation    Case discussed with Epilepsy attending Dr. Jovanny Sweet

## 2023-07-17 NOTE — H&P ADULT - NSHPPHYSICALEXAM_GEN_ALL_CORE
Vital Signs Last 24 Hrs  T(C): 36.7 (17 Jul 2023 12:16), Max: 37 (17 Jul 2023 00:59)  T(F): 98 (17 Jul 2023 12:16), Max: 98.6 (17 Jul 2023 00:59)  HR: 66 (17 Jul 2023 12:16) (60 - 99)  BP: 124/69 (17 Jul 2023 12:16) (103/72 - 134/78)  BP(mean): 80 (17 Jul 2023 04:33) (80 - 85)  RR: 16 (17 Jul 2023 12:16) (15 - 18)  SpO2: 96% (17 Jul 2023 12:16) (94% - 100%)    Parameters below as of 17 Jul 2023 12:16  Patient On (Oxygen Delivery Method): room air

## 2023-07-17 NOTE — ED ADULT NURSE REASSESSMENT NOTE - NS ED NURSE REASSESS COMMENT FT1
Report received from Carrol POTTS. Patient seen sleeping in bed, easily arousable. Patient has no complaints at this time. Assisted patient to stand at side of bed to urinate in urinal. Patient made comfortable, safety and comfort provided.

## 2023-07-18 DIAGNOSIS — R50.9 FEVER, UNSPECIFIED: ICD-10-CM

## 2023-07-18 LAB
BASOPHILS # BLD AUTO: 0.06 K/UL — SIGNIFICANT CHANGE UP (ref 0–0.2)
BASOPHILS NFR BLD AUTO: 0.8 % — SIGNIFICANT CHANGE UP (ref 0–2)
CULTURE RESULTS: NO GROWTH — SIGNIFICANT CHANGE UP
EOSINOPHIL # BLD AUTO: 0.23 K/UL — SIGNIFICANT CHANGE UP (ref 0–0.5)
EOSINOPHIL NFR BLD AUTO: 3.1 % — SIGNIFICANT CHANGE UP (ref 0–6)
HCT VFR BLD CALC: 34 % — LOW (ref 39–50)
HGB BLD-MCNC: 10.8 G/DL — LOW (ref 13–17)
IMM GRANULOCYTES NFR BLD AUTO: 0.5 % — SIGNIFICANT CHANGE UP (ref 0–0.9)
LYMPHOCYTES # BLD AUTO: 0.26 K/UL — LOW (ref 1–3.3)
LYMPHOCYTES # BLD AUTO: 3.5 % — LOW (ref 13–44)
MCHC RBC-ENTMCNC: 24.8 PG — LOW (ref 27–34)
MCHC RBC-ENTMCNC: 31.8 GM/DL — LOW (ref 32–36)
MCV RBC AUTO: 78.2 FL — LOW (ref 80–100)
MONOCYTES # BLD AUTO: 0.46 K/UL — SIGNIFICANT CHANGE UP (ref 0–0.9)
MONOCYTES NFR BLD AUTO: 6.2 % — SIGNIFICANT CHANGE UP (ref 2–14)
NEUTROPHILS # BLD AUTO: 6.34 K/UL — SIGNIFICANT CHANGE UP (ref 1.8–7.4)
NEUTROPHILS NFR BLD AUTO: 85.9 % — HIGH (ref 43–77)
NRBC # BLD: 0 /100 WBCS — SIGNIFICANT CHANGE UP (ref 0–0)
PLATELET # BLD AUTO: 302 K/UL — SIGNIFICANT CHANGE UP (ref 150–400)
RBC # BLD: 4.35 M/UL — SIGNIFICANT CHANGE UP (ref 4.2–5.8)
RBC # FLD: 15.9 % — HIGH (ref 10.3–14.5)
SPECIMEN SOURCE: SIGNIFICANT CHANGE UP
WBC # BLD: 7.39 K/UL — SIGNIFICANT CHANGE UP (ref 3.8–10.5)
WBC # FLD AUTO: 7.39 K/UL — SIGNIFICANT CHANGE UP (ref 3.8–10.5)

## 2023-07-18 PROCEDURE — 76882 US LMTD JT/FCL EVL NVASC XTR: CPT | Mod: 26,RT

## 2023-07-18 PROCEDURE — 99233 SBSQ HOSP IP/OBS HIGH 50: CPT

## 2023-07-18 RX ORDER — CEFTRIAXONE 500 MG/1
1000 INJECTION, POWDER, FOR SOLUTION INTRAMUSCULAR; INTRAVENOUS EVERY 24 HOURS
Refills: 0 | Status: DISCONTINUED | OUTPATIENT
Start: 2023-07-18 | End: 2023-07-20

## 2023-07-18 RX ORDER — ACETAMINOPHEN 500 MG
1000 TABLET ORAL ONCE
Refills: 0 | Status: COMPLETED | OUTPATIENT
Start: 2023-07-18 | End: 2023-07-18

## 2023-07-18 RX ADMIN — Medication 650 MILLIGRAM(S): at 18:01

## 2023-07-18 RX ADMIN — Medication 1000 MILLIGRAM(S): at 04:45

## 2023-07-18 RX ADMIN — LEVETIRACETAM 500 MILLIGRAM(S): 250 TABLET, FILM COATED ORAL at 05:13

## 2023-07-18 RX ADMIN — CEFTRIAXONE 100 MILLIGRAM(S): 500 INJECTION, POWDER, FOR SOLUTION INTRAMUSCULAR; INTRAVENOUS at 11:40

## 2023-07-18 RX ADMIN — Medication 2 SPRAY(S): at 17:09

## 2023-07-18 RX ADMIN — Medication 1 TABLET(S): at 05:13

## 2023-07-18 RX ADMIN — Medication 650 MILLIGRAM(S): at 20:33

## 2023-07-18 RX ADMIN — LEVETIRACETAM 500 MILLIGRAM(S): 250 TABLET, FILM COATED ORAL at 17:09

## 2023-07-18 RX ADMIN — MONTELUKAST 10 MILLIGRAM(S): 4 TABLET, CHEWABLE ORAL at 11:39

## 2023-07-18 RX ADMIN — BUDESONIDE AND FORMOTEROL FUMARATE DIHYDRATE 2 PUFF(S): 160; 4.5 AEROSOL RESPIRATORY (INHALATION) at 17:09

## 2023-07-18 RX ADMIN — Medication 2 SPRAY(S): at 05:13

## 2023-07-18 RX ADMIN — LACOSAMIDE 200 MILLIGRAM(S): 50 TABLET ORAL at 17:09

## 2023-07-18 RX ADMIN — LACOSAMIDE 200 MILLIGRAM(S): 50 TABLET ORAL at 09:20

## 2023-07-18 RX ADMIN — Medication 650 MILLIGRAM(S): at 12:10

## 2023-07-18 RX ADMIN — HEPARIN SODIUM 5000 UNIT(S): 5000 INJECTION INTRAVENOUS; SUBCUTANEOUS at 20:33

## 2023-07-18 RX ADMIN — Medication 650 MILLIGRAM(S): at 00:42

## 2023-07-18 RX ADMIN — HEPARIN SODIUM 5000 UNIT(S): 5000 INJECTION INTRAVENOUS; SUBCUTANEOUS at 14:12

## 2023-07-18 RX ADMIN — Medication 63.75 MILLIMOLE(S): at 03:25

## 2023-07-18 RX ADMIN — Medication 650 MILLIGRAM(S): at 21:33

## 2023-07-18 RX ADMIN — HEPARIN SODIUM 5000 UNIT(S): 5000 INJECTION INTRAVENOUS; SUBCUTANEOUS at 05:13

## 2023-07-18 RX ADMIN — Medication 400 MILLIGRAM(S): at 04:30

## 2023-07-18 RX ADMIN — Medication 650 MILLIGRAM(S): at 11:40

## 2023-07-18 NOTE — PROGRESS NOTE ADULT - ASSESSMENT
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 Patient reports an increased amount of day time sleepiness as well. Patient denies dizziness or lightheadedness. He says his gait is off balanced. Yesterday he could not even get out of bed. Recent admission to EMU for AMS. EEG during that admission only showed focal dysfunction in the left temporal region, mild nonspecific diffuse cerebral dysfunction, but no epileptiform discharge or seizures were recorded. Patient's keppra was increased to 750 mg BID, Vimpat was continued at 200 mg BID. Patient says since his admission he has been sleeping more. Denies unilateral weakness, numbness, tingling vision changes, falls. Exam notable for slow cautious narrow shuffling gait & Romberg (+). Ct head unremarkable. labs pertinent for sodium 129.    Impression: increased lethargy 2/2 possibly increased keppra dose vs toxic/metabolic etiology ( hyponatremia). fluctuating UE tremors and gait difficulty with some decrease of proprioception of b/L feet possibly related to toxic/metabolic etiology  vs neurodegenerative etiology ( however no cogwheel rigidity on exam). No acute cerebrovascular or acute intracranial pathology on CT/ MRI brain    Recommendations:  [] please check labs : Vitamin B1, B6, B12, folate, D25-hydroxy, RPR,copper  [] continue keppra 500mg bid (extended release), continue vimpat 200mg bid  [] follow up with Dr. Jovanny Sweet as an outpatient  [] labs: TSH 0.82, UA/UC (-), Na (130)  [] PT/OT evaluation  [] rest of care per primary team  [] will continue to follow up      Plans discussed with neurology attending, Dr. Boland

## 2023-07-18 NOTE — PATIENT PROFILE ADULT - FALL HARM RISK - HARM RISK INTERVENTIONS
Assistance with ambulation/Assistance OOB with selected safe patient handling equipment/Communicate Risk of Fall with Harm to all staff/Discuss with provider need for PT consult/Monitor gait and stability/Reinforce activity limits and safety measures with patient and family/Tailored Fall Risk Interventions/Visual Cue: Yellow wristband and red socks/Bed in lowest position, wheels locked, appropriate side rails in place/Call bell, personal items and telephone in reach/Instruct patient to call for assistance before getting out of bed or chair/Non-slip footwear when patient is out of bed/[place] to call system/Physically safe environment - no spills, clutter or unnecessary equipment/Purposeful Proactive Rounding/Room/bathroom lighting operational, light cord in reach

## 2023-07-18 NOTE — PHYSICAL THERAPY INITIAL EVALUATION ADULT - ADDITIONAL COMMENTS
lives with spouse , normally independent no AD (recent admit 7/6/23 to 7/11/23 encephalopathy EEG found focal dysfunction L temporal area ) d/c on Home PT

## 2023-07-18 NOTE — PHYSICAL THERAPY INITIAL EVALUATION ADULT - PERTINENT HX OF CURRENT PROBLEM, REHAB EVAL
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 Pt was recently admitted to EMU from 7/6 - 7/11 for encephalopathy. EEG showed focal dysfunction in the L temporal region, mild nonspecific diffuse cerebral dysfunction, and no epileptiform discharge or seizures were recorded. He was d/c w/ HPT. Pt states he was in his NSOH, amb w/o assistive devices as normal. However on Saturday wife noted that the patient felt weaker compared to his baseline. The day of presentation, the patient states he was unable to get up from the toilet. Wife had to help the patient up to the bed. Pt later on was unable to get up from the bed on his own. Pt endorsed headache, In ED interventions: acetaminophen 975mg PO. Neurology consulted.  HCT 7/17/23 There is no obvious acute ICH, mass effect or midline shift. Nonspecific mild periventricular and subcortical white matter hypodensities likely represent microvascular ischemic changes. There is stable cerebral volume loss with ventricular dilatation. Prominent left medial temporal/hippocampal region is again noted.There is no depressed skull fracture. There is persistent sinus mucosal thickening with fluid levels at bilateral maxillary and left sphenoid ;Mildly decreased opacification of bilateral ethmoid sinuses. The tympanomastoid region is unremarkable.  MR BRAIN SPECTROSCOPY 7/17/23:Although L temporal lobe is still slightly large compared with the right there is no longer abnormal signal identified. MR   spectroscopy appears relatively symmetric R/L temporal lobes slightly elevated creatine level which is nonspecific. Normal OLI. No definite abnormality is identified.

## 2023-07-18 NOTE — PROGRESS NOTE ADULT - SUBJECTIVE AND OBJECTIVE BOX
NEUROLOGY FOLLOW-UP CONSULT NOTE    RFC: lethargy    Interval history: No acute neurologic events overnight. Patient reports feeling well    Meds:  MEDICATIONS  (STANDING):  budesonide 160 MICROgram(s)/formoterol 4.5 MICROgram(s) Inhaler 2 Puff(s) Inhalation two times a day  cefTRIAXone   IVPB 1000 milliGRAM(s) IV Intermittent every 24 hours  fluticasone propionate 50 MICROgram(s)/spray Nasal Spray 2 Spray(s) Both Nostrils two times a day  heparin   Injectable 5000 Unit(s) SubCutaneous every 8 hours  lacosamide 200 milliGRAM(s) Oral two times a day  levETIRAcetam 500 milliGRAM(s) Oral two times a day  montelukast 10 milliGRAM(s) Oral daily    MEDICATIONS  (PRN):  acetaminophen     Tablet .. 650 milliGRAM(s) Oral every 6 hours PRN Temp greater or equal to 38C (100.4F), Mild Pain (1 - 3)  aluminum hydroxide/magnesium hydroxide/simethicone Suspension 30 milliLiter(s) Oral every 4 hours PRN Dyspepsia  melatonin 3 milliGRAM(s) Oral at bedtime PRN Insomnia  ondansetron Injectable 4 milliGRAM(s) IV Push every 8 hours PRN Nausea and/or Vomiting      PMHx/PSHx/FHx/SHx:  Difficulty in walking, not elsewhere classified    No pertinent family history in first degree relatives    Asthma    Sinusitis    Focal seizures    COPD with asthma    Impaired ambulation    History of seizure    Generalized weakness    Asthma with COPD    Skin infection    Hyponatremia    Fever    H/O knee surgery    Status post left rotator cuff repair    WEAKNESS      Allergies:  No Known Allergies      ROS: All systems negative except as documented in Interval history    O:  T(C): 36.9 (07-18-23 @ 06:04), Max: 39.1 (07-18-23 @ 03:58)  HR: 77 (07-18-23 @ 11:57) (64 - 92)  BP: 109/69 (07-18-23 @ 11:57) (107/63 - 149/81)  RR: 18 (07-18-23 @ 11:57) (16 - 18)  SpO2: 97% (07-18-23 @ 11:57) (94% - 97%)    Focused neurologic exam:  MS - AAO x person, place/ year, speech fluent, rep/naming intact, follows commands, attn/conc/recent and remote memory/fund of knowledge WNL  CN - PERRLA, EOMI, VFF, face sens/str/hearing WNL b/l, tongue/palate midline, trap 5/5 b/l  Motor - Normal bulk/tone, 5/5 all  Sens - LT/temp intact all, decreased proprioception b/l LE  DTR's - 2+ all except bl nonsustained clonus and downgoing b/l plantar response  Coord - FtN intact b/l, mild Right intention tremors, no gross tremors noted on exam  Gait and station - Not assessed due to fall risk    Pertinent labs/studies:    LABS:  cret                        10.8   7.39  )-----------( 302      ( 18 Jul 2023 11:15 )             34.0     07-17    130<L>  |  95<L>  |  17  ----------------------------<  130<H>  4.2   |  23  |  0.82    Ca    8.9      17 Jul 2023 16:59  Phos  2.3     07-17  Mg     2.0     07-17  TPro  8.0  /  Alb  4.1  /  TBili  0.5  /  DBili  x   /  AST  57<H>  /  ALT  44  /  AlkPhos  85  07-17      < from: CT Head No Cont (07.08.23 @ 18:42) >  IMPRESSION:    No acute intracranial hemorrhage, mass effect    < from: CT Head No Cont (07.17.23 @ 04:00) >  IMPRESSION:    No obvious acute intracranial hemorrhage or mass effect. If clinically   indicated, short-term follow-up or MRI may be obtained for further   evaluation.    < from: MR Spectroscopy (07.10.23 @ 19:07) >  IMPRESSION: Although the left temporal lobe is still slightly large   compared with the right there is no longer abnormal signal identified. MR   spectroscopy appears relatively symmetric right and left temporal lobes   slightly elevated creatine level which is nonspecific. Normal OLI. No   definite abnormality is identified.    < end of copied text >    < from: MR Spectroscopy (07.10.23 @ 19:07) >  *** ADDENDUM # 1 ***    Sagittal neuro Quant imaging was obtained and neuro Quant data is   available    < end of copied text >

## 2023-07-18 NOTE — PROGRESS NOTE ADULT - SUBJECTIVE AND OBJECTIVE BOX
Patient is a 77y old  Male who presents with a chief complaint of unsteady gait (17 Jul 2023 14:02)      SUBJECTIVE / OVERNIGHT EVENTS: Patient seen and examined at bedside. States that he feels well denies any CP, SOB, abd pain and n/v. Overnight Tmax 102.3    ROS:  All other review of systems negative    Allergies    No Known Allergies    Intolerances        MEDICATIONS  (STANDING):  budesonide 160 MICROgram(s)/formoterol 4.5 MICROgram(s) Inhaler 2 Puff(s) Inhalation two times a day  cefTRIAXone   IVPB 1000 milliGRAM(s) IV Intermittent every 24 hours  fluticasone propionate 50 MICROgram(s)/spray Nasal Spray 2 Spray(s) Both Nostrils two times a day  heparin   Injectable 5000 Unit(s) SubCutaneous every 8 hours  lacosamide 200 milliGRAM(s) Oral two times a day  levETIRAcetam 500 milliGRAM(s) Oral two times a day  montelukast 10 milliGRAM(s) Oral daily  trimethoprim  160 mG/sulfamethoxazole 800 mG 1 Tablet(s) Oral two times a day    MEDICATIONS  (PRN):  acetaminophen     Tablet .. 650 milliGRAM(s) Oral every 6 hours PRN Temp greater or equal to 38C (100.4F), Mild Pain (1 - 3)  aluminum hydroxide/magnesium hydroxide/simethicone Suspension 30 milliLiter(s) Oral every 4 hours PRN Dyspepsia  melatonin 3 milliGRAM(s) Oral at bedtime PRN Insomnia  ondansetron Injectable 4 milliGRAM(s) IV Push every 8 hours PRN Nausea and/or Vomiting      Vital Signs Last 24 Hrs  T(C): 36.9 (18 Jul 2023 06:04), Max: 39.1 (18 Jul 2023 03:58)  T(F): 98.4 (18 Jul 2023 06:04), Max: 102.3 (18 Jul 2023 03:58)  HR: 77 (18 Jul 2023 11:57) (64 - 92)  BP: 109/69 (18 Jul 2023 11:57) (107/63 - 149/81)  BP(mean): 82 (17 Jul 2023 16:51) (82 - 82)  RR: 18 (18 Jul 2023 11:57) (16 - 18)  SpO2: 97% (18 Jul 2023 11:57) (94% - 97%)    Parameters below as of 18 Jul 2023 11:57  Patient On (Oxygen Delivery Method): room air      CAPILLARY BLOOD GLUCOSE        I&O's Summary      PHYSICAL EXAM:  GENERAL: elderly  HEAD:  Atraumatic, Normocephalic  EYES: EOMI, PERRLA, conjunctiva and sclera clear  NECK: Supple, No JVD  CHEST/LUNG: Clear to auscultation bilaterally; No wheeze  HEART: Regular rate and rhythm; No murmurs, rubs, or gallops  ABDOMEN: Soft, Nontender, Nondistended; Bowel sounds present  EXTREMITIES:  2+ Peripheral Pulses, No clubbing, cyanosis, or edema  NEUROLOGY: AAOx3, non-focal  PSYCH: calm  SKIN: right elbow erythema noted, however no warmth, no drainage noted     LABS:                        10.8   7.39  )-----------( 302      ( 18 Jul 2023 11:15 )             34.0     07-17    130<L>  |  95<L>  |  17  ----------------------------<  130<H>  4.2   |  23  |  0.82    Ca    8.9      17 Jul 2023 16:59  Phos  2.3     07-17  Mg     2.0     07-17    TPro  8.0  /  Alb  4.1  /  TBili  0.5  /  DBili  x   /  AST  57<H>  /  ALT  44  /  AlkPhos  85  07-17          Urinalysis Basic - ( 17 Jul 2023 16:59 )    Color: x / Appearance: x / SG: x / pH: x  Gluc: 130 mg/dL / Ketone: x  / Bili: x / Urobili: x   Blood: x / Protein: x / Nitrite: x   Leuk Esterase: x / RBC: x / WBC x   Sq Epi: x / Non Sq Epi: x / Bacteria: x        RADIOLOGY & ADDITIONAL TESTS:    Consultant(s) Notes Reviewed:  Neurology     Care Discussed with Consultants/Other Providers: Makayla Guzman     Case Discussed with brother at bedside

## 2023-07-18 NOTE — PROGRESS NOTE ADULT - PROBLEM SELECTOR PLAN 2
fever 102.3 overnight unclear etiology   - noted to have erythema elbow ?skin infection taking bactrim outpt, Per Patient erythema is improving will check u/s elbow to r/o abscess   - started on empiric CTX, unclear is pt aspirated due to severe lethargy as describe by his brother at bedside    - urine cx neg  - CXR neg  - will also check CT chest and procal in AM   - currently without leukocytosis

## 2023-07-18 NOTE — PROVIDER CONTACT NOTE (OTHER) - SITUATION
pt noted to have tremors to his b/l upper extremities; R>L; provider made aware as soon as possible; came to assess bedside. pt is a/o x4; vitals stable/afebrile. denies of chest pain or resp distress

## 2023-07-19 LAB
24R-OH-CALCIDIOL SERPL-MCNC: 24 NG/ML — LOW (ref 30–80)
ANION GAP SERPL CALC-SCNC: 16 MMOL/L — SIGNIFICANT CHANGE UP (ref 5–17)
BUN SERPL-MCNC: 15 MG/DL — SIGNIFICANT CHANGE UP (ref 7–23)
CALCIUM SERPL-MCNC: 8.5 MG/DL — SIGNIFICANT CHANGE UP (ref 8.4–10.5)
CHLORIDE SERPL-SCNC: 94 MMOL/L — LOW (ref 96–108)
CO2 SERPL-SCNC: 19 MMOL/L — LOW (ref 22–31)
CREAT SERPL-MCNC: 0.89 MG/DL — SIGNIFICANT CHANGE UP (ref 0.5–1.3)
CRP SERPL-MCNC: 123 MG/L — HIGH (ref 0–4)
EGFR: 88 ML/MIN/1.73M2 — SIGNIFICANT CHANGE UP
ERYTHROCYTE [SEDIMENTATION RATE] IN BLOOD: 71 MM/HR — HIGH (ref 0–20)
FOLATE SERPL-MCNC: 9.6 NG/ML — SIGNIFICANT CHANGE UP
GLUCOSE SERPL-MCNC: 94 MG/DL — SIGNIFICANT CHANGE UP (ref 70–99)
HCT VFR BLD CALC: 33.6 % — LOW (ref 39–50)
HGB BLD-MCNC: 11 G/DL — LOW (ref 13–17)
MCHC RBC-ENTMCNC: 25.1 PG — LOW (ref 27–34)
MCHC RBC-ENTMCNC: 32.7 GM/DL — SIGNIFICANT CHANGE UP (ref 32–36)
MCV RBC AUTO: 76.7 FL — LOW (ref 80–100)
NRBC # BLD: 0 /100 WBCS — SIGNIFICANT CHANGE UP (ref 0–0)
OSMOLALITY UR: 815 MOS/KG — SIGNIFICANT CHANGE UP (ref 300–900)
PLATELET # BLD AUTO: 304 K/UL — SIGNIFICANT CHANGE UP (ref 150–400)
POTASSIUM SERPL-MCNC: 3.9 MMOL/L — SIGNIFICANT CHANGE UP (ref 3.5–5.3)
POTASSIUM SERPL-SCNC: 3.9 MMOL/L — SIGNIFICANT CHANGE UP (ref 3.5–5.3)
PROCALCITONIN SERPL-MCNC: 0.26 NG/ML — HIGH (ref 0.02–0.1)
RBC # BLD: 4.38 M/UL — SIGNIFICANT CHANGE UP (ref 4.2–5.8)
RBC # FLD: 15.9 % — HIGH (ref 10.3–14.5)
SODIUM SERPL-SCNC: 129 MMOL/L — LOW (ref 135–145)
SODIUM UR-SCNC: 32 MMOL/L — SIGNIFICANT CHANGE UP
T PALLIDUM AB TITR SER: NEGATIVE — SIGNIFICANT CHANGE UP
VIT B12 SERPL-MCNC: 769 PG/ML — SIGNIFICANT CHANGE UP (ref 232–1245)
WBC # BLD: 6.84 K/UL — SIGNIFICANT CHANGE UP (ref 3.8–10.5)
WBC # FLD AUTO: 6.84 K/UL — SIGNIFICANT CHANGE UP (ref 3.8–10.5)

## 2023-07-19 PROCEDURE — 99233 SBSQ HOSP IP/OBS HIGH 50: CPT

## 2023-07-19 PROCEDURE — 99222 1ST HOSP IP/OBS MODERATE 55: CPT

## 2023-07-19 PROCEDURE — 71250 CT THORAX DX C-: CPT | Mod: 26

## 2023-07-19 RX ADMIN — BUDESONIDE AND FORMOTEROL FUMARATE DIHYDRATE 2 PUFF(S): 160; 4.5 AEROSOL RESPIRATORY (INHALATION) at 06:17

## 2023-07-19 RX ADMIN — MONTELUKAST 10 MILLIGRAM(S): 4 TABLET, CHEWABLE ORAL at 11:02

## 2023-07-19 RX ADMIN — HEPARIN SODIUM 5000 UNIT(S): 5000 INJECTION INTRAVENOUS; SUBCUTANEOUS at 14:03

## 2023-07-19 RX ADMIN — Medication 650 MILLIGRAM(S): at 06:16

## 2023-07-19 RX ADMIN — CEFTRIAXONE 100 MILLIGRAM(S): 500 INJECTION, POWDER, FOR SOLUTION INTRAMUSCULAR; INTRAVENOUS at 11:02

## 2023-07-19 RX ADMIN — Medication 2 SPRAY(S): at 06:16

## 2023-07-19 RX ADMIN — LEVETIRACETAM 500 MILLIGRAM(S): 250 TABLET, FILM COATED ORAL at 06:16

## 2023-07-19 RX ADMIN — HEPARIN SODIUM 5000 UNIT(S): 5000 INJECTION INTRAVENOUS; SUBCUTANEOUS at 06:16

## 2023-07-19 RX ADMIN — Medication 2 SPRAY(S): at 17:05

## 2023-07-19 RX ADMIN — LEVETIRACETAM 500 MILLIGRAM(S): 250 TABLET, FILM COATED ORAL at 17:04

## 2023-07-19 RX ADMIN — HEPARIN SODIUM 5000 UNIT(S): 5000 INJECTION INTRAVENOUS; SUBCUTANEOUS at 21:19

## 2023-07-19 RX ADMIN — LACOSAMIDE 200 MILLIGRAM(S): 50 TABLET ORAL at 06:19

## 2023-07-19 RX ADMIN — BUDESONIDE AND FORMOTEROL FUMARATE DIHYDRATE 2 PUFF(S): 160; 4.5 AEROSOL RESPIRATORY (INHALATION) at 17:04

## 2023-07-19 RX ADMIN — LACOSAMIDE 200 MILLIGRAM(S): 50 TABLET ORAL at 17:04

## 2023-07-19 NOTE — PROGRESS NOTE ADULT - PROBLEM SELECTOR PLAN 2
fever 102.3 7/18 unclear etiology poss cellulits? r/o asp PNA   - noted to have erythema elbow ?skin infection taking bactrim outpt d/c'ed while inpatient   - u/s elbow neg for abscess, small joint effusion check ESR/CRP, currently w/o pain, full ROM and w/o leukocytosis  and R elbow erythema improving   - started on empiric CTX, unclear is pt aspirated due to severe lethargy as describe by his brother at bedside    - urine cx neg  - CXR neg  - procal elevated s/p CT chest (pending results)   - poss ID consult if above w/u is inconclusive

## 2023-07-19 NOTE — PROGRESS NOTE ADULT - SUBJECTIVE AND OBJECTIVE BOX
NEUROLOGY FOLLOW-UP CONSULT NOTE    RFC: lethargy    Interval history: No acute neurologic events overnight. Patient reports feeling well and improvement of hand tremors b/l.    Meds:  MEDICATIONS  (STANDING):  budesonide 160 MICROgram(s)/formoterol 4.5 MICROgram(s) Inhaler 2 Puff(s) Inhalation two times a day  cefTRIAXone   IVPB 1000 milliGRAM(s) IV Intermittent every 24 hours  fluticasone propionate 50 MICROgram(s)/spray Nasal Spray 2 Spray(s) Both Nostrils two times a day  heparin   Injectable 5000 Unit(s) SubCutaneous every 8 hours  lacosamide 200 milliGRAM(s) Oral two times a day  levETIRAcetam 500 milliGRAM(s) Oral two times a day  montelukast 10 milliGRAM(s) Oral daily    MEDICATIONS  (PRN):  acetaminophen     Tablet .. 650 milliGRAM(s) Oral every 6 hours PRN Temp greater or equal to 38C (100.4F), Mild Pain (1 - 3)  aluminum hydroxide/magnesium hydroxide/simethicone Suspension 30 milliLiter(s) Oral every 4 hours PRN Dyspepsia  melatonin 3 milliGRAM(s) Oral at bedtime PRN Insomnia  ondansetron Injectable 4 milliGRAM(s) IV Push every 8 hours PRN Nausea and/or Vomiting    PMHx/PSHx/FHx/SHx:  Difficulty in walking, not elsewhere classified    No pertinent family history in first degree relatives    Asthma    Sinusitis    Focal seizures    COPD with asthma    Impaired ambulation    History of seizure    Generalized weakness    Asthma with COPD    Skin infection    Hyponatremia    Fever    H/O knee surgery    Status post left rotator cuff repair    WEAKNESS      Allergies:  No Known Allergies      ROS: All systems negative except as documented in Interval history    O:  T(C): 36.7 (07-19-23 @ 11:37), Max: 37.3 (07-18-23 @ 19:48)  T(F): 98 (07-19-23 @ 11:37), Max: 99.2 (07-18-23 @ 19:48)  HR: 74 (07-19-23 @ 11:37) (74 - 82)  BP: 108/68 (07-19-23 @ 11:37) (107/64 - 122/70)  RR: 18 (07-19-23 @ 11:37) (18 - 20)  SpO2: 95% (07-19-23 @ 11:37) (93% - 98%)  Wt(kg): --    Focused neurologic exam:  MS - AAO x person, place/ year, speech fluent, rep/naming intact, follows commands, attn/conc/recent and remote memory/fund of knowledge WNL  CN - PERRLA, EOMI, VFF, face sens/str/hearing WNL b/l, tongue/palate midline, trap 5/5 b/l  Motor - Normal bulk/tone, 5/5 all  Sens - LT/temp intact all, decreased proprioception b/l LE  DTR's - 2+ all except bl nonsustained clonus and downgoing b/l plantar response  Coord - FtN intact b/l, very mild/improved b/l intention tremors with postural components  Gait and station - Not assessed due to fall risk    Pertinent labs/studies:      LABS:  cret                        11.0   6.84  )-----------( 304      ( 19 Jul 2023 06:43 )             33.6     07-19    129<L>  |  94<L>  |  15  ----------------------------<  94  3.9   |  19<L>  |  0.89    Ca    8.5      19 Jul 2023 06:43  Phos  2.3     07-17  Mg     2.0     07-17            < from: CT Head No Cont (07.08.23 @ 18:42) >  IMPRESSION:    No acute intracranial hemorrhage, mass effect    < from: CT Head No Cont (07.17.23 @ 04:00) >  IMPRESSION:    No obvious acute intracranial hemorrhage or mass effect. If clinically   indicated, short-term follow-up or MRI may be obtained for further   evaluation.    < from: MR Spectroscopy (07.10.23 @ 19:07) >  IMPRESSION: Although the left temporal lobe is still slightly large   compared with the right there is no longer abnormal signal identified. MR   spectroscopy appears relatively symmetric right and left temporal lobes   slightly elevated creatine level which is nonspecific. Normal OLI. No   definite abnormality is identified.    < end of copied text >    < from: MR Spectroscopy (07.10.23 @ 19:07) >  *** ADDENDUM # 1 ***    Sagittal neuro Quant imaging was obtained and neuro Quant data is   available    < end of copied text >

## 2023-07-19 NOTE — PROGRESS NOTE ADULT - ASSESSMENT
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 Patient reports an increased amount of day time sleepiness as well. Patient denies dizziness or lightheadedness. He says his gait is off balanced. Yesterday he could not even get out of bed. Recent admission to EMU for AMS. EEG during that admission only showed focal dysfunction in the left temporal region, mild nonspecific diffuse cerebral dysfunction, but no epileptiform discharge or seizures were recorded. Patient's keppra was increased to 750 mg BID, Vimpat was continued at 200 mg BID. Patient says since his admission he has been sleeping more. Denies unilateral weakness, numbness, tingling vision changes, falls. Exam notable for slow cautious narrow shuffling gait & Romberg (+). Ct head unremarkable. labs pertinent for sodium 129.    Impression: increased lethargy 2/2 possibly increased keppra dose vs toxic/metabolic etiology ( hyponatremia). fluctuating UE tremors and gait difficulty with some decrease of proprioception of b/L feet possibly related to toxic/metabolic etiology  vs neurodegenerative etiology ( however no cogwheel rigidity on exam). No acute cerebrovascular or acute intracranial pathology on CT/ MRI brain. 7/19 Improved tremors and mental status today    Recommendations:  [] continue keppra 500mg bid (extended release), continue vimpat 200mg bid  [] labs: TSH 0.82, UA/UC (-), Na (130), Vitamin B1, B6, B12 (769), folate (9.6), D25-hydroxy (24), RPR ( negative) ,copper  [] PT/OT evaluation  [] rest of care per primary team  [] Patient can follow up with  Dr. Sweet at [address] [place] [address] 1-2 weeks after discharge by calling [phone] to schedule this appointment  [] No further inpatient neurology recommendation, will sign off, please call consult service [phone] with any questions.      Plans discussed with neurology attending, Dr. Boland

## 2023-07-19 NOTE — PROGRESS NOTE ADULT - SUBJECTIVE AND OBJECTIVE BOX
Patient is a 77y old  Male who presents with a chief complaint of unsteady gait (18 Jul 2023 14:50)      SUBJECTIVE / OVERNIGHT EVENTS: Patient seen and examined at bedside. States that he feels well denies any complaints, no acute events overnight. elbow erythema improving     ROS:  All other review of systems negative    Allergies    No Known Allergies    Intolerances        MEDICATIONS  (STANDING):  budesonide 160 MICROgram(s)/formoterol 4.5 MICROgram(s) Inhaler 2 Puff(s) Inhalation two times a day  cefTRIAXone   IVPB 1000 milliGRAM(s) IV Intermittent every 24 hours  fluticasone propionate 50 MICROgram(s)/spray Nasal Spray 2 Spray(s) Both Nostrils two times a day  heparin   Injectable 5000 Unit(s) SubCutaneous every 8 hours  lacosamide 200 milliGRAM(s) Oral two times a day  levETIRAcetam 500 milliGRAM(s) Oral two times a day  montelukast 10 milliGRAM(s) Oral daily    MEDICATIONS  (PRN):  acetaminophen     Tablet .. 650 milliGRAM(s) Oral every 6 hours PRN Temp greater or equal to 38C (100.4F), Mild Pain (1 - 3)  aluminum hydroxide/magnesium hydroxide/simethicone Suspension 30 milliLiter(s) Oral every 4 hours PRN Dyspepsia  melatonin 3 milliGRAM(s) Oral at bedtime PRN Insomnia  ondansetron Injectable 4 milliGRAM(s) IV Push every 8 hours PRN Nausea and/or Vomiting      Vital Signs Last 24 Hrs  T(C): 37 (19 Jul 2023 06:41), Max: 37.3 (18 Jul 2023 19:48)  T(F): 98.6 (19 Jul 2023 06:41), Max: 99.2 (18 Jul 2023 19:48)  HR: 80 (19 Jul 2023 06:41) (64 - 82)  BP: 110/65 (19 Jul 2023 06:41) (107/64 - 122/70)  BP(mean): --  RR: 20 (19 Jul 2023 06:41) (18 - 20)  SpO2: 97% (19 Jul 2023 06:41) (93% - 98%)    Parameters below as of 18 Jul 2023 19:48  Patient On (Oxygen Delivery Method): room air      CAPILLARY BLOOD GLUCOSE        I&O's Summary    18 Jul 2023 07:01  -  19 Jul 2023 07:00  --------------------------------------------------------  IN: 240 mL / OUT: 0 mL / NET: 240 mL        PHYSICAL EXAM:  GENERAL: NAD, well-developed  HEAD:  Atraumatic, Normocephalic  EYES: EOMI, PERRLA, conjunctiva and sclera clear  NECK: Supple, No JVD  CHEST/LUNG: Clear to auscultation bilaterally; No wheeze  HEART: Regular rate and rhythm; No murmurs, rubs, or gallops  ABDOMEN: Soft, Nontender, Nondistended; Bowel sounds present  EXTREMITIES:  2+ Peripheral Pulses, R elbow erythema (improving), full ROM, Non tender.   NEUROLOGY: AAOx3, non-focal  PSYCH: calm  SKIN: No rashes or lesions    LABS:                        11.0   6.84  )-----------( 304      ( 19 Jul 2023 06:43 )             33.6     07-19    129<L>  |  94<L>  |  15  ----------------------------<  94  3.9   |  19<L>  |  0.89    Ca    8.5      19 Jul 2023 06:43  Phos  2.3     07-17  Mg     2.0     07-17            Urinalysis Basic - ( 19 Jul 2023 06:43 )    Color: x / Appearance: x / SG: x / pH: x  Gluc: 94 mg/dL / Ketone: x  / Bili: x / Urobili: x   Blood: x / Protein: x / Nitrite: x   Leuk Esterase: x / RBC: x / WBC x   Sq Epi: x / Non Sq Epi: x / Bacteria: x        RADIOLOGY & ADDITIONAL TESTS:    Imaging Personally Reviewed:  u/s elbow:  IMPRESSION:  Edema within the subcutaneous soft  tissues overlying the right elbow. No   fluid collection or abscess is identified within the soft tissues.  Small  amount of fluid within the olecranon fossa compatible with small    elbow joint effusion.    Case Discussed with wife (over the phone), and brother at bedside

## 2023-07-19 NOTE — CONSULT NOTE ADULT - ASSESSMENT
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 R elbow erythema noted, with US R elbow with 0.5 x 0.5 x 0.8 cm olecranon fossa fluid collection.    #R elbow SSTI 77-yo M w/ PMH of focal seizures on Keppra/Vimpat, COPD/asthma, and recent admission 7/6-11 for encephalopathy, admitted with inability to ambulate, c/b fever to 102.3, w/ ESR 71 and  w/o leukocytosis. R elbow erythema noted, with US R elbow with 0.5 x 0.5 x 0.8 cm olecranon fossa fluid collection.    #R elbow SSTI  #Fever  - Nontoxic appearing. No subjective complaints. Nontender elbow, ROM intact. Subacute to chronic infection.  - C/f NTM infection, including Mycobacterium marinum.  - Note that Mycobacterium workup and treatment requires a solid diagnosis and susceptibility before committing to a treatment (usually very long - months to years), and many times combination of meds.  - ESR and CRP elevated. Would rule out bone involvement.    Recommendations:  - Dermatology for biopsy of the lesion (path) and mycobacterial culture. Please specify r/o NTM, including mycobacterium marinum. Also send fungal culture  - No skin-directed treatment at this time unless there is a diagnosis or concerning signs/symptoms that warrants acute intervention.  - MRI of the R elbow to look for bone involvement  - F/u BCx and UCx.  - Can send fungal markers, including serum Fungitell, Aspergillus galactomannan, and urine histoplasma. Low yield.  - CBC with diff  - HIV and syphilis screen.    Plan discussed with primary team ACP.    Thank you for this consult. Inpatient ID team will follow.    Galdino Thompson MD, PhD  Attending Physician  Division of Infectious Diseases  Department of Medicine    Please contact through MS Teams message.  Office: [phone] (after 5 PM or weekend)    77-yo M w/ PMH of focal seizures on Keppra/Vimpat, COPD/asthma, and recent admission 7/6-11 for encephalopathy, admitted with inability to ambulate, c/b fever to 102.3, w/ ESR 71 and  w/o leukocytosis. R elbow erythema noted, with US R elbow with 0.5 x 0.5 x 0.8 cm olecranon fossa fluid collection.    #R elbow SSTI  #Fever  #Environmental exposure  #Elevated ESR and CRP  - Nontoxic appearing. No subjective complaints. Nontender elbow, ROM intact. Subacute to chronic infection.  - C/f NTM infection, including Mycobacterium marinum, due to exposure to fish tank and pond.  - Note that Mycobacterium workup and treatment requires a solid diagnosis and susceptibility before committing to a treatment (usually very long - months to years), and many times combination of meds.  - ESR and CRP elevated. Would rule out bone involvement.    Recommendations:  - Dermatology for biopsy of the lesion (path) and mycobacterial culture. Please specify r/o NTM, including mycobacterium marinum. Also send fungal culture  - No skin-directed treatment at this time unless there is a diagnosis or concerning signs/symptoms that warrants acute intervention.  - MRI of the R elbow to look for bone involvement  - F/u BCx and UCx.  - Can send fungal markers, including serum Fungitell, Aspergillus galactomannan, and urine histoplasma. Low yield.  - CBC with diff  - HIV and syphilis screen.    Plan discussed with primary team ACP.    Thank you for this consult. Inpatient ID team will follow.    Galdino Thompson MD, PhD  Attending Physician  Division of Infectious Diseases  Department of Medicine    Please contact through MS Teams message.  Office: [phone] (after 5 PM or weekend)

## 2023-07-19 NOTE — CONSULT NOTE ADULT - SUBJECTIVE AND OBJECTIVE BOX
*** INCOMPLETE NOTE. 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 Recent admission 7/6-11 to EMU w/ EEG showing no epileptiform discharge or seizures. Focal dysfunction in the L temporal region and mild nonspecific diffuse cerebral dysfunction seen. Discharged home, however patient was noted to be significantly weaker than baseline to the point where patient was unable to get up, prompting another ED presentation and admission (7/17). Currently managed by medicine/neurology. Weakness possibly a/w Keppra/Vimpat, being managed with dose change. C/b fever to 102.3 on 7/18 w/o leukocytosis. ESR 71 and . WBC WNL. UA w/ mod gary and 5 WBC. UCx neg. BCx NGTD. CT chest w/ no evidence of pneumonia. US R elbow with 0.5 x 0.5 x 0.8 cm olecranon fossa fluid collection. ID was consulted for this finding.    prior hospital charts reviewed [X]  primary team notes reviewed [X]  other consultant notes reviewed [X]    PAST MEDICAL & SURGICAL HISTORY:  Asthma      Sinusitis      Focal seizures      COPD with asthma      H/O knee surgery  BILATERAL SCOPE      Status post left rotator cuff repair          Allergies  No Known Allergies        ANTIMICROBIALS:  cefTRIAXone   IVPB 1000 every 24 hours      OTHER MEDS: MEDICATIONS  (STANDING):  acetaminophen     Tablet .. 650 every 6 hours PRN  aluminum hydroxide/magnesium hydroxide/simethicone Suspension 30 every 4 hours PRN  budesonide 160 MICROgram(s)/formoterol 4.5 MICROgram(s) Inhaler 2 two times a day  heparin   Injectable 5000 every 8 hours  lacosamide 200 two times a day  levETIRAcetam 500 two times a day  melatonin 3 at bedtime PRN  montelukast 10 daily  ondansetron Injectable 4 every 8 hours PRN      SOCIAL HISTORY:   hx smoking  non-smoker    FAMILY HISTORY:      REVIEW OF SYSTEMS  [  ] ROS unobtainable because:    [  ] All other systems negative except as noted below:	    Constitutional:  [ ] fever [ ] chills  [ ] weight loss  [ ] weakness  Skin:  [ ] rash [ ] phlebitis	  Eyes: [ ] icterus [ ] pain  [ ] discharge	  ENMT: [ ] sore throat  [ ] thrush [ ] ulcers [ ] exudates  Respiratory: [ ] dyspnea [ ] hemoptysis [ ] cough [ ] sputum	  Cardiovascular:  [ ] chest pain [ ] palpitations [ ] edema	  Gastrointestinal:  [ ] nausea [ ] vomiting [ ] diarrhea [ ] constipation [ ] pain	  Genitourinary:  [ ] dysuria [ ] frequency [ ] hematuria [ ] discharge [ ] flank pain  [ ] incontinence  Musculoskeletal:  [ ] myalgias [ ] arthralgias [ ] arthritis  [ ] back pain  Neurological:  [ ] headache [ ] seizures  [ ] confusion/altered mental status  Psychiatric:  [ ] anxiety [ ] depression	  Hematology/Lymphatics:  [ ] lymphadenopathy  Endocrine:  [ ] adrenal [ ] thyroid  Allergic/Immunologic:	 [ ] transplant [ ] seasonal    Vital Signs Last 24 Hrs  T(F): 98 (07-19-23 @ 11:37), Max: 102.3 (07-18-23 @ 03:58)    Vital Signs Last 24 Hrs  HR: 74 (07-19-23 @ 11:37) (74 - 82)  BP: 108/68 (07-19-23 @ 11:37) (107/64 - 122/70)  RR: 18 (07-19-23 @ 11:37)  SpO2: 95% (07-19-23 @ 11:37) (93% - 98%)  Wt(kg): --    PHYSICAL EXAM:  Constitutional: non-toxic, no distress  HEAD/EYES: anicteric, no conjunctival injection  ENT:  supple, no thrush  Cardiovascular:   normal S1, S2, no murmur, no edema  Respiratory:  clear BS bilaterally, no wheezes, no rales  GI:  soft, non-tender, normal bowel sounds  :  no weaver, no CVA tenderness  Musculoskeletal:  no synovitis, normal ROM  Neurologic: awake and alert, normal strength, no focal findings  Skin:  no rash, no erythema, no phlebitis  Heme/Onc: no lymphadenopathy   Psychiatric:  awake, alert, appropriate mood                                11.0   6.84  )-----------( 304      ( 19 Jul 2023 06:43 )             33.6       07-19    129<L>  |  94<L>  |  15  ----------------------------<  94  3.9   |  19<L>  |  0.89    Ca    8.5      19 Jul 2023 06:43  Phos  2.3     07-17  Mg     2.0     07-17        Urinalysis Basic - ( 19 Jul 2023 06:43 )    Color: x / Appearance: x / SG: x / pH: x  Gluc: 94 mg/dL / Ketone: x  / Bili: x / Urobili: x   Blood: x / Protein: x / Nitrite: x   Leuk Esterase: x / RBC: x / WBC x   Sq Epi: x / Non Sq Epi: x / Bacteria: x        MICROBIOLOGY:        Culture - Blood (collected 07-18-23 @ 07:30)  Source: .Blood Blood-Peripheral  Preliminary Report (07-19-23 @ 11:01):    No growth at 24 hours    Culture - Blood (collected 07-18-23 @ 07:30)  Source: .Blood Blood-Peripheral  Preliminary Report (07-19-23 @ 11:01):    No growth at 24 hours    Culture - Urine (collected 07-17-23 @ 06:05)  Source: Clean Catch Clean Catch (Midstream)  Final Report (07-18-23 @ 07:14):    No growth                    RADIOLOGY:  imaging below personally reviewed  < from: CT Chest No Cont (07.19.23 @ 10:33) >    ACC: [id number] EXAM:  CT CHEST   ORDERED BY: NOVA MOREIRA     PROCEDURE DATE:  07/19/2023          INTERPRETATION:  INDICATION: Fever, AMS. Question pneumonia.    TECHNIQUE: Helical acquisition images of the chest without intravenous   contrast. Maximum intensity projection images were generated.    COMPARISON: CT chest 1/25/2023..    FINDINGS:    LUNGS/AIRWAYS/PLEURA: Multiple subpleural nodules most predominantly in   the right upper lobe, unchanged from CT in January 2023. Bilateral lower   lobe linear opacities. Trace bilateral pleural effusions.    LYMPH NODES/MEDIASTINUM: Lymph nodes unremarkable.    HEART/VASCULATURE: Trace pericardial effusion. Heart normal in size.   Coronary calcifications. Calcific plaque at distal aspect of innominate   artery.    UPPER ABDOMEN: Unchanged right adrenal gland.    BONES/SOFT TISSUES: Degenerative changes of left glenohumeral joint.  Soft tissues unremarkable.    IMPRESSION:    No pneumonia.  Opacities in the lower lobes are likely atelectasis.    --- End of Report ---           CRISTHIAN CARCAMO MD; Resident Radiologist  This document has been electronically signed.  WIL RAYMOND M.D., ATTENDING RADIOLOGIST  This document has been electronically signed. Jul 19 2023 12:29PM    < end of copied text >  < from: US Extremity Nonvasc Limited, Right (07.18.23 @ 16:27) >    ACC: [id number] EXAM:  US NONVASC EXT LTD RT   ORDERED BY: NOVA MOREIRA     PROCEDURE DATE:  07/18/2023          INTERPRETATION:  CLINICAL INFORMATION: 77-year-old male with cellulitis   right elbow, evaluate for abscess.    TECHNIQUE: Focused sonographic evaluation of the right elbow was   performed. Grayscale and color Doppler images were acquired.    COMPARISON: None    FINDINGS:  Focused examination of the right elbow in the area of erythema reveals    soft tissue thickening and edema withinthe subcutaneous tissues   overlying the elbow. No fluid collection or abscess within the soft   tissues is identified.  No significant hyperemia is demonstrated with   color Doppler.    There is a small collection of fluid in the olecranon fossa measuring   approximately 0.5 x 0.5 x 0.8 cm. compatible with a small elbow joint   effusion.    IMPRESSION:  Edema within the subcutaneous soft  tissues overlying the right elbow. No   fluid collection or abscess is identified within the soft tissues.    Small  amount of fluid within the olecranon fossa compatible with small    elbow joint effusion.    --- End of Report ---          BENJY MOSES MD; Fellow Radiology  This document has been electronically signed.  VALENTINA VU MD; Attending Radiologist  This document has been electronically signed. 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 Recent admission 7/6-11 to EMU w/ EEG showing no epileptiform discharge or seizures. Focal dysfunction in the L temporal region and mild nonspecific diffuse cerebral dysfunction seen. Discharged home, however patient was noted to be significantly weaker than baseline to the point where patient was unable to get up, prompting another ED presentation and admission (7/17). Currently managed by medicine/neurology. Weakness possibly a/w Keppra/Vimpat, being managed with dose change. C/b fever to 102.3 on 7/18 w/o leukocytosis. ESR 71 and . WBC WNL. UA w/ mod gary and 5 WBC. UCx neg. BCx NGTD. CT chest w/ no evidence of pneumonia. US R elbow with 0.5 x 0.5 x 0.8 cm olecranon fossa fluid collection. ID was consulted for this finding.    Patient denies fever, chills, nausea, vomiting, or SOB. Recorded fever to 102.3 yesterday, however no subjective fever. No elbow pain. Patient stated he has a fish tank and a pond at home. 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 Stated he has had R elbow "skin infection" for several years. Wife stated he must have sustained an injury to the R elbow, but the patient does not recall. He is in care of a dermatologist not affiliated with [facility]. Patient stated that the infection was worse at some point with drainage, s/p treatment of unknown medication for unknown duration. Patient thinks the elbow lesion is overall getting better. Denies elbow pain or difficulty bending the elbow.    prior hospital charts reviewed [X]  primary team notes reviewed [X]  other consultant notes reviewed [X]    PAST MEDICAL & SURGICAL HISTORY:  Asthma      Sinusitis      Focal seizures      COPD with asthma      H/O knee surgery  BILATERAL SCOPE      Status post left rotator cuff repair          Allergies  No Known Allergies        ANTIMICROBIALS:  cefTRIAXone   IVPB 1000 every 24 hours      OTHER MEDS: MEDICATIONS  (STANDING):  acetaminophen     Tablet .. 650 every 6 hours PRN  aluminum hydroxide/magnesium hydroxide/simethicone Suspension 30 every 4 hours PRN  budesonide 160 MICROgram(s)/formoterol 4.5 MICROgram(s) Inhaler 2 two times a day  heparin   Injectable 5000 every 8 hours  lacosamide 200 two times a day  levETIRAcetam 500 two times a day  melatonin 3 at bedtime PRN  montelukast 10 daily  ondansetron Injectable 4 every 8 hours PRN      SOCIAL HISTORY:   No current use of tobacco, EtOH, or illicit drug  Lives in [place]. Has a summer house in [place].  Retired .  Has a fish tank and a pond    FAMILY HISTORY:  No family history of fish tank granuloma      REVIEW OF SYSTEMS  [  ] ROS unobtainable because:    [X] All other systems negative except as noted below:	    Constitutional:  [ ] fever [ ] chills  [X] weakness  Skin:  [X] rash [ ] phlebitis	  Eyes: [ ] icterus [ ] pain  [ ] discharge	  ENMT: [ ] sore throat  [ ] thrush [ ] ulcers [ ] exudates  Respiratory: [ ] dyspnea [ ] hemoptysis [ ] cough [ ] sputum	  Cardiovascular:  [ ] chest pain [ ] palpitations [ ] edema	  Gastrointestinal:  [ ] nausea [ ] vomiting [ ] diarrhea [ ] constipation [ ] pain	  Genitourinary:  [ ] dysuria [ ] frequency [ ] incontinence  Musculoskeletal:  [ ] myalgias [ ] arthralgias [ ] back pain  Neurological:  [ ] headache [ ] seizures  [ ] confusion/altered mental status  Psychiatric:  [ ] anxiety [ ] depression  Hematology/Lymphatics:  [ ] lymphadenopathy  Endocrine:  [ ] adrenal [ ] thyroid  Allergic/Immunologic:	 [ ] transplant [ ] seasonal    Vital Signs Last 24 Hrs  T(F): 98 (07-19-23 @ 11:37), Max: 102.3 (07-18-23 @ 03:58)    Vital Signs Last 24 Hrs  HR: 74 (07-19-23 @ 11:37) (74 - 82)  BP: 108/68 (07-19-23 @ 11:37) (107/64 - 122/70)  RR: 18 (07-19-23 @ 11:37)  SpO2: 95% (07-19-23 @ 11:37) (93% - 98%)  Wt(kg): --    PHYSICAL EXAM:  Constitutional: non-toxic, no distress  HEAD/EYES: slight conjunctival injection  ENT: supple  Cardiovascular: normal S1, S2, no murmur, no edema  Respiratory: No crackles bilaterally anterior and axillary lung fields. no wheezes, no rales  GI:  soft, non-tender, normal bowel sounds  : no weaver, no CVA tenderness  Musculoskeletal: R elbow slightly erythematous with firm induration and superficial dry skin desquamation; nontender to the touch; the lesion was not warmer than other parts of the RUE; active ROM intact  Neurologic: awake and oriented x3  Skin:  erythematous R elbow as described above  Heme/Onc: no lymphadenopathy   Psychiatric:  awake, alert, appropriate mood                                11.0   6.84  )-----------( 304      ( 19 Jul 2023 06:43 )             33.6       07-19    129<L>  |  94<L>  |  15  ----------------------------<  94  3.9   |  19<L>  |  0.89    Ca    8.5      19 Jul 2023 06:43  Phos  2.3     07-17  Mg     2.0     07-17        Urinalysis Basic - ( 19 Jul 2023 06:43 )    Color: x / Appearance: x / SG: x / pH: x  Gluc: 94 mg/dL / Ketone: x  / Bili: x / Urobili: x   Blood: x / Protein: x / Nitrite: x   Leuk Esterase: x / RBC: x / WBC x   Sq Epi: x / Non Sq Epi: x / Bacteria: x        MICROBIOLOGY:        Culture - Blood (collected 07-18-23 @ 07:30)  Source: .Blood Blood-Peripheral  Preliminary Report (07-19-23 @ 11:01):    No growth at 24 hours    Culture - Blood (collected 07-18-23 @ 07:30)  Source: .Blood Blood-Peripheral  Preliminary Report (07-19-23 @ 11:01):    No growth at 24 hours    Culture - Urine (collected 07-17-23 @ 06:05)  Source: Clean Catch Clean Catch (Midstream)  Final Report (07-18-23 @ 07:14):    No growth                    RADIOLOGY:  imaging below personally reviewed  < from: CT Chest No Cont (07.19.23 @ 10:33) >    ACC: [id number] EXAM:  CT CHEST   ORDERED BY: NOVA MOREIRA     PROCEDURE DATE:  07/19/2023          INTERPRETATION:  INDICATION: Fever, AMS. Question pneumonia.    TECHNIQUE: Helical acquisition images of the chest without intravenous   contrast. Maximum intensity projection images were generated.    COMPARISON: CT chest 1/25/2023..    FINDINGS:    LUNGS/AIRWAYS/PLEURA: Multiple subpleural nodules most predominantly in   the right upper lobe, unchanged from CT in January 2023. Bilateral lower   lobe linear opacities. Trace bilateral pleural effusions.    LYMPH NODES/MEDIASTINUM: Lymph nodes unremarkable.    HEART/VASCULATURE: Trace pericardial effusion. Heart normal in size.   Coronary calcifications. Calcific plaque at distal aspect of innominate   artery.    UPPER ABDOMEN: Unchanged right adrenal gland.    BONES/SOFT TISSUES: Degenerative changes of left glenohumeral joint.  Soft tissues unremarkable.    IMPRESSION:    No pneumonia.  Opacities in the lower lobes are likely atelectasis.    --- End of Report ---           CRISTHIAN CARCAMO MD; Resident Radiologist  This document has been electronically signed.  WIL RAYMOND M.D., ATTENDING RADIOLOGIST  This document has been electronically signed. Jul 19 2023 12:29PM    < end of copied text >  < from: US Extremity Nonvasc Limited, Right (07.18.23 @ 16:27) >    ACC: [id number] EXAM:  US NONVASC EXT LTD RT   ORDERED BY: NOVA MOREIRA     PROCEDURE DATE:  07/18/2023          INTERPRETATION:  CLINICAL INFORMATION: 77-year-old male with cellulitis   right elbow, evaluate for abscess.    TECHNIQUE: Focused sonographic evaluation of the right elbow was   performed. Grayscale and color Doppler images were acquired.    COMPARISON: None    FINDINGS:  Focused examination of the right elbow in the area of erythema reveals    soft tissue thickening and edema withinthe subcutaneous tissues   overlying the elbow. No fluid collection or abscess within the soft   tissues is identified.  No significant hyperemia is demonstrated with   color Doppler.    There is a small collection of fluid in the olecranon fossa measuring   approximately 0.5 x 0.5 x 0.8 cm. compatible with a small elbow joint   effusion.    IMPRESSION:  Edema within the subcutaneous soft  tissues overlying the right elbow. No   fluid collection or abscess is identified within the soft tissues.    Small  amount of fluid within the olecranon fossa compatible with small    elbow joint effusion.    --- End of Report ---          BENJY MOSES MD; Fellow Radiology  This document has been electronically signed.  VALENTINA VU MD; Attending Radiologist  This document has been electronically signed. Jul 18 2023  5:33PM    < end of copied text >

## 2023-07-20 LAB
ANION GAP SERPL CALC-SCNC: 14 MMOL/L — SIGNIFICANT CHANGE UP (ref 5–17)
BASOPHILS # BLD AUTO: 0.07 K/UL — SIGNIFICANT CHANGE UP (ref 0–0.2)
BASOPHILS NFR BLD AUTO: 1.1 % — SIGNIFICANT CHANGE UP (ref 0–2)
BUN SERPL-MCNC: 14 MG/DL — SIGNIFICANT CHANGE UP (ref 7–23)
CALCIUM SERPL-MCNC: 8.7 MG/DL — SIGNIFICANT CHANGE UP (ref 8.4–10.5)
CHLORIDE SERPL-SCNC: 94 MMOL/L — LOW (ref 96–108)
CO2 SERPL-SCNC: 21 MMOL/L — LOW (ref 22–31)
CREAT SERPL-MCNC: 0.81 MG/DL — SIGNIFICANT CHANGE UP (ref 0.5–1.3)
EGFR: 90 ML/MIN/1.73M2 — SIGNIFICANT CHANGE UP
EOSINOPHIL # BLD AUTO: 0.59 K/UL — HIGH (ref 0–0.5)
EOSINOPHIL NFR BLD AUTO: 8.9 % — HIGH (ref 0–6)
GLUCOSE SERPL-MCNC: 89 MG/DL — SIGNIFICANT CHANGE UP (ref 70–99)
HCT VFR BLD CALC: 33.4 % — LOW (ref 39–50)
HGB BLD-MCNC: 10.7 G/DL — LOW (ref 13–17)
HIV 1+2 AB+HIV1 P24 AG SERPL QL IA: SIGNIFICANT CHANGE UP
IMM GRANULOCYTES NFR BLD AUTO: 0.5 % — SIGNIFICANT CHANGE UP (ref 0–0.9)
LYMPHOCYTES # BLD AUTO: 0.65 K/UL — LOW (ref 1–3.3)
LYMPHOCYTES # BLD AUTO: 9.8 % — LOW (ref 13–44)
MAGNESIUM SERPL-MCNC: 2.1 MG/DL — SIGNIFICANT CHANGE UP (ref 1.6–2.6)
MCHC RBC-ENTMCNC: 24.9 PG — LOW (ref 27–34)
MCHC RBC-ENTMCNC: 32 GM/DL — SIGNIFICANT CHANGE UP (ref 32–36)
MCV RBC AUTO: 77.9 FL — LOW (ref 80–100)
MONOCYTES # BLD AUTO: 0.61 K/UL — SIGNIFICANT CHANGE UP (ref 0–0.9)
MONOCYTES NFR BLD AUTO: 9.2 % — SIGNIFICANT CHANGE UP (ref 2–14)
NEUTROPHILS # BLD AUTO: 4.7 K/UL — SIGNIFICANT CHANGE UP (ref 1.8–7.4)
NEUTROPHILS NFR BLD AUTO: 70.5 % — SIGNIFICANT CHANGE UP (ref 43–77)
NRBC # BLD: 0 /100 WBCS — SIGNIFICANT CHANGE UP (ref 0–0)
OSMOLALITY SERPL: 267 MOSMOL/KG — LOW (ref 280–301)
PHOSPHATE SERPL-MCNC: 3.3 MG/DL — SIGNIFICANT CHANGE UP (ref 2.5–4.5)
PLATELET # BLD AUTO: 314 K/UL — SIGNIFICANT CHANGE UP (ref 150–400)
POTASSIUM SERPL-MCNC: 3.5 MMOL/L — SIGNIFICANT CHANGE UP (ref 3.5–5.3)
POTASSIUM SERPL-SCNC: 3.5 MMOL/L — SIGNIFICANT CHANGE UP (ref 3.5–5.3)
RBC # BLD: 4.29 M/UL — SIGNIFICANT CHANGE UP (ref 4.2–5.8)
RBC # FLD: 15.9 % — HIGH (ref 10.3–14.5)
SODIUM SERPL-SCNC: 129 MMOL/L — LOW (ref 135–145)
WBC # BLD: 6.65 K/UL — SIGNIFICANT CHANGE UP (ref 3.8–10.5)
WBC # FLD AUTO: 6.65 K/UL — SIGNIFICANT CHANGE UP (ref 3.8–10.5)

## 2023-07-20 PROCEDURE — 99233 SBSQ HOSP IP/OBS HIGH 50: CPT

## 2023-07-20 PROCEDURE — 99223 1ST HOSP IP/OBS HIGH 75: CPT

## 2023-07-20 PROCEDURE — 99232 SBSQ HOSP IP/OBS MODERATE 35: CPT

## 2023-07-20 PROCEDURE — 73221 MRI JOINT UPR EXTREM W/O DYE: CPT | Mod: 26,RT

## 2023-07-20 RX ADMIN — BUDESONIDE AND FORMOTEROL FUMARATE DIHYDRATE 2 PUFF(S): 160; 4.5 AEROSOL RESPIRATORY (INHALATION) at 18:59

## 2023-07-20 RX ADMIN — Medication 2 SPRAY(S): at 06:39

## 2023-07-20 RX ADMIN — Medication 650 MILLIGRAM(S): at 03:21

## 2023-07-20 RX ADMIN — Medication 650 MILLIGRAM(S): at 02:33

## 2023-07-20 RX ADMIN — LACOSAMIDE 200 MILLIGRAM(S): 50 TABLET ORAL at 06:39

## 2023-07-20 RX ADMIN — HEPARIN SODIUM 5000 UNIT(S): 5000 INJECTION INTRAVENOUS; SUBCUTANEOUS at 21:26

## 2023-07-20 RX ADMIN — LACOSAMIDE 200 MILLIGRAM(S): 50 TABLET ORAL at 19:01

## 2023-07-20 RX ADMIN — HEPARIN SODIUM 5000 UNIT(S): 5000 INJECTION INTRAVENOUS; SUBCUTANEOUS at 06:38

## 2023-07-20 RX ADMIN — LEVETIRACETAM 500 MILLIGRAM(S): 250 TABLET, FILM COATED ORAL at 06:39

## 2023-07-20 RX ADMIN — HEPARIN SODIUM 5000 UNIT(S): 5000 INJECTION INTRAVENOUS; SUBCUTANEOUS at 14:52

## 2023-07-20 RX ADMIN — LEVETIRACETAM 500 MILLIGRAM(S): 250 TABLET, FILM COATED ORAL at 19:01

## 2023-07-20 RX ADMIN — Medication 2 SPRAY(S): at 18:58

## 2023-07-20 RX ADMIN — BUDESONIDE AND FORMOTEROL FUMARATE DIHYDRATE 2 PUFF(S): 160; 4.5 AEROSOL RESPIRATORY (INHALATION) at 06:39

## 2023-07-20 RX ADMIN — MONTELUKAST 10 MILLIGRAM(S): 4 TABLET, CHEWABLE ORAL at 14:52

## 2023-07-20 NOTE — CONSULT NOTE ADULT - ASSESSMENT
R elbow plaque  - fluid collection on US, fish tank exposure  - Patient has had several biopsies and cultures and treatments by outside dermatology provider- the most recent 2 weeks ago.  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 Menard. Awaiting records.   - Will follow up tomorrow and if records/results not obtainable or insufficient, will re-biopsy inpatient and send for path, mycobacterial culture and fungal culture per ID request to  r/o NTM, including mycobacterium marinum.      Discussed with Dr. Menard.     The patient's chart was reviewed in addition to photos of the rash taken by the primary team with the permission of the patient.  Patient was seen at bedside  with the dermatology attending Dr. Chicas.  Recommendations were communicated with the primary team.  Please page [phone] for further related questions.    Jodie Islas MD  Resident Physician, PGY3  [facility]  Pager: [phone]

## 2023-07-20 NOTE — DIETITIAN INITIAL EVALUATION ADULT - REASON INDICATOR FOR ASSESSMENT
Consult for "MST score 2 or >"  Source: chart, patient, patient's family at bedside  Chart reviewed, events noted

## 2023-07-20 NOTE — PROGRESS NOTE ADULT - PROBLEM SELECTOR PLAN 2
fever 102.3 7/18 unclear etiology poss cellulits? 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 Derm consulted for biopsy f/u path, fungal cx and NTM cx  - monitor off abx for now pr ID   - blood cx and urine cx neg  - I was unable to reach outpt Dermatologist Junior Talley [phone], however d/w wife who will bring outpt biopsy rec tomorrow

## 2023-07-20 NOTE — DIETITIAN INITIAL EVALUATION ADULT - PERTINENT LABORATORY DATA
14-Jun-2019 15:12 07-20    129<L>  |  94<L>  |  14  ----------------------------<  89  3.5   |  21<L>  |  0.81    Ca    8.7      20 Jul 2023 07:12  Phos  3.3     07-20  Mg     2.1     07-20

## 2023-07-20 NOTE — DIETITIAN INITIAL EVALUATION ADULT - ENERGY INTAKE
Adequate (%) Patient and family reporting overall fair-good PO intake during admission, does very well with breakfast/lunch, intermittently will not be as hungry for dinner but will still make good attempt with meal served. Food preferences obtained.

## 2023-07-20 NOTE — PROGRESS NOTE ADULT - SUBJECTIVE AND OBJECTIVE BOX
Follow Up:    Arm infection    Interval History/ROS:  VSS ON. Patient was seen and examined at bedside. Denies fever, chills, nausea, vomiting, SOB, or coughs. No diarrhea.    Allergies  No Known Allergies        ANTIMICROBIALS:      OTHER MEDS:  MEDICATIONS  (STANDING):  acetaminophen     Tablet .. 650 every 6 hours PRN  aluminum hydroxide/magnesium hydroxide/simethicone Suspension 30 every 4 hours PRN  budesonide 160 MICROgram(s)/formoterol 4.5 MICROgram(s) Inhaler 2 two times a day  heparin   Injectable 5000 every 8 hours  lacosamide 200 two times a day  levETIRAcetam 500 two times a day  melatonin 3 at bedtime PRN  montelukast 10 daily  ondansetron Injectable 4 every 8 hours PRN      Vital Signs Last 24 Hrs  T(C): 36.3 (20 Jul 2023 11:39), Max: 37.1 (20 Jul 2023 05:50)  T(F): 97.3 (20 Jul 2023 11:39), Max: 98.8 (20 Jul 2023 05:50)  HR: 70 (20 Jul 2023 11:39) (70 - 78)  BP: 97/57 (20 Jul 2023 11:39) (97/57 - 115/69)  BP(mean): --  RR: 18 (20 Jul 2023 11:39) (18 - 18)  SpO2: 95% (20 Jul 2023 11:39) (95% - 97%)    Parameters below as of 20 Jul 2023 11:39  Patient On (Oxygen Delivery Method): room air        PHYSICAL EXAM:  Constitutional: non-toxic, no distress  Cardiovascular: normal S1, S2, no murmur, no edema  Respiratory: No crackles bilaterally anterior and axillary lung fields. no wheezes, no rales  GI:  soft, non-tender, normal bowel sounds  : no weaver  Musculoskeletal: R elbow slightly erythematous with firm induration and superficial dry skin desquamation; nontender to the touch; the lesion was not warmer than other parts of the RUE; active ROM intact; overall no significant change from yesterday  Neurologic: awake and oriented x3  Skin:  erythematous R elbow as described above  Psychiatric:  awake, alert, appropriate mood                                  10.7   6.65  )-----------( 314      ( 20 Jul 2023 07:10 )             33.4       07-20    129<L>  |  94<L>  |  14  ----------------------------<  89  3.5   |  21<L>  |  0.81    Ca    8.7      20 Jul 2023 07:12  Phos  3.3     07-20  Mg     2.1     07-20        Urinalysis Basic - ( 20 Jul 2023 07:12 )    Color: x / Appearance: x / SG: x / pH: x  Gluc: 89 mg/dL / Ketone: x  / Bili: x / Urobili: x   Blood: x / Protein: x / Nitrite: x   Leuk Esterase: x / RBC: x / WBC x   Sq Epi: x / Non Sq Epi: x / Bacteria: x        MICROBIOLOGY:  v  .Blood Blood-Peripheral  07-18-23   No growth at 48 Hours  --  --      Clean Catch Clean Catch (Midstream)  07-17-23   No growth  --  --      Clean Catch Clean Catch (Midstream)  07-08-23   <10,000 CFU/mL Normal Urogenital Swapna  --  --      .Blood Blood-Peripheral  07-08-23   No growth at 5 days  --  --      .Blood Blood-Peripheral  07-08-23   No growth at 5 days  --  --                RADIOLOGY:  No new pertinent imaging.

## 2023-07-20 NOTE — PROGRESS NOTE ADULT - ASSESSMENT
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 R elbow erythema noted, with US R elbow with 0.5 x 0.5 x 0.8 cm olecranon fossa fluid collection.    #R elbow SSTI  #Fever  #Environmental exposure  #Elevated ESR and CRP  - Nontoxic appearing. No subjective complaints. Nontender elbow, ROM intact. Subacute to chronic infection.  - C/f NTM infection, including Mycobacterium marinum, due to exposure to fish tank and pond.  - Note that Mycobacterium workup and treatment require a solid diagnosis and susceptibility before committing to a treatment (usually very long - months to years), and many times with combination of meds.  - ESR and CRP elevated. Would rule out bone involvement.    Recommendations:  - Dermatology for biopsy of the lesion (path) and mycobacterial culture. Please specify r/o NTM, including mycobacterium marinum. Also send fungal culture  - Please obtain culture and biopsy results from OSH. Also would need information re: past treatment regimens for recurrent arm infection.  - No skin-directed treatment at this time unless there is a diagnosis or concerning signs/symptoms that warrants acute intervention.  - MRI of the R elbow to look for bone involvement  - F/u BCx and UCx.  - F/u Fungitell and galactomannan  - Can send urine histo  - CBC with diff    Plan discussed with primary team attending Dr. Menard.    Thank you for this consult. Inpatient ID team will follow.    Galdino Thompson MD, PhD  Attending Physician  Division of Infectious Diseases  Department of Medicine    Please contact through MS Teams message.  Office: [phone] (after 5 PM or weekend)

## 2023-07-20 NOTE — DIETITIAN INITIAL EVALUATION ADULT - PERSON TAUGHT/METHOD
adequate caloric/protein intake, adequate fluid intake, food preferences/family at bedside/verbal instruction/teach back - (Patient repeats in own words)/patient instructed

## 2023-07-20 NOTE — PROGRESS NOTE ADULT - SUBJECTIVE AND OBJECTIVE BOX
Patient is a 77y old  Male who presents with a chief complaint of unsteady gait (19 Jul 2023 14:28)      SUBJECTIVE / OVERNIGHT EVENTS: Patient seen and examined at bedside. States that he feels well denies any complaints. No acute events overnight     ROS:  All other review of systems negative    Allergies    No Known Allergies    Intolerances        MEDICATIONS  (STANDING):  budesonide 160 MICROgram(s)/formoterol 4.5 MICROgram(s) Inhaler 2 Puff(s) Inhalation two times a day  fluticasone propionate 50 MICROgram(s)/spray Nasal Spray 2 Spray(s) Both Nostrils two times a day  heparin   Injectable 5000 Unit(s) SubCutaneous every 8 hours  lacosamide 200 milliGRAM(s) Oral two times a day  levETIRAcetam 500 milliGRAM(s) Oral two times a day  montelukast 10 milliGRAM(s) Oral daily    MEDICATIONS  (PRN):  acetaminophen     Tablet .. 650 milliGRAM(s) Oral every 6 hours PRN Temp greater or equal to 38C (100.4F), Mild Pain (1 - 3)  aluminum hydroxide/magnesium hydroxide/simethicone Suspension 30 milliLiter(s) Oral every 4 hours PRN Dyspepsia  melatonin 3 milliGRAM(s) Oral at bedtime PRN Insomnia  ondansetron Injectable 4 milliGRAM(s) IV Push every 8 hours PRN Nausea and/or Vomiting      Vital Signs Last 24 Hrs  T(C): 36.3 (20 Jul 2023 11:39), Max: 37.1 (20 Jul 2023 05:50)  T(F): 97.3 (20 Jul 2023 11:39), Max: 98.8 (20 Jul 2023 05:50)  HR: 70 (20 Jul 2023 11:39) (70 - 78)  BP: 97/57 (20 Jul 2023 11:39) (97/57 - 115/69)  BP(mean): --  RR: 18 (20 Jul 2023 11:39) (18 - 18)  SpO2: 95% (20 Jul 2023 11:39) (95% - 97%)    Parameters below as of 20 Jul 2023 11:39  Patient On (Oxygen Delivery Method): room air      CAPILLARY BLOOD GLUCOSE        I&O's Summary    19 Jul 2023 07:01  -  20 Jul 2023 07:00  --------------------------------------------------------  IN: 1380 mL / OUT: 200 mL / NET: 1180 mL        PHYSICAL EXAM:  GENERAL: NAD, well-developed  HEAD:  Atraumatic, Normocephalic  EYES: EOMI, PERRLA, conjunctiva and sclera clear  NECK: Supple, No JVD  CHEST/LUNG: Clear to auscultation bilaterally; No wheeze  HEART: Regular rate and rhythm; No murmurs, rubs, or gallops  ABDOMEN: Soft, Nontender, Nondistended; Bowel sounds present  EXTREMITIES:  right elbow erythema unchanged   NEUROLOGY: AAOx3, non-focal  PSYCH: calm  SKIN: No rashes or lesions    LABS:                        10.7   6.65  )-----------( 314      ( 20 Jul 2023 07:10 )             33.4     07-20    129<L>  |  94<L>  |  14  ----------------------------<  89  3.5   |  21<L>  |  0.81    Ca    8.7      20 Jul 2023 07:12  Phos  3.3     07-20  Mg     2.1     07-20            Urinalysis Basic - ( 20 Jul 2023 07:12 )    Color: x / Appearance: x / SG: x / pH: x  Gluc: 89 mg/dL / Ketone: x  / Bili: x / Urobili: x   Blood: x / Protein: x / Nitrite: x   Leuk Esterase: x / RBC: x / WBC x   Sq Epi: x / Non Sq Epi: x / Bacteria: x        RADIOLOGY & ADDITIONAL TESTS:    Care Discussed with Consultants/Other Providers: ID and Medicine ACP    Case Discussed with wife over the phone and Brother at bedside

## 2023-07-20 NOTE — DIETITIAN INITIAL EVALUATION ADULT - OTHER INFO
NKFA per patient. Patient's height noted as 5'6" on file; patient and family report his height to be 5'3". Patient states he was recently weighed as 142lbs in the past week and has not had any significant weight fluctuations PTA. Will continue to monitor weight trend.    Hyponatremia noted. Management per team, monitor need for fluid restriction. Prior hypophosphatemia 7/17, WNL today.

## 2023-07-20 NOTE — DIETITIAN INITIAL EVALUATION ADULT - REASON FOR ADMISSION
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 Patient presents to [facility] w/ inability to ambulate. Admitted for generalized weakness likely 2/2 keppra toxicity per MD.

## 2023-07-20 NOTE — DIETITIAN INITIAL EVALUATION ADULT - ADD RECOMMEND
occasional use 1. continue current diet as tolerated of: regular diet  2. encourage PO intake, protein source with each meal  3. monitor PO intake, weight trend, electrolytes, blood glucose levels, labs, BMs

## 2023-07-20 NOTE — DIETITIAN INITIAL EVALUATION ADULT - ORAL INTAKE PTA/DIET HISTORY
Patient and family reports patient normally eats well PTA w/ no issues. Denied chewing/swallowing impairment or nausea/vomiting. Normally eats at least 3 meals a day and regularly exercises daily.

## 2023-07-20 NOTE — DIETITIAN INITIAL EVALUATION ADULT - NUTRITION CONSULT
[employer] message sent to patient with comments by provider.   Closing this encounter
MD released with comments
Results sent via [employer] with comments.
yes

## 2023-07-20 NOTE — CONSULT NOTE ADULT - ATTENDING COMMENTS
HPI as per resident note, personally verified by me. Patient with gait unsteadiness and lethargy following an increase in his Keppra dose to 750mg BID. He was also found to have hyponatremia. No recent falls, trauma, or focal neurologic deficits. He denies any recent seizures.    Neurologic exam as per resident note with additions as below:  AAO x2.5 (knew it was 7/2023 but not rest of date), speech fluent  CN's II-XII intact with R > L end gaze horizontal fatigable nystagmus  Strength 5/5 all  Sens intact all  FtN intact b/l. BUE intention tremor with postural component  Downgoing b/l plantar response    TSH WNL, UA (-)    A/P:  Epilepsy, not intractable, without status epilepticus  Other amnesia  Gait unsteadiness  Abnormal MRI brain  Hyponatremia (Na dec 129)    - Lethargy and gait unsteadiness could be secondary to toxic/metabolic process (low Na) versus iatrogenic (increased Keppra dose). He denies any recent seizures and no new focal neurologic deficits. He feels symptoms are improving  - Continue AED's with Keppra ER 500mg PO BID and Vimpat 200mg PO BID  - PT as tolerated  - Continue to address above medical problems, as you are doing  - Will continue to follow patient with you, as needed
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 Will perform skin biopsy for mycobacterial and fungal culture. M. Marinum favored.

## 2023-07-20 NOTE — DIETITIAN INITIAL EVALUATION ADULT - PERTINENT MEDS FT
MEDICATIONS  (STANDING):  budesonide 160 MICROgram(s)/formoterol 4.5 MICROgram(s) Inhaler 2 Puff(s) Inhalation two times a day  fluticasone propionate 50 MICROgram(s)/spray Nasal Spray 2 Spray(s) Both Nostrils two times a day  heparin   Injectable 5000 Unit(s) SubCutaneous every 8 hours  lacosamide 200 milliGRAM(s) Oral two times a day  levETIRAcetam 500 milliGRAM(s) Oral two times a day  montelukast 10 milliGRAM(s) Oral daily    MEDICATIONS  (PRN):  acetaminophen     Tablet .. 650 milliGRAM(s) Oral every 6 hours PRN Temp greater or equal to 38C (100.4F), Mild Pain (1 - 3)  aluminum hydroxide/magnesium hydroxide/simethicone Suspension 30 milliLiter(s) Oral every 4 hours PRN Dyspepsia  melatonin 3 milliGRAM(s) Oral at bedtime PRN Insomnia  ondansetron Injectable 4 milliGRAM(s) IV Push every 8 hours PRN Nausea and/or Vomiting

## 2023-07-20 NOTE — CONSULT NOTE ADULT - SUBJECTIVE AND OBJECTIVE BOX
HPI:  77-year-old male with past medical history of focal seizures (on Keppra and Vimpat), left amygdala/hippocampus lesion (concern for low-grade glioma neurosurgery) who presents with inability to ambulate.     The patient was recently admitted to EMU from 7/6 - 7/11 for encephalopathy. EEG showed focal dysfunction in the left temporal region, mild nonspecific diffuse cerebral dysfunction, and no epileptiform discharge or seizures were recorded.    He was discharged with home physical therapy. Pt states he was in his normal state of health, ambulating without assistive devices as normal. However on Saturday wife noted that the patient felt weaker compared to his baseline. The day of presentation, the patient states he was unable to get up from the toilet. Wife had to help the patient up to the bed. Pt later on was unable to get up from the bed on his own. Pt endorsed headache but otherwise no feelings of disorientation or confusion. No loss of consciousness. No back pain. No numbness or tingling.    ED interventions: acetaminophen 975mg PO. Neurology consulted. (17 Jul 2023 14:02)    Derm HPI:    77-yo M w/ PMH of focal seizures on Keppra/Vimpat, COPD/asthma, and recent admission 7/6-11 for encephalopathy, admitted with inability to ambulate, c/b fever to 102.3, w/ ESR 71 and  w/o leukocytosis. R elbow erythema noted, with US R elbow with 0.5 x 0.5 x 0.8 cm olecranon fossa fluid collection.    Derm team consulted for evaluation and consideration of biopsy of R elbow plaque.     Patient and his wife report this area on the R elbow has had a rash on and off for years, asymptomatic. Report hx of exposure to fish tanks.     Patient has had several biopsies and cultures and treatments by outside dermatology provider- the most recent 2 weeks ago. Per wife they called the outpatient derm clinic and were told the diagnosis was mycobacterium and wife reports office stated records would be sent to both the derm resident and the primary team attending Dr. Menard.              PAST MEDICAL & SURGICAL HISTORY:  Asthma      Sinusitis      Focal seizures      COPD with asthma      H/O knee surgery  BILATERAL SCOPE      Status post left rotator cuff repair          Review of Systems:  REVIEW OF SYSTEMS      General: see HPI	    Skin/Breast: see HPI  	  Ophthalmologic: no eye pain or change in vision  	  ENMT: no dysphagia or change in hearing    Respiratory and Thorax: no SOB or cough  	  Cardiovascular: no palpitations or chest pain    Gastrointestinal: no abdominal pain or blood in stool     Genitourinary: no dysuria or frequency    Musculoskeletal: see HPI    Neurological: see HPI    MEDICATIONS  (STANDING):  budesonide 160 MICROgram(s)/formoterol 4.5 MICROgram(s) Inhaler 2 Puff(s) Inhalation two times a day  fluticasone propionate 50 MICROgram(s)/spray Nasal Spray 2 Spray(s) Both Nostrils two times a day  heparin   Injectable 5000 Unit(s) SubCutaneous every 8 hours  lacosamide 200 milliGRAM(s) Oral two times a day  levETIRAcetam 500 milliGRAM(s) Oral two times a day  montelukast 10 milliGRAM(s) Oral daily    ALLERGIES: No Known Allergies        SOCIAL HISTORY:  ____________________________________  Social History:  Lives at home with wife. Independent with ADLs. Typically ambulates without assistive devices.  FAMILY HISTORY:        VITAL SIGNS LAST 24 HOURS:  T(F): 98.4 (07-20 @ 20:17), Max: 98.8 (07-20 @ 05:50)  HR: 78 (07-20 @ 20:17) (70 - 78)  BP: 108/66 (07-20 @ 20:17) (97/57 - 112/70)  RR: 20 (07-20 @ 20:17) (18 - 20)    PHYSICAL EXAM:     The patient was alert, comfortable, and in no  apparent distress.  There was no visible lymphadenopathy.  Conjunctiva were non injected  There was no clubbing or edema of extremities.  The scalp, hair, face, eyebrows, lips,  neck, chest,    extremities X 4, nails were examined.  There was no hyperhidrosis or bromhidrosis.    Of note on skin exam:   pink scaly plaque on R elbow  ____________________________________    LABS:                        10.7   6.65  )-----------( 314      ( 20 Jul 2023 07:10 )             33.4     07-20    129<L>  |  94<L>  |  14  ----------------------------<  89  3.5   |  21<L>  |  0.81    Ca    8.7      20 Jul 2023 07:12  Phos  3.3     07-20  Mg     2.1     07-20        Urinalysis Basic - ( 20 Jul 2023 07:12 )    Color: x / Appearance: x / SG: x / pH: x  Gluc: 89 mg/dL / Ketone: x  / Bili: x / Urobili: x   Blood: x / Protein: x / Nitrite: x   Leuk Esterase: x / RBC: x / WBC x   Sq Epi: x / Non Sq Epi: x / Bacteria: x

## 2023-07-20 NOTE — DIETITIAN INITIAL EVALUATION ADULT - PROBLEM SELECTOR PLAN 2
EMS - as above, decrease keppra to 500 mg BID  and continue home dose lacosamide 200 mg BID  - pt states he has outpatient appointment with his neurologist on Wed 7/19

## 2023-07-21 ENCOUNTER — RESULT REVIEW (OUTPATIENT)
Age: 78
End: 2023-07-21

## 2023-07-21 PROBLEM — J44.9 CHRONIC OBSTRUCTIVE PULMONARY DISEASE, UNSPECIFIED: Chronic | Status: ACTIVE | Noted: 2023-07-17

## 2023-07-21 PROBLEM — R56.9 UNSPECIFIED CONVULSIONS: Chronic | Status: ACTIVE | Noted: 2023-07-17

## 2023-07-21 LAB
ANION GAP SERPL CALC-SCNC: 11 MMOL/L — SIGNIFICANT CHANGE UP (ref 5–17)
BUN SERPL-MCNC: 14 MG/DL — SIGNIFICANT CHANGE UP (ref 7–23)
CALCIUM SERPL-MCNC: 8.7 MG/DL — SIGNIFICANT CHANGE UP (ref 8.4–10.5)
CHLORIDE SERPL-SCNC: 98 MMOL/L — SIGNIFICANT CHANGE UP (ref 96–108)
CO2 SERPL-SCNC: 23 MMOL/L — SIGNIFICANT CHANGE UP (ref 22–31)
CREAT SERPL-MCNC: 0.84 MG/DL — SIGNIFICANT CHANGE UP (ref 0.5–1.3)
EGFR: 89 ML/MIN/1.73M2 — SIGNIFICANT CHANGE UP
GLUCOSE SERPL-MCNC: 98 MG/DL — SIGNIFICANT CHANGE UP (ref 70–99)
HCT VFR BLD CALC: 31.3 % — LOW (ref 39–50)
HGB BLD-MCNC: 10.2 G/DL — LOW (ref 13–17)
MCHC RBC-ENTMCNC: 25.1 PG — LOW (ref 27–34)
MCHC RBC-ENTMCNC: 32.6 GM/DL — SIGNIFICANT CHANGE UP (ref 32–36)
MCV RBC AUTO: 76.9 FL — LOW (ref 80–100)
NRBC # BLD: 0 /100 WBCS — SIGNIFICANT CHANGE UP (ref 0–0)
PLATELET # BLD AUTO: 310 K/UL — SIGNIFICANT CHANGE UP (ref 150–400)
POTASSIUM SERPL-MCNC: 3.6 MMOL/L — SIGNIFICANT CHANGE UP (ref 3.5–5.3)
POTASSIUM SERPL-SCNC: 3.6 MMOL/L — SIGNIFICANT CHANGE UP (ref 3.5–5.3)
RBC # BLD: 4.07 M/UL — LOW (ref 4.2–5.8)
RBC # FLD: 15.8 % — HIGH (ref 10.3–14.5)
SODIUM SERPL-SCNC: 132 MMOL/L — LOW (ref 135–145)
VIT B1 SERPL-MCNC: 71.4 NMOL/L — SIGNIFICANT CHANGE UP (ref 66.5–200)
WBC # BLD: 6.39 K/UL — SIGNIFICANT CHANGE UP (ref 3.8–10.5)
WBC # FLD AUTO: 6.39 K/UL — SIGNIFICANT CHANGE UP (ref 3.8–10.5)

## 2023-07-21 PROCEDURE — 88312 SPECIAL STAINS GROUP 1: CPT | Mod: 26

## 2023-07-21 PROCEDURE — 99232 SBSQ HOSP IP/OBS MODERATE 35: CPT

## 2023-07-21 PROCEDURE — 88305 TISSUE EXAM BY PATHOLOGIST: CPT | Mod: 26

## 2023-07-21 PROCEDURE — 11104 PUNCH BX SKIN SINGLE LESION: CPT

## 2023-07-21 PROCEDURE — 88313 SPECIAL STAINS GROUP 2: CPT | Mod: 26

## 2023-07-21 PROCEDURE — 93971 EXTREMITY STUDY: CPT | Mod: 26,RT

## 2023-07-21 RX ORDER — CEPHALEXIN 500 MG
500 CAPSULE ORAL EVERY 12 HOURS
Refills: 0 | Status: DISCONTINUED | OUTPATIENT
Start: 2023-07-21 | End: 2023-07-22

## 2023-07-21 RX ADMIN — HEPARIN SODIUM 5000 UNIT(S): 5000 INJECTION INTRAVENOUS; SUBCUTANEOUS at 22:39

## 2023-07-21 RX ADMIN — Medication 650 MILLIGRAM(S): at 01:43

## 2023-07-21 RX ADMIN — LACOSAMIDE 200 MILLIGRAM(S): 50 TABLET ORAL at 18:33

## 2023-07-21 RX ADMIN — Medication 2 SPRAY(S): at 05:45

## 2023-07-21 RX ADMIN — Medication 500 MILLIGRAM(S): at 18:33

## 2023-07-21 RX ADMIN — BUDESONIDE AND FORMOTEROL FUMARATE DIHYDRATE 2 PUFF(S): 160; 4.5 AEROSOL RESPIRATORY (INHALATION) at 05:45

## 2023-07-21 RX ADMIN — LACOSAMIDE 200 MILLIGRAM(S): 50 TABLET ORAL at 05:45

## 2023-07-21 RX ADMIN — Medication 650 MILLIGRAM(S): at 02:39

## 2023-07-21 RX ADMIN — LEVETIRACETAM 500 MILLIGRAM(S): 250 TABLET, FILM COATED ORAL at 18:33

## 2023-07-21 RX ADMIN — HEPARIN SODIUM 5000 UNIT(S): 5000 INJECTION INTRAVENOUS; SUBCUTANEOUS at 05:45

## 2023-07-21 RX ADMIN — BUDESONIDE AND FORMOTEROL FUMARATE DIHYDRATE 2 PUFF(S): 160; 4.5 AEROSOL RESPIRATORY (INHALATION) at 18:30

## 2023-07-21 RX ADMIN — MONTELUKAST 10 MILLIGRAM(S): 4 TABLET, CHEWABLE ORAL at 12:22

## 2023-07-21 RX ADMIN — LEVETIRACETAM 500 MILLIGRAM(S): 250 TABLET, FILM COATED ORAL at 05:45

## 2023-07-21 RX ADMIN — Medication 2 SPRAY(S): at 18:30

## 2023-07-21 NOTE — PROGRESS NOTE ADULT - SUBJECTIVE AND OBJECTIVE BOX
Patient is a 77y old  Male who presents with a chief complaint of unsteady gait (20 Jul 2023 19:30)      SUBJECTIVE / OVERNIGHT EVENTS: Patient seen and examined at bedside. States that he feels well no acute events overnight.     ROS:  All other review of systems negative    Allergies    No Known Allergies    Intolerances        MEDICATIONS  (STANDING):  budesonide 160 MICROgram(s)/formoterol 4.5 MICROgram(s) Inhaler 2 Puff(s) Inhalation two times a day  cephalexin 500 milliGRAM(s) Oral every 12 hours  fluticasone propionate 50 MICROgram(s)/spray Nasal Spray 2 Spray(s) Both Nostrils two times a day  heparin   Injectable 5000 Unit(s) SubCutaneous every 8 hours  lacosamide 200 milliGRAM(s) Oral two times a day  levETIRAcetam 500 milliGRAM(s) Oral two times a day  montelukast 10 milliGRAM(s) Oral daily    MEDICATIONS  (PRN):  acetaminophen     Tablet .. 650 milliGRAM(s) Oral every 6 hours PRN Temp greater or equal to 38C (100.4F), Mild Pain (1 - 3)  aluminum hydroxide/magnesium hydroxide/simethicone Suspension 30 milliLiter(s) Oral every 4 hours PRN Dyspepsia  melatonin 3 milliGRAM(s) Oral at bedtime PRN Insomnia  ondansetron Injectable 4 milliGRAM(s) IV Push every 8 hours PRN Nausea and/or Vomiting      Vital Signs Last 24 Hrs  T(C): 36.4 (21 Jul 2023 12:34), Max: 36.9 (20 Jul 2023 20:17)  T(F): 97.6 (21 Jul 2023 12:34), Max: 98.4 (20 Jul 2023 20:17)  HR: 69 (21 Jul 2023 12:34) (69 - 78)  BP: 100/63 (21 Jul 2023 12:34) (100/63 - 112/67)  BP(mean): --  RR: 18 (21 Jul 2023 12:34) (18 - 20)  SpO2: 100% (21 Jul 2023 12:34) (96% - 100%)    Parameters below as of 21 Jul 2023 12:34  Patient On (Oxygen Delivery Method): room air      CAPILLARY BLOOD GLUCOSE        I&O's Summary    20 Jul 2023 07:01  -  21 Jul 2023 07:00  --------------------------------------------------------  IN: 720 mL / OUT: 843 mL / NET: -123 mL    21 Jul 2023 07:01  -  21 Jul 2023 12:57  --------------------------------------------------------  IN: 320 mL / OUT: 0 mL / NET: 320 mL        PHYSICAL EXAM:  GENERAL: NAD, well-developed  HEAD:  Atraumatic, Normocephalic  EYES: EOMI, PERRLA, conjunctiva and sclera clear  NECK: Supple, No JVD  CHEST/LUNG: Clear to auscultation bilaterally; No wheeze  HEART: Regular rate and rhythm; No murmurs, rubs, or gallops  ABDOMEN: Soft, Nontender, Nondistended; Bowel sounds present  EXTREMITIES:  right elbow + erythema unchanged   NEUROLOGY: AAOx3, non-focal  PSYCH: calm  SKIN: No rashes or lesions    LABS:                        10.2   6.39  )-----------( 310      ( 21 Jul 2023 07:08 )             31.3     07-21    132<L>  |  98  |  14  ----------------------------<  98  3.6   |  23  |  0.84    Ca    8.7      21 Jul 2023 07:08  Phos  3.3     07-20  Mg     2.1     07-20            Urinalysis Basic - ( 21 Jul 2023 07:08 )    Color: x / Appearance: x / SG: x / pH: x  Gluc: 98 mg/dL / Ketone: x  / Bili: x / Urobili: x   Blood: x / Protein: x / Nitrite: x   Leuk Esterase: x / RBC: x / WBC x   Sq Epi: x / Non Sq Epi: x / Bacteria: x        RADIOLOGY & ADDITIONAL TESTS:  MRI: neg for OM     Care Discussed with Consultants/Other Providers: Medicine ACP, ID and Derm    Case Discussed with keegan

## 2023-07-21 NOTE — PROGRESS NOTE ADULT - PROBLEM SELECTOR PLAN 4
Moderate hyponatremia, likely contributing to the patient's generalized weakness  - repeat BMP now  - will consider giving fluid bolus if pt continues to be hyponatremic
mild hyponatremia, likely contributing to the patient's generalized weakness  - improving 132  - urine lytes reviewed: SIADH limit fluids 1L daily   - monitor BMP
mild hyponatremia, likely contributing to the patient's generalized weakness  - Na 129  - check urine Na, Osm and serum osm  - monitor BMP
mild hyponatremia, likely contributing to the patient's generalized weakness  - Na 129  - urine lytes reviewed: SIADH limit fluids 1L daily   - monitor BMP

## 2023-07-21 NOTE — CHART NOTE - NSCHARTNOTEFT_GEN_A_CORE
Dermatology resident saw patient at bedside today. He was accompanied by family members. Earlier today I spoke with the patient's outpatient dermatology clinic who told me the patient has a history of a skin biopsy March 2020 consistent with mycobacterium infection. However, culture done at the time was negative (for AF and fungal per the PA I spoke with).  Cultures were also done in the summer of 2022 (negative), and again repeated in June 2023. The recent June 2023 biopsy and cultures show Acid fast bacilli, pending identification of organism. I asked the PA I spoke with if they could please call the micro lab and see if sensitivities could be added. After a discussion between the derm attending (Dr. Chicas), ID attending and primary team attending, we recommend repeating a skin biopsy of the pink scaly plaque on the R elbow and also repeating a tissue culture for AF (and I specified to the micro lab to please evaluate for Mycobacterium marinum) and a separate biopsy for fungal cx. I discussed this with the patient and family at bedside and they understood and agreed to proceed.     Dermatology Punch Biopsy Procedure Note    After risks and benefits of procedure including bleeding, infection and scar were reviewed (consents including photo consent reviewed, signed and in chart), allergies were reviewed and time out performed. Written consent given by the patient.    Area cleaned with rubbing alcohol and anesthetized with lidocaine and epinephrine.  #3, 3mm punch biopsies were performed to  R elbow (x1 for H&E, x1 for AF culture, x1 for fungal culture) hemostasis achieved with a single dissolvable chromic gut suture with pressure dressing placed. Wound care reviewed with patient and team. Biopsy site should remain covered with pressure bandage for 24-48 hours. Then apply Vaseline to biopsy site daily and cover with bandage until healed.    Patient's wife reports he has an appointment scheduled with the ID attending for follow up and plans to follow up with their community dermatologist.     On discharge please provide the patient with our office information for questions or for follow up appointment scheduling.   [facility] at [place] [address], [place], [place] [id number] [phone]    The patient's chart was reviewed in addition to photos of the rash taken by the primary team with the permission of the patient.  Patient was seen at bedside  with the dermatology attending Dr. Chicas  Recommendations were communicated with the primary team.  Please page [phone] for further related questions.    Jodie Islas MD  Resident Physician, PGY3  [facility]  Pager: [phone]

## 2023-07-21 NOTE — PROGRESS NOTE ADULT - REASON FOR ADMISSION
unsteady gait

## 2023-07-21 NOTE — PROGRESS NOTE ADULT - PROBLEM SELECTOR PLAN 3
- as above, decrease keppra to 500 mg BID  and continue home dose lacosamide 200 mg BID  - pt states he has outpatient appointment with his neurologist next week
- as above, decrease keppra to 500 mg BID  and continue home dose lacosamide 200 mg BID  - pt states he has outpatient appointment with his neurologist next week
- as above, decrease keppra to 500 mg BID  and continue home dose lacosamide 200 mg BID  - pt states he has outpatient appointment with his neurologist on Wed 7/19  - given new tremor neuro to reeval today
- as above, decrease keppra to 500 mg BID  and continue home dose lacosamide 200 mg BID  - pt states he has outpatient appointment with his neurologist next week

## 2023-07-21 NOTE — PROGRESS NOTE ADULT - ASSESSMENT
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 R elbow erythema noted, with US R elbow with 0.5 x 0.5 x 0.8 cm olecranon fossa fluid collection, MRI not consistent with OM.    #R elbow SSTI  #Fever  #Environmental exposure  #Elevated ESR and CRP  - Nontoxic appearing. No subjective complaints. Nontender elbow, ROM intact. Subacute to chronic infection.  - C/f NTM infection, including Mycobacterium marinum, due to exposure to fish tank and pond.  - Note that Mycobacterium workup and treatment require a solid diagnosis and susceptibility before committing to a treatment (usually very long - months to years), and many times with combination of meds.  - MRI not consistent with OM    Recommendations:  - Dermatology for biopsy of the lesion (path) and mycobacterial culture. Please specify r/o NTM, including mycobacterium marinum. Also send fungal culture  - Although low suspicion, would suggest cephalexin 500 mg PO Q6H x 5-7d.  - F/u BCx and UCx.  - F/u Fungitell and galactomannan  - Can send urine histo  - CBC with diff  - If patient is discharged, can follow the mycobacterial culture and path in 4 weeks with me. ([phone])    Plan discussed with primary team attending Dr. Menard.  Thank you for this consult. Inpatient ID team will follow.    Galdino Thompson MD, PhD  Attending Physician  Division of Infectious Diseases  Department of Medicine    Please contact through MS Teams message.  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 R elbow erythema noted, with US R elbow with 0.5 x 0.5 x 0.8 cm olecranon fossa fluid collection, MRI not consistent with OM.    #R elbow SSTI  #Environmental exposure  #Elevated ESR and CRP  - Nontoxic appearing. No subjective complaints. Nontender elbow, ROM intact. Subacute to chronic infection.  - C/f NTM infection, including Mycobacterium marinum, due to exposure to fish tank and pond.  - Note that Mycobacterium workup and treatment require a solid diagnosis and susceptibility before committing to a treatment (usually very long - months to years), and many times with combination of meds.  - MRI not consistent with OM    Recommendations:  - Dermatology for biopsy of the lesion (path) and mycobacterial culture. Please specify r/o NTM, including mycobacterium marinum. Also send fungal culture  - Although low suspicion, would suggest cephalexin 500 mg PO Q6H x 5-7d.  - F/u BCx and UCx.  - F/u Fungitell and galactomannan  - Can send urine histo  - CBC with diff  - If patient is discharged, can follow the mycobacterial culture and path in 4 weeks with me. ([phone])    Plan discussed with primary team attending Dr. Menard.  Thank you for this consult. Inpatient ID team will follow.    Galdino Thompson MD, PhD  Attending Physician  Division of Infectious Diseases  Department of Medicine    Please contact through MS Teams message.  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 R elbow erythema noted, with US R elbow with 0.5 x 0.5 x 0.8 cm olecranon fossa fluid collection, MRI not consistent with OM.    #R elbow SSTI  #Environmental exposure  #Elevated ESR and CRP  - Nontoxic appearing. No subjective complaints. Nontender elbow, ROM intact. Subacute to chronic infection.  - C/f NTM infection, including Mycobacterium marinum, due to exposure to fish tank and pond.  - Note that Mycobacterium workup and treatment require a solid diagnosis and susceptibility before committing to a treatment (usually very long - months to years), and many times with combination of meds.  - MRI not consistent with OM.  - Doppler w/o DVT RUE    Recommendations:  - Dermatology for biopsy of the lesion (path) and mycobacterial culture. Please specify r/o NTM, including mycobacterium marinum. Also send fungal culture  - Although low suspicion, would suggest cephalexin 500 mg PO Q6H x 5-7d.  - F/u BCx and UCx.  - F/u Fungitell and galactomannan  - Can send urine histo  - CBC with diff  - If patient is discharged, can follow the mycobacterial culture and path in 4 weeks with me. ([phone])    Plan discussed with primary team attending Dr. Menard.  Thank you for this consult. Inpatient ID team will follow.    Galdino Thompson MD, PhD  Attending Physician  Division of Infectious Diseases  Department of Medicine    Please contact through MS Teams message.  Office: [phone] (after 5 PM or weekend)

## 2023-07-21 NOTE — PROGRESS NOTE ADULT - PROBLEM SELECTOR PLAN 1
- noted to have UE tremors today, Neurology notified c/w keppra down to 500mg BID   - Neuro rec noted:   Vitamin B12, folate, D25-hydroxy, RPR, (wnl) pending results for vitamin B1, B6, and copper  - continue home lacosamide 200mg BID  - PT consult: rec outpt PT vs home PT (patient to decide)   - fall precaution
- noted to have UE tremors today, Neurology notified c/w keppra down to 500mg BID   - Neuro rec noted:   Vitamin B12, folate, D25-hydroxy, RPR, (wnl) pending results for vitamin B1, B6, and copper (f/u outpt)  - continue home lacosamide 200mg BID  - PT consult: outpt PT  - fall precaution
- noted to have UE tremors today, Neurology notified c/w keppra down to 500mg BID   - Neuro rec noted:   Vitamin B12, folate, D25-hydroxy, RPR, (wnl) pending results for vitamin B1, B6, and copper  - continue home lacosamide 200mg BID  - PT consult: rec outpt PT vs home PT (patient to decide)   - fall precaution
- noted to have UE tremors today, Neurology notified c/w keppra down to 500mg BID pending rec today   - continue home lacosamide 200mg BID  - PT consult pending   - fall precaution

## 2023-07-21 NOTE — PROGRESS NOTE ADULT - SUBJECTIVE AND OBJECTIVE BOX
Follow Up:      Interval History/ROS:    Allergies  No Known Allergies        ANTIMICROBIALS:  cephalexin 500 every 12 hours      OTHER MEDS:  MEDICATIONS  (STANDING):  acetaminophen     Tablet .. 650 every 6 hours PRN  aluminum hydroxide/magnesium hydroxide/simethicone Suspension 30 every 4 hours PRN  budesonide 160 MICROgram(s)/formoterol 4.5 MICROgram(s) Inhaler 2 two times a day  heparin   Injectable 5000 every 8 hours  lacosamide 200 two times a day  levETIRAcetam 500 two times a day  melatonin 3 at bedtime PRN  montelukast 10 daily  ondansetron Injectable 4 every 8 hours PRN      Vital Signs Last 24 Hrs  T(C): 36.4 (21 Jul 2023 12:34), Max: 36.9 (20 Jul 2023 20:17)  T(F): 97.6 (21 Jul 2023 12:34), Max: 98.4 (20 Jul 2023 20:17)  HR: 69 (21 Jul 2023 12:34) (69 - 78)  BP: 100/63 (21 Jul 2023 12:34) (100/63 - 112/67)  BP(mean): --  RR: 18 (21 Jul 2023 12:34) (18 - 20)  SpO2: 100% (21 Jul 2023 12:34) (96% - 100%)    Parameters below as of 21 Jul 2023 12:34  Patient On (Oxygen Delivery Method): room air        PHYSICAL EXAM:  Constitutional: non-toxic, no distress  HEAD/EYES: anicteric, no conjunctival injection  ENT:  supple, no thrush  Cardiovascular:   normal S1, S2, no murmur, no edema  Respiratory:  clear BS bilaterally, no wheezes, no rales  GI:  soft, non-tender, normal bowel sounds  :  no weaver, no CVA tenderness  Musculoskeletal:  no synovitis, normal ROM  Neurologic: awake and alert, normal strength, no focal findings  Skin:  no rash, no erythema, no phlebitis  Heme/Onc: no lymphadenopathy   Psychiatric:  awake, alert, appropriate mood                                10.2   6.39  )-----------( 310      ( 21 Jul 2023 07:08 )             31.3       07-21    132<L>  |  98  |  14  ----------------------------<  98  3.6   |  23  |  0.84    Ca    8.7      21 Jul 2023 07:08  Phos  3.3     07-20  Mg     2.1     07-20        Urinalysis Basic - ( 21 Jul 2023 07:08 )    Color: x / Appearance: x / SG: x / pH: x  Gluc: 98 mg/dL / Ketone: x  / Bili: x / Urobili: x   Blood: x / Protein: x / Nitrite: x   Leuk Esterase: x / RBC: x / WBC x   Sq Epi: x / Non Sq Epi: x / Bacteria: x        MICROBIOLOGY:  v  .Blood Blood-Peripheral  07-18-23   No growth at 72 Hours  --  --      Clean Catch Clean Catch (Midstream)  07-17-23   No growth  --  --      Clean Catch Clean Catch (Midstream)  07-08-23   <10,000 CFU/mL Normal Urogenital Swapna  --  --      .Blood Blood-Peripheral  07-08-23   No growth at 5 days  --  --      .Blood Blood-Peripheral  07-08-23   No growth at 5 days  --  --                RADIOLOGY:   Follow Up:    Cellulitis    Interval History/ROS:  No acute overnight episode reported. Elbow MRI not consistent with OM. Patient was seen and examined at bedside. Denies fever, chills, nausea, vomiting, SOB, or cough.    Allergies  No Known Allergies        ANTIMICROBIALS:  cephalexin 500 every 12 hours      OTHER MEDS:  MEDICATIONS  (STANDING):  acetaminophen     Tablet .. 650 every 6 hours PRN  aluminum hydroxide/magnesium hydroxide/simethicone Suspension 30 every 4 hours PRN  budesonide 160 MICROgram(s)/formoterol 4.5 MICROgram(s) Inhaler 2 two times a day  heparin   Injectable 5000 every 8 hours  lacosamide 200 two times a day  levETIRAcetam 500 two times a day  melatonin 3 at bedtime PRN  montelukast 10 daily  ondansetron Injectable 4 every 8 hours PRN      Vital Signs Last 24 Hrs  T(C): 36.4 (21 Jul 2023 12:34), Max: 36.9 (20 Jul 2023 20:17)  T(F): 97.6 (21 Jul 2023 12:34), Max: 98.4 (20 Jul 2023 20:17)  HR: 69 (21 Jul 2023 12:34) (69 - 78)  BP: 100/63 (21 Jul 2023 12:34) (100/63 - 112/67)  BP(mean): --  RR: 18 (21 Jul 2023 12:34) (18 - 20)  SpO2: 100% (21 Jul 2023 12:34) (96% - 100%)    Parameters below as of 21 Jul 2023 12:34  Patient On (Oxygen Delivery Method): room air        PHYSICAL EXAM:  Constitutional: non-toxic, no distress  Cardiovascular: normal S1, S2, no murmur, no edema  Respiratory: No crackles bilaterally anterior and axillary lung fields. no wheezes, no rales  GI:  soft, non-tender, normal bowel sounds  : no weaver  Musculoskeletal: R elbow slightly erythematous with firm induration and superficial dry skin desquamation; nontender to the touch; the lesion was not warmer than other parts of the RUE; active ROM intact  Neurologic: awake and oriented x3  Skin:  erythematous R elbow as described above  Psychiatric:  awake, alert, appropriate mood                                10.2   6.39  )-----------( 310      ( 21 Jul 2023 07:08 )             31.3       07-21    132<L>  |  98  |  14  ----------------------------<  98  3.6   |  23  |  0.84    Ca    8.7      21 Jul 2023 07:08  Phos  3.3     07-20  Mg     2.1     07-20        Urinalysis Basic - ( 21 Jul 2023 07:08 )    Color: x / Appearance: x / SG: x / pH: x  Gluc: 98 mg/dL / Ketone: x  / Bili: x / Urobili: x   Blood: x / Protein: x / Nitrite: x   Leuk Esterase: x / RBC: x / WBC x   Sq Epi: x / Non Sq Epi: x / Bacteria: x        MICROBIOLOGY:  v  .Blood Blood-Peripheral  07-18-23   No growth at 72 Hours  --  --      Clean Catch Clean Catch (Midstream)  07-17-23   No growth  --  --      Clean Catch Clean Catch (Midstream)  07-08-23   <10,000 CFU/mL Normal Urogenital Swapna  --  --      .Blood Blood-Peripheral  07-08-23   No growth at 5 days  --  --      .Blood Blood-Peripheral  07-08-23   No growth at 5 days  --  --                RADIOLOGY:  Imaging below independently reviewed.  < from: MR Elbow No Cont, Right (07.20.23 @ 20:27) >    ACC: [id number] EXAM:  MR ELBOW RT   ORDERED BY: JAMAICA LEIVA     PROCEDURE DATE:  07/20/2023          INTERPRETATION:  MR ELBOW RIGHT dated 7/20/2023 8:27 PM    INDICATION: Pain and swelling    COMPARISON: Upper extremity sonogram dated 7/18/2023    TECHNIQUE: Multi-sequential, multiplanar MRI imaging of the right elbow   was performed per standard protocol. Limited without T1 weighted imaging.    FINDINGS:    BONE MARROW: There is no fracture. No reactive bone marrow edema or   cortical erosion seen. Relative preservation of the hyaline cartilage   within the elbow joint space.  SYNOVIUM/ JOINT FLUID: No joint effusion.  TENDONS: Intact tendons.  MUSCLES: No muscle edema  NEUROVASCULAR STRUCTURES: Edema surrounding the brachial vein with   suggestion of a filling defect.  SUBCUTANEOUS SOFT TISSUES: There is soft tissue swelling and skin   irregularity at the anterior elbow. No drainable fluid collection is seen.    IMPRESSION:    Skin irregularity and soft tissue swelling concerning for cellulitis. No   drainable fluid collection.  Soft tissue edema about the brachial vein with suggestion of a filling   defect. Dedicated Doppler can be performed to exclude thrombophlebitis.    Findings were discussed with MIKE Villarreal on 7/20/2023 8:51 PM  with read   back.    --- End of Report ---            GUERO MOSLEY MD; Attending Radiologist  This document has been electronically signed. Jul 20 2023  8:52PM    < end of copied text >

## 2023-07-21 NOTE — PROGRESS NOTE ADULT - NSPROGADDITIONALINFOA_GEN_ALL_CORE
time spent reviewing prior charts, meds, discussing plan with patient= 80  min     d/w Medicine ACP Megan
time spent reviewing prior charts, meds, discussing plan with patient= 60 min     d/w  Medicine ACP Megan
d/w  Medicine ACP Megan     time spent reviewing prior charts, meds, discussing plan with patient= 75 min
d/w Medicine ACP Kathie     time spent reviewing prior charts, meds, discussing plan with patient= 80 min

## 2023-07-21 NOTE — PROGRESS NOTE ADULT - PROVIDER SPECIALTY LIST ADULT
Hospitalist
Infectious Disease
Neurology
Infectious Disease
Neurology
Hospitalist

## 2023-07-21 NOTE — PROGRESS NOTE ADULT - PROBLEM SELECTOR PLAN 2
poss 2/2 cellulitis Subacute? concern for Mycobacterium marinum? Derm consulted for biopsy f/u path, fungal cx and NTM cx  - Derm consult appreciated: plan for biopsy today   - d/w ID d/c on keflex 500 mg BID x 5 days   - blood cx and urine cx neg  - Patient to f/u with Dr. Donna doyle regarding results outpt

## 2023-07-22 ENCOUNTER — TRANSCRIPTION ENCOUNTER (OUTPATIENT)
Age: 78
End: 2023-07-22

## 2023-07-22 VITALS
DIASTOLIC BLOOD PRESSURE: 66 MMHG | TEMPERATURE: 98 F | OXYGEN SATURATION: 96 % | HEART RATE: 63 BPM | SYSTOLIC BLOOD PRESSURE: 106 MMHG | RESPIRATION RATE: 18 BRPM

## 2023-07-22 LAB
ANION GAP SERPL CALC-SCNC: 12 MMOL/L — SIGNIFICANT CHANGE UP (ref 5–17)
BASOPHILS # BLD AUTO: 0.09 K/UL — SIGNIFICANT CHANGE UP (ref 0–0.2)
BASOPHILS NFR BLD AUTO: 1.3 % — SIGNIFICANT CHANGE UP (ref 0–2)
BUN SERPL-MCNC: 13 MG/DL — SIGNIFICANT CHANGE UP (ref 7–23)
CALCIUM SERPL-MCNC: 8.9 MG/DL — SIGNIFICANT CHANGE UP (ref 8.4–10.5)
CHLORIDE SERPL-SCNC: 96 MMOL/L — SIGNIFICANT CHANGE UP (ref 96–108)
CO2 SERPL-SCNC: 23 MMOL/L — SIGNIFICANT CHANGE UP (ref 22–31)
CREAT SERPL-MCNC: 0.72 MG/DL — SIGNIFICANT CHANGE UP (ref 0.5–1.3)
EGFR: 94 ML/MIN/1.73M2 — SIGNIFICANT CHANGE UP
EOSINOPHIL # BLD AUTO: 0.56 K/UL — HIGH (ref 0–0.5)
EOSINOPHIL NFR BLD AUTO: 8.2 % — HIGH (ref 0–6)
GLUCOSE SERPL-MCNC: 90 MG/DL — SIGNIFICANT CHANGE UP (ref 70–99)
HCT VFR BLD CALC: 33.6 % — LOW (ref 39–50)
HGB BLD-MCNC: 10.5 G/DL — LOW (ref 13–17)
IMM GRANULOCYTES NFR BLD AUTO: 0.7 % — SIGNIFICANT CHANGE UP (ref 0–0.9)
LYMPHOCYTES # BLD AUTO: 1.16 K/UL — SIGNIFICANT CHANGE UP (ref 1–3.3)
LYMPHOCYTES # BLD AUTO: 17 % — SIGNIFICANT CHANGE UP (ref 13–44)
MCHC RBC-ENTMCNC: 24.6 PG — LOW (ref 27–34)
MCHC RBC-ENTMCNC: 31.3 GM/DL — LOW (ref 32–36)
MCV RBC AUTO: 78.9 FL — LOW (ref 80–100)
MONOCYTES # BLD AUTO: 0.57 K/UL — SIGNIFICANT CHANGE UP (ref 0–0.9)
MONOCYTES NFR BLD AUTO: 8.3 % — SIGNIFICANT CHANGE UP (ref 2–14)
NEUTROPHILS # BLD AUTO: 4.41 K/UL — SIGNIFICANT CHANGE UP (ref 1.8–7.4)
NEUTROPHILS NFR BLD AUTO: 64.5 % — SIGNIFICANT CHANGE UP (ref 43–77)
NIGHT BLUE STAIN TISS: SIGNIFICANT CHANGE UP
NRBC # BLD: 0 /100 WBCS — SIGNIFICANT CHANGE UP (ref 0–0)
PLATELET # BLD AUTO: 352 K/UL — SIGNIFICANT CHANGE UP (ref 150–400)
POTASSIUM SERPL-MCNC: 3.7 MMOL/L — SIGNIFICANT CHANGE UP (ref 3.5–5.3)
POTASSIUM SERPL-SCNC: 3.7 MMOL/L — SIGNIFICANT CHANGE UP (ref 3.5–5.3)
RBC # BLD: 4.26 M/UL — SIGNIFICANT CHANGE UP (ref 4.2–5.8)
RBC # FLD: 15.9 % — HIGH (ref 10.3–14.5)
SODIUM SERPL-SCNC: 131 MMOL/L — LOW (ref 135–145)
SPECIMEN SOURCE: SIGNIFICANT CHANGE UP
WBC # BLD: 6.84 K/UL — SIGNIFICANT CHANGE UP (ref 3.8–10.5)
WBC # FLD AUTO: 6.84 K/UL — SIGNIFICANT CHANGE UP (ref 3.8–10.5)

## 2023-07-22 PROCEDURE — 87040 BLOOD CULTURE FOR BACTERIA: CPT

## 2023-07-22 PROCEDURE — 97116 GAIT TRAINING THERAPY: CPT

## 2023-07-22 PROCEDURE — 87116 MYCOBACTERIA CULTURE: CPT

## 2023-07-22 PROCEDURE — 81001 URINALYSIS AUTO W/SCOPE: CPT

## 2023-07-22 PROCEDURE — 83935 ASSAY OF URINE OSMOLALITY: CPT

## 2023-07-22 PROCEDURE — 84100 ASSAY OF PHOSPHORUS: CPT

## 2023-07-22 PROCEDURE — 86780 TREPONEMA PALLIDUM: CPT

## 2023-07-22 PROCEDURE — 71045 X-RAY EXAM CHEST 1 VIEW: CPT

## 2023-07-22 PROCEDURE — 87449 NOS EACH ORGANISM AG IA: CPT

## 2023-07-22 PROCEDURE — 71250 CT THORAX DX C-: CPT

## 2023-07-22 PROCEDURE — 88305 TISSUE EXAM BY PATHOLOGIST: CPT

## 2023-07-22 PROCEDURE — 87015 SPECIMEN INFECT AGNT CONCNTJ: CPT

## 2023-07-22 PROCEDURE — 70450 CT HEAD/BRAIN W/O DYE: CPT | Mod: MA

## 2023-07-22 PROCEDURE — 82607 VITAMIN B-12: CPT

## 2023-07-22 PROCEDURE — 83930 ASSAY OF BLOOD OSMOLALITY: CPT

## 2023-07-22 PROCEDURE — 87102 FUNGUS ISOLATION CULTURE: CPT

## 2023-07-22 PROCEDURE — 99285 EMERGENCY DEPT VISIT HI MDM: CPT

## 2023-07-22 PROCEDURE — 80048 BASIC METABOLIC PNL TOTAL CA: CPT

## 2023-07-22 PROCEDURE — 84207 ASSAY OF VITAMIN B-6: CPT

## 2023-07-22 PROCEDURE — 82306 VITAMIN D 25 HYDROXY: CPT

## 2023-07-22 PROCEDURE — 82525 ASSAY OF COPPER: CPT

## 2023-07-22 PROCEDURE — 93971 EXTREMITY STUDY: CPT

## 2023-07-22 PROCEDURE — 94640 AIRWAY INHALATION TREATMENT: CPT

## 2023-07-22 PROCEDURE — 84425 ASSAY OF VITAMIN B-1: CPT

## 2023-07-22 PROCEDURE — 76882 US LMTD JT/FCL EVL NVASC XTR: CPT

## 2023-07-22 PROCEDURE — 97162 PT EVAL MOD COMPLEX 30 MIN: CPT

## 2023-07-22 PROCEDURE — 80053 COMPREHEN METABOLIC PANEL: CPT

## 2023-07-22 PROCEDURE — 82962 GLUCOSE BLOOD TEST: CPT

## 2023-07-22 PROCEDURE — 84145 PROCALCITONIN (PCT): CPT

## 2023-07-22 PROCEDURE — 85027 COMPLETE CBC AUTOMATED: CPT

## 2023-07-22 PROCEDURE — 86140 C-REACTIVE PROTEIN: CPT

## 2023-07-22 PROCEDURE — 88312 SPECIAL STAINS GROUP 1: CPT

## 2023-07-22 PROCEDURE — 85652 RBC SED RATE AUTOMATED: CPT

## 2023-07-22 PROCEDURE — 85025 COMPLETE CBC W/AUTO DIFF WBC: CPT

## 2023-07-22 PROCEDURE — 84443 ASSAY THYROID STIM HORMONE: CPT

## 2023-07-22 PROCEDURE — 99239 HOSP IP/OBS DSCHRG MGMT >30: CPT

## 2023-07-22 PROCEDURE — 87305 ASPERGILLUS AG IA: CPT

## 2023-07-22 PROCEDURE — 73221 MRI JOINT UPR EXTREM W/O DYE: CPT

## 2023-07-22 PROCEDURE — 84300 ASSAY OF URINE SODIUM: CPT

## 2023-07-22 PROCEDURE — 88313 SPECIAL STAINS GROUP 2: CPT

## 2023-07-22 PROCEDURE — 87086 URINE CULTURE/COLONY COUNT: CPT

## 2023-07-22 PROCEDURE — 83735 ASSAY OF MAGNESIUM: CPT

## 2023-07-22 PROCEDURE — 82746 ASSAY OF FOLIC ACID SERUM: CPT

## 2023-07-22 PROCEDURE — 87206 SMEAR FLUORESCENT/ACID STAI: CPT

## 2023-07-22 PROCEDURE — 87389 HIV-1 AG W/HIV-1&-2 AB AG IA: CPT

## 2023-07-22 PROCEDURE — 36415 COLL VENOUS BLD VENIPUNCTURE: CPT

## 2023-07-22 RX ORDER — CEPHALEXIN 500 MG
1 CAPSULE ORAL
Qty: 16 | Refills: 0
Start: 2023-07-22 | End: 2023-07-25

## 2023-07-22 RX ORDER — LEVETIRACETAM 250 MG/1
1 TABLET, FILM COATED ORAL
Qty: 60 | Refills: 0
Start: 2023-07-22 | End: 2023-08-20

## 2023-07-22 RX ORDER — CEPHALEXIN 500 MG
500 CAPSULE ORAL EVERY 6 HOURS
Refills: 0 | Status: DISCONTINUED | OUTPATIENT
Start: 2023-07-22 | End: 2023-07-22

## 2023-07-22 RX ORDER — AMLODIPINE BESYLATE 2.5 MG/1
1 TABLET ORAL
Refills: 0 | DISCHARGE

## 2023-07-22 RX ADMIN — LEVETIRACETAM 500 MILLIGRAM(S): 250 TABLET, FILM COATED ORAL at 05:24

## 2023-07-22 RX ADMIN — Medication 2 SPRAY(S): at 05:25

## 2023-07-22 RX ADMIN — Medication 500 MILLIGRAM(S): at 05:24

## 2023-07-22 RX ADMIN — Medication 650 MILLIGRAM(S): at 04:34

## 2023-07-22 RX ADMIN — LACOSAMIDE 200 MILLIGRAM(S): 50 TABLET ORAL at 05:24

## 2023-07-22 RX ADMIN — Medication 500 MILLIGRAM(S): at 12:47

## 2023-07-22 RX ADMIN — BUDESONIDE AND FORMOTEROL FUMARATE DIHYDRATE 2 PUFF(S): 160; 4.5 AEROSOL RESPIRATORY (INHALATION) at 05:25

## 2023-07-22 RX ADMIN — HEPARIN SODIUM 5000 UNIT(S): 5000 INJECTION INTRAVENOUS; SUBCUTANEOUS at 05:24

## 2023-07-22 RX ADMIN — MONTELUKAST 10 MILLIGRAM(S): 4 TABLET, CHEWABLE ORAL at 12:47

## 2023-07-22 RX ADMIN — Medication 650 MILLIGRAM(S): at 05:34

## 2023-07-22 NOTE — DISCHARGE NOTE PROVIDER - CARE PROVIDER_API CALL
Jovanny Sweet  Neurology  [address] [facility]  [place] [address]  Phone: [phone]  Fax: [phone]  Follow Up Time: 1 week    Galdino Thompson  Infectious Disease  [address] [place] [address]  Phone: [phone]  Fax: [phone]  Scheduled Appointment: 07/28/2023   Jovanny Sweet  Neurology  [address] [facility]  [place] [address]  Phone: [phone]  Fax: [phone]  Follow Up Time: 1 week    Galdino Thompson  Infectious Disease  [address] [place] [address]  Phone: [phone]  Fax: [phone]  Scheduled Appointment: 07/28/2023    FABY CAVAZOS  Phone: [phone]  Fax: (   )    -  Established Patient  Follow Up Time: 1 week

## 2023-07-22 NOTE — DISCHARGE NOTE PROVIDER - NSDCMRMEDTOKEN_GEN_ALL_CORE_FT
albuterol 90 mcg/inh inhalation aerosol: 2 puff(s) inhaled every 6 hours, As Needed  amLODIPine 10 mg oral tablet: 1 tab(s) orally once a day  Bactrim  mg-160 mg oral tablet: 1 tab(s) orally 2 times a day  Breo Ellipta 200 mcg-25 mcg/inh inhalation powder: 1 puff(s) inhaled once a day  Flonase 50 mcg/inh nasal spray: 2 spray(s) nasal 2 times a day  lacosamide 200 mg oral tablet: 1 tab(s) orally 2 times a day MDD:400mg Daily  levETIRAcetam 750 mg oral tablet, extended release: 1 tab(s) orally twice a day after breakfast and dinner  montelukast 10 mg oral tablet: 1 tab(s) orally once a day  Outpatient Physical Therapy: ICD-10 code: R53.81   albuterol 90 mcg/inh inhalation aerosol: 2 puff(s) inhaled every 6 hours, As Needed  Breo Ellipta 200 mcg-25 mcg/inh inhalation powder: 1 puff(s) inhaled once a day  Flonase 50 mcg/inh nasal spray: 2 spray(s) nasal 2 times a day  lacosamide 200 mg oral tablet: 1 tab(s) orally 2 times a day MDD:400mg Daily  levETIRAcetam 750 mg oral tablet, extended release: 1 tab(s) orally twice a day after breakfast and dinner  montelukast 10 mg oral tablet: 1 tab(s) orally once a day  Outpatient Physical Therapy: ICD-10 code: R53.81   albuterol 90 mcg/inh inhalation aerosol: 2 puff(s) inhaled every 6 hours, As Needed  Breo Ellipta 200 mcg-25 mcg/inh inhalation powder: 1 puff(s) inhaled once a day  cephalexin 500 mg oral capsule: 1 cap(s) orally every 6 hours  Flonase 50 mcg/inh nasal spray: 2 spray(s) nasal 2 times a day  Keppra 500 mg oral tablet: 1 tab(s) orally 2 times a day  lacosamide 200 mg oral tablet: 1 tab(s) orally 2 times a day MDD:400mg Daily  montelukast 10 mg oral tablet: 1 tab(s) orally once a day  Outpatient Physical Therapy: ICD-10 code: R53.81

## 2023-07-22 NOTE — DISCHARGE NOTE PROVIDER - NSDCFUADDAPPT_GEN_ALL_CORE_FT
APPTS ARE READY TO BE MADE: [x ] YES    Best Family or Patient Contact (if needed):    Additional Information about above appointments (if needed):    1:   2:   3:     Other comments or requests:     [] Patient can follow up with  Dr. Sweet at [address] [place] [address] 1-2 weeks after discharge by calling [phone] to schedule this appointment    [ ] [facility] Dermatology at [place] [address] Juanito Ayanna [address] [address]  [phone]     APPTS ARE READY TO BE MADE: [x ] YES    Best Family or Patient Contact (if needed):    Additional Information about above appointments (if needed):    1: Infectious Disease Galdino Ferrara [phone]  2: Neurology Jovanny Lu [phone]  3:     Other comments or requests:     [] Patient can follow up with  Dr. Sweet at [address] [place] [address] 1-2 weeks after discharge by calling [phone] to schedule this appointment    [ ] [facility] Dermatology at [place]  [address] [id number] [phone]     APPTS ARE READY TO BE MADE: [x ] YES    Best Family or Patient Contact (if needed):    Additional Information about above appointments (if needed):    1: Infectious Disease Galdino Ferrara [phone]  2: Neurology Jovanny Lu [phone]  3:     Other comments or requests:     [] Patient can follow up with  Dr. Sweet at [address] [place] [address] 1-2 weeks after discharge by calling [phone] to schedule this appointment    [ ] [facility] Dermatology at [address], [place], [place] [id number] [phone]    Patient was previously scheduled with Dr. Sweet on 8/29/23 at 2:00pm at [address] [place] [address]   Patient was previously scheduled with Dr. Galdino Thompson on 7/28/23 at 9:30am at [address] [place] [address].	  Patient was previously scheduled with Dr. Carly Arteaga on 7/26/23 at 10:00am at [address] [place] [address].  Patient/Caregiver was provided with follow up request details and prefers to call the providers office on their own to schedule.

## 2023-07-22 NOTE — CHART NOTE - NSCHARTNOTEFT_GEN_A_CORE
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 He presents with sudden generalized weakness, now improving. His exam is negative for any focal deficits. He was seen by neurology, symptoms thought to be d/t keppra and recommended decrease his keppra dose.     Patient seen and examined at bedside. States that he feels ok denies any complaints.     # Generalized weakness.   - noted to have UE tremors today, Neurology notified c/w keppra down to 500mg BID   - Neuro rec noted:   Vitamin B12, folate, D25-hydroxy, RPR, (wnl) pending results for vitamin B1, B6, and copper (f/u outpt)  - continue home lacosamide 200mg BID  - PT consult: outpt PT  - fall precaution.    # Fever.   ·  Plan: poss 2/2 cellulitis Subacute? concern for Mycobacterium marinum? Derm consulted for biopsy f/u path, fungal cx and NTM cx  - Derm consult appreciated: plan for biopsy     - d/w ID d/c on keflex 500 mg Q6h x 5 days   - blood cx and urine cx neg  - Patient to f/u with Dr. Thompson outpt regarding results outpt in 3-4 weeks    #History of seizure.   ·  Plan: - as above, decrease keppra to 500 mg BID  and continue home dose lacosamide 200 mg BID  - pt states he has outpatient appointment with his neurologist next week.    # Hyponatremia.   - improving 132  - urine lytes reviewed: SIADH limit fluids 1L daily   - monitor BMP.    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 He presents with sudden generalized weakness, now improving. His exam is negative for any focal deficits. He was seen by neurology, symptoms thought to be d/t keppra and recommended decrease his keppra dose.     Patient seen and examined at bedside. States that he feels ok denies any complaints.       # metabolic encephalopathy- likley 2/2 keppra toxicity   - pt. with generalized weakness due to Keppra toxicity, now improved after dose reduction   - noted to have UE tremors today, Neurology notified c/w keppra down to 500mg BID   - Neuro rec noted:   Vitamin B12, folate, D25-hydroxy, RPR, (wnl) pending results for vitamin B1, B6, and copper (f/u outpt)  - continue home lacosamide 200mg BID  - PT consult: outpt PT  - fall precaution.    # Fever.   ·  Plan: poss 2/2 cellulitis Subacute? concern for Mycobacterium marinum? Derm consulted for biopsy f/u path, fungal cx and NTM cx  - Derm consult appreciated: plan for biopsy     - d/w ID d/c on keflex 500 mg Q6h x 5 days   - blood cx and urine cx neg  - Patient to f/u with Dr. Thompson outpt regarding results outpt in 3-4 weeks    #History of seizure.   ·  Plan: - as above, decrease keppra to 500 mg BID  and continue home dose lacosamide 200 mg BID  - pt states he has outpatient appointment with his neurologist next week.    # Hyponatremia.   - improving 132  - urine lytes reviewed: SIADH limit fluids 1L daily   - monitor BMP.    Patient is medically stable for d/c  home, f/u with ID and derm outpt, f/u with PCP for mild anemia outpt  time spent 45 min   d/w Medicine ADILIA López

## 2023-07-22 NOTE — DISCHARGE NOTE NURSING/CASE MANAGEMENT/SOCIAL WORK - NSDCFUADDAPPT_GEN_ALL_CORE_FT
APPTS ARE READY TO BE MADE: [x ] YES    Best Family or Patient Contact (if needed):    Additional Information about above appointments (if needed):    1:   2:   3:     Other comments or requests:     [] Patient can follow up with  Dr. Sweet at [address] [place] [address] 1-2 weeks after discharge by calling [phone] to schedule this appointment    [ ] [facility] Dermatology at [place] [address] Juan[address] A[address] [address]  [phone]

## 2023-07-22 NOTE — DISCHARGE NOTE PROVIDER - NSRESEARCHGRANT_HIDDEN_GEN_A_CORE
Post-Anesthesia Evaluation and Assessment    Patient: Pedro Pablo Robb MRN: [id number]  SSN: [id number]    YOB: 1952  Age: 71 y.o. Sex: female       Cardiovascular Function/Vital Signs  Visit Vitals    /82    Pulse 69    Temp 36.4 °C (97.5 °F)    Resp 20    Ht 5' 5\" (1.651 m)    Wt 93 kg (205 lb)    SpO2 97%    Breastfeeding No    BMI 34.11 kg/m2       Patient is status post total IV anesthesia anesthesia for Procedure(s):  COLONOSCOPY. Nausea/Vomiting: None    Postoperative hydration reviewed and adequate. Pain:  Pain Scale 1: Numeric (0 - 10) (02/28/17 1040)  Pain Intensity 1: 0 (02/28/17 1040)   Managed    Neurological Status: At baseline    Mental Status and Level of Consciousness: Arousable    Pulmonary Status:   O2 Device: Room air (02/28/17 1040)   Adequate oxygenation and airway patent    Complications related to anesthesia: None    Post-anesthesia assessment completed.  No concerns    Signed By: Flavia Welch DO     February 28, 2017 Yes

## 2023-07-22 NOTE — DISCHARGE NOTE PROVIDER - CARE PROVIDERS DIRECT ADDRESSES
,[email or web address],DirectAddress_Unknown [email or web address],DirectAddress_Unknown,DirectAddress_Unknown

## 2023-07-22 NOTE — DISCHARGE NOTE NURSING/CASE MANAGEMENT/SOCIAL WORK - NSDCPEFALRISK_GEN_ALL_CORE
For information on Fall & Injury Prevention, visit: [email or web address] OR  [email or web address] OR  https://www.cdc.gov/steadi/patient.html

## 2023-07-22 NOTE — DISCHARGE NOTE PROVIDER - PROVIDER TOKENS
PROVIDER:[TOKEN:[[id number]],FOLLOWUP:[1 week]],PROVIDER:[TOKEN:[[id number]],SCHEDULEDAPPT:[07/28/2023]] PROVIDER:[TOKEN:[[id number]],FOLLOWUP:[1 week]],PROVIDER:[TOKEN:[[id number]],SCHEDULEDAPPT:[07/28/2023]],FREE:[LAST:[MACY],FIRST:[FABY],PHONE:[phone]],FAX:[(   )    -],FOLLOWUP:[1 week],ESTABLISHEDPATIENT:[T]]

## 2023-07-22 NOTE — DISCHARGE NOTE PROVIDER - NSDCCPCAREPLAN_GEN_ALL_CORE_FT
PRINCIPAL DISCHARGE DIAGNOSIS  Diagnosis: Impaired ambulation  Assessment and Plan of Treatment: you likly had impaired ambulation from high keppra dose  your keppra dose was reduced  Follow up with Neurology      SECONDARY DISCHARGE DIAGNOSES  Diagnosis: Hyponatremia  Assessment and Plan of Treatment: Your sodium levels have improved with free water restriction    Diagnosis: Asthma with COPD  Assessment and Plan of Treatment: continue current inhalers    Diagnosis: Infection of right elbow  Assessment and Plan of Treatment: you had biopsy completed by Dermatology  complete antibiotics as prescribed and follow up with ID and Dermatology     PRINCIPAL DISCHARGE DIAGNOSIS  Diagnosis: Impaired ambulation  Assessment and Plan of Treatment: You likely had impaired ambulation from a high keppra dose  Your keppra dose was reduced to 500mg BID   Follow up with Neurology Dr. Sweet within 1-2 weeks from discharge      SECONDARY DISCHARGE DIAGNOSES  Diagnosis: Asthma with COPD  Assessment and Plan of Treatment: continue current inhalers    Diagnosis: Hyponatremia  Assessment and Plan of Treatment: Your sodium levels have improved with free water restriction    Diagnosis: Infection of right elbow  Assessment and Plan of Treatment: You had biopsy completed by Dermatology, follow up with within 4 weeks for pathology report  Continue antibiotic Keflex as prescribed and follow up with infectious disease Galdino Ng within 3 weeks    Diagnosis: HTN (hypertension)  Assessment and Plan of Treatment: Your BP was stable off amlodipine, please follow up with your PCP within 1-2 weeks from discharge.

## 2023-07-22 NOTE — DISCHARGE NOTE PROVIDER - NSDCFUSCHEDAPPT_GEN_ALL_CORE_FT
Carly Arteaga  [facility]  NEUROLOGY [address] [facility]  Scheduled Appointment: 07/26/2023    Galdino Thompson  [facility]  INFDISEASE [address]  Scheduled Appointment: 07/28/2023    Jovanny Sweet  [facility]  NEUROLOGY [address]  Scheduled Appointment: 08/29/2023    Galdino Thakkar  [facility]  NEUROLOGY [address] [place] [address]  Scheduled Appointment: 09/28/2023     Carly Arteaga  [facility]  NEUROLOGY [address]  Scheduled Appointment: 07/26/2023    Galdino Thompson  [facility]  [address]ASE [address]  Scheduled Appointment: 08/11/2023    Jovanny Sweet  [facility]  NEUROLOGY [address]  Scheduled Appointment: 08/29/2023    Galdino Thakkar  [facility]  NEUROLOGY [address] [place] [address]  Scheduled Appointment: 09/28/2023

## 2023-07-22 NOTE — DISCHARGE NOTE PROVIDER - HOSPITAL COURSE
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 He presents with sudden generalized weakness. Yesterday he could not even get out of bed. Recent admission to EMU for AMS. EEG during that admission only showed focal dysfunction in the left temporal region, mild nonspecific diffuse cerebral dysfunction, but no epileptiform discharge or seizures were recorded. Patient's keppra was increased to 750 mg BID, Vimpat was continued at 200 mg BID. Patient says since his admission he has been sleeping more. Denies unilateral weakness, numbness, tingling vision changes, falls. Exam notable for slow cautious narrow shuffling gait & Romberg (+). Neurology consulted. Ct head unremarkable. labs pertinent for sodium 129.Increased lethargy attributed to increased keppra dose vs toxic/metabolic etiology ( hyponatremia). fluctuating UE tremors and gait difficulty with some decrease of proprioception of b/L feet possibly related to toxic/metabolic etiology  vs neurodegenerative etiology ( however no cogwheel rigidity on exam). Advised to  continue keppra at 500mg bid (extended release) and vimpat 200mg bid. Labs reviewed. [] labs: TSH 0.82, UA/UC (-), Na (130), Vitamin B1, B6, B12 (769), folate (9.6), D25-hydroxy (24), RPR ( negative).      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 NA  improving 132. Course complicated with fever to 102.3, w/ ESR 71 and  w/o leukocytosis. R elbow erythema noted, with US R elbow with 0.5 x 0.5 x 0.8 cm olecranon fossa fluid collection, MRI not consistent with OM. ID consulted for R elbow SSTI. Subacute to chronic infection.- C/f NTM infection, including Mycobacterium marinum, due to exposure to fish tank and pond. Noted that Mycobacterium workup and treatment require a solid diagnosis and susceptibility before committing to a treatment (usually very long - months to years), and many times with combination of meds.   Doppler w/o DVT RUE . Recommended  Dermatology for biopsy of the lesion (path) and mycobacterial culture. Alough low suspicion suggested cephalexin 500 mg PO Q6H x 5-7d.   Derm consult appreciated: s/p biopsy.  blood cx and urine cx neg     Mental status and tremors improved. NO further fevers. Medically cleared for discharge HOme with outpatient PT with ID, Neurology, dermatology follow up.                           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 He presents with sudden generalized weakness, now improving. His exam is negative for any focal deficits. He was seen by neurology, symptoms thought to be d/t keppra and recommended decrease his keppra dose.     Patient seen and examined at bedside. States that he feels ok denies any complaints.     # Generalized weakness.   - noted to have UE tremors today, Neurology notified c/w keppra down to 500mg BID   - Neuro rec noted:   Vitamin B12, folate, D25-hydroxy, RPR, (wnl) pending results for vitamin B1, B6, and copper (f/u outpt)  - continue home lacosamide 200mg BID  - PT consult: outpt PT  - fall precaution.    # Fever.   ·  Plan: poss 2/2 cellulitis Subacute? concern for Mycobacterium marinum? Derm consulted for biopsy f/u path, fungal cx and NTM cx  - Derm consult appreciated: plan for biopsy     - d/w ID d/c on keflex 500 mg Q6h x 5 days   - blood cx and urine cx neg  - Patient to f/u with Dr. Thompson outpt regarding results outpt in 3-4 weeks    #History of seizure.   ·  Plan: - as above, decrease keppra to 500 mg BID  and continue home dose lacosamide 200 mg BID  - pt states he has outpatient appointment with his neurologist next week.    # Hyponatremia.   - improving 132  - urine lytes reviewed: SIADH limit fluids 1L daily   - monitor BMP.    Patient is medically stable for d/c  home, f/u with ID and derm outpt, f/u with PCP for mild anemia

## 2023-07-22 NOTE — DISCHARGE NOTE NURSING/CASE MANAGEMENT/SOCIAL WORK - PATIENT PORTAL LINK FT
You can access the FollowMyHealth Patient Portal offered by [facility] by registering at the following website: [email or web address]. By joining [employer]’s FollowMyHealth portal, you will also be able to view your health information using other applications (apps) compatible with our system. 14-Jan-2020

## 2023-07-23 LAB
COPPER SERPL-MCNC: 131 UG/DL — SIGNIFICANT CHANGE UP (ref 69–132)
CULTURE RESULTS: SIGNIFICANT CHANGE UP
CULTURE RESULTS: SIGNIFICANT CHANGE UP
FUNGITELL: <31 PG/ML — SIGNIFICANT CHANGE UP
PYRIDOXAL PHOS SERPL-MCNC: 4 UG/L — SIGNIFICANT CHANGE UP (ref 3.4–65.2)
SPECIMEN SOURCE: SIGNIFICANT CHANGE UP
SPECIMEN SOURCE: SIGNIFICANT CHANGE UP

## 2023-07-24 ENCOUNTER — NON-APPOINTMENT (OUTPATIENT)
Age: 78
End: 2023-07-24

## 2023-07-26 ENCOUNTER — APPOINTMENT (OUTPATIENT)
Dept: NEUROLOGY | Facility: CLINIC | Age: 78
End: 2023-07-26
Payer: MEDICARE

## 2023-07-26 ENCOUNTER — TRANSCRIPTION ENCOUNTER (OUTPATIENT)
Age: 78
End: 2023-07-26

## 2023-07-26 VITALS
SYSTOLIC BLOOD PRESSURE: 132 MMHG | HEART RATE: 67 BPM | WEIGHT: 143 LBS | DIASTOLIC BLOOD PRESSURE: 68 MMHG | BODY MASS INDEX: 24.41 KG/M2 | HEIGHT: 64 IN

## 2023-07-26 PROCEDURE — 99213 OFFICE O/P EST LOW 20 MIN: CPT

## 2023-07-28 NOTE — ASSESSMENT
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 Had abundant left temporal seizures during his emu stay, a multidisciplinary meeting was held and decision was made to monitor the lesion and biopsy as appropriate. Fortunately seizures are well controlled and remains seizure free since discharge. \par Keppra dose lowered to 500mg BID during recent hospitalization\par \par Plan\par -continue  Keppra 5000 mg BID/Vimpat 200 mg BID \par - Driving restrictions 1 year as per [facility] law, to be re-discussed at 6 months.\par - Continue to follow-up with Dr. Davidson and lesion surveillance \par - Seizure triggers/safty discussed\par - Scheduled to see Dr Sweet next month\par \par \par I have spent 30 minutes or longer reviewing patient data or discussing with the patient  the cause of seizures or seizure-like events and comorbid conditions, assessing the risk of recurrence, educating the patient or family to recognize seizures, discussing possible treatment options for seizures and comorbid conditions and documenting encounter and plan. More than 50% of time spent counseling and educating patient about epilepsy specific safety issues including AED side effects and interactions, alcohol consumption, sleep deprivation, risks and driving privileges associated with the [place] Guidelines, death related to seizures/SUDEP, seizure 1st aid and risks. Patient is educated on seizure precautions, including no driving, no operating machinery, no swimming or bathing, no climbing heights, or engage in any risky activities during which a seizure could cause further injury to pt or others. Greater than 50% of the encounter time was spent on counseling and coordination of care for reviewing records in [employer], discussion with patient regarding plan.

## 2023-07-28 NOTE — DATA REVIEWED
[de-identified] : MRI revealed a hyperintense flair signal in the left amygdala, hippocampus.  [de-identified] : Eight focal electrographic seizures, while mostly subclinical at times 2.5 Hz evolving rhythmic delta is seen maximally over the left temporal (F7), and frontocentral chains that remains confined to these chains. Ictal electrographic pattern is typically preceded by the tachycardia 10-15 seconds prior. Clinically there is stereotypical semiology of arousal (if asleep), followed by +/- oral automatisms, grimacing / frowning, profanities, and hyperventilation. Awareness is preserved patient recognizes the seizure and triggers the event button. He can effectively communicate with bedside staff. He reports being amnestic to these seizures. Duration 60-90 seconds. No slowing observed at offset. \par  [de-identified] : AIE panel negative \par

## 2023-07-28 NOTE — HISTORY OF PRESENT ILLNESS
[FreeTextEntry1] : ***UPDATE:7/26/2023***\par Mr Aj Albarran is here today for a scheduled follow up office visit and is accompanied by his wife and nephew\par Admissions x 2 to [facility] since last office visit\par 7/22  patient was evaluated at [facility] for  an inability to ambulate and an overall general weakness. It was thought to be caused by the Levetiracetam so the dose was lowered from 750 mg BID to 500 mg BID\par \par Evaluated at [facility] on 7/8 for right elbow lesion erythema and was started on Keflex for 5 dany\par 7/11 EEG left temporal region\par \par Patient inquired about driving\par \par \par *** 4/7/23 **** \par No seizures, side effects since discharge spouse concurs. Seen by Dr. Davidson who requested a follow-up MRI, procedure performed this morning no report available at this time but to our eyes lesion appears stable. Mr. Albarran nquired about driving restrictions, we conveyed the [facility] regulations restricting driving for 12 months.\par \par HPI \par 77 year-old right handed man initially seen by Dr. Thakkar for stereotypical events characterized as appearing strange, transient amnesia that was occurring multiple times a day. Abundant typical events captured with left temporal evolution, clinically manifesting as profanities, and tachycardia terminated with  & Vimpat 200 mg BID. 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 Was seen by Dr. Helm, and later followed up by Dr. Davidson and case was discussed in [facility] conference. Overall, decision was made to observe the lesion and decide on biopsy given his age and comorbidities. \par

## 2023-07-31 LAB — GALACTOMANNAN AG SERPL-ACNC: 0.05 INDEX — SIGNIFICANT CHANGE UP (ref 0–0.49)

## 2023-08-18 ENCOUNTER — APPOINTMENT (OUTPATIENT)
Dept: INFECTIOUS DISEASE | Facility: CLINIC | Age: 78
End: 2023-08-18
Payer: MEDICARE

## 2023-08-18 VITALS
BODY MASS INDEX: 24.75 KG/M2 | HEIGHT: 64 IN | WEIGHT: 145 LBS | TEMPERATURE: 97.5 F | HEART RATE: 67 BPM | SYSTOLIC BLOOD PRESSURE: 119 MMHG | OXYGEN SATURATION: 95 % | DIASTOLIC BLOOD PRESSURE: 74 MMHG

## 2023-08-18 PROCEDURE — 99203 OFFICE O/P NEW LOW 30 MIN: CPT

## 2023-08-18 NOTE — HISTORY OF PRESENT ILLNESS
[FreeTextEntry1] : 77-yo M w/ PMH of focal seizures on Keppra/Vimpat, COPD/asthma, and recent admission 7/8-22 for generalized weakness. Neuro workup generally unrevealing. ID was consulted during the admission for R elbow findings.  Per patient, he has a fish tank and a pond at home. 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 Stated he has had R elbow "skin infection" for several years. He was reportedly seen and treated by outpatient dermatologist (non-[facility] provider). Patient stated that the infection was worse at some point with drainage, s/p treatment of unknown medication for unknown duration. Patient thinks the elbow lesion is overall getting better. Denies elbow pain or difficulty bending the elbow. Inpatient US w/ 0.5 x 0.5 x 0.8 cm olecranon fossa fluid collection. MRI not suggestive of OM. ESR 71 and . Biopsy and AFB culture were performed by inpatient derm - no AFB positivity. Bx revealed epidermal hyperplasia, and dense diffuse granulomatous inflammation.  A fax was received from outpatient dermatologist on previous culture result. Collected 6/27/23. Prelim result Mycobacterium marinum. Susceptibility not available.

## 2023-08-18 NOTE — ASSESSMENT
[FreeTextEntry1] : 77-yo M w/ PMH of focal seizures on Keppra/Vimpat, COPD/asthma, and recent admission 7/8-22 for generalized weakness, consulted inpatient for R elbow erythema. MRI not suggestive of OM. Outpatient derm culture was consistent with M marinum.   #Mycobacterium marinum infection - Pending susceptibility from [facility] (collected 6/27/23). - EKG was performed today. QTc 414 ms. - CBC w/ diff and CMP today - Ophthalmology referral provided. Plan to initiate clarithromycin and ethambutol. - Asked to fax in documentation from derm. - Referred to NTM clinic to be seen in 2-3 weeks

## 2023-08-18 NOTE — PHYSICAL EXAM
[General Appearance - Alert] : alert [General Appearance - In No Acute Distress] : in no acute distress [] : no respiratory distress [Auscultation Breath Sounds / Voice Sounds] : lungs were clear to auscultation bilaterally [Heart Rate And Rhythm] : heart rate was normal and rhythm regular [Heart Sounds] : normal S1 and S2 [Heart Sounds Pericardial Friction Rub] : no pericardial rub [Musculoskeletal - Swelling] : no joint swelling [Nail Clubbing] : no clubbing  or cyanosis of the fingernails [Motor Tone] : muscle strength and tone were normal [FreeTextEntry1] : R elbow with pinkish erythema, nontender, no nodule, no fluctuance,

## 2023-08-19 LAB
CULTURE RESULTS: SIGNIFICANT CHANGE UP
SPECIMEN SOURCE: SIGNIFICANT CHANGE UP

## 2023-08-21 LAB
ALBUMIN SERPL ELPH-MCNC: 4 G/DL
ALP BLD-CCNC: 101 U/L
ALT SERPL-CCNC: 11 U/L
ANION GAP SERPL CALC-SCNC: 10 MMOL/L
AST SERPL-CCNC: 14 U/L
BASOPHILS # BLD AUTO: 0.07 K/UL
BASOPHILS NFR BLD AUTO: 1.7 %
BILIRUB SERPL-MCNC: 0.2 MG/DL
BUN SERPL-MCNC: 14 MG/DL
CALCIUM SERPL-MCNC: 9 MG/DL
CHLORIDE SERPL-SCNC: 102 MMOL/L
CO2 SERPL-SCNC: 25 MMOL/L
CREAT SERPL-MCNC: 0.89 MG/DL
EGFR: 88 ML/MIN/1.73M2
EOSINOPHIL # BLD AUTO: 0.69 K/UL
EOSINOPHIL NFR BLD AUTO: 16.9 %
GLUCOSE SERPL-MCNC: 90 MG/DL
HCT VFR BLD CALC: 41.2 %
HGB BLD-MCNC: 12.2 G/DL
IMM GRANULOCYTES NFR BLD AUTO: 0.2 %
LYMPHOCYTES # BLD AUTO: 0.83 K/UL
LYMPHOCYTES NFR BLD AUTO: 20.3 %
MAN DIFF?: NORMAL
MCHC RBC-ENTMCNC: 24.5 PG
MCHC RBC-ENTMCNC: 29.6 GM/DL
MCV RBC AUTO: 82.9 FL
MONOCYTES # BLD AUTO: 0.66 K/UL
MONOCYTES NFR BLD AUTO: 16.2 %
NEUTROPHILS # BLD AUTO: 1.82 K/UL
NEUTROPHILS NFR BLD AUTO: 44.7 %
PLATELET # BLD AUTO: 248 K/UL
POTASSIUM SERPL-SCNC: 4.4 MMOL/L
PROT SERPL-MCNC: 6.9 G/DL
RBC # BLD: 4.97 M/UL
RBC # FLD: 16.9 %
SODIUM SERPL-SCNC: 137 MMOL/L
WBC # FLD AUTO: 4.08 K/UL

## 2023-08-23 ENCOUNTER — RX RENEWAL (OUTPATIENT)
Age: 78
End: 2023-08-23

## 2023-08-29 ENCOUNTER — APPOINTMENT (OUTPATIENT)
Dept: NEUROLOGY | Facility: CLINIC | Age: 78
End: 2023-08-29
Payer: MEDICARE

## 2023-08-29 VITALS
WEIGHT: 140 LBS | DIASTOLIC BLOOD PRESSURE: 76 MMHG | BODY MASS INDEX: 23.9 KG/M2 | HEIGHT: 64 IN | HEART RATE: 68 BPM | SYSTOLIC BLOOD PRESSURE: 149 MMHG

## 2023-08-29 DIAGNOSIS — G93.9 DISORDER OF BRAIN, UNSPECIFIED: ICD-10-CM

## 2023-08-29 PROCEDURE — 99214 OFFICE O/P EST MOD 30 MIN: CPT

## 2023-08-29 RX ORDER — LEVETIRACETAM 500 MG/1
500 TABLET, FILM COATED ORAL
Qty: 120 | Refills: 5 | Status: ACTIVE | COMMUNITY
Start: 2023-03-02 | End: 1900-01-01

## 2023-08-29 NOTE — PHYSICAL EXAM
[FreeTextEntry1] : alert and oriented x 3, speech fluent, names easily, follows requests, good recall for recent and remote events. EOM full without sustained nystagmus, PERRL, face symmetrical, no dysarthria Motor - full strength in all extremities. normal rapid-alternating movements. Sensory - intact LT bilaterally Coord - no tremor, ataxia Gait - stands without difficulty, normal gait.

## 2023-08-29 NOTE — ASSESSMENT
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 Had abundant left temporal seizures during his emu stay, a multidisciplinary meeting was held and decision was made to monitor the lesion and biopsy as appropriate. Fortunately seizures are well controlled and remains seizure free since discharge. - continue levetiracetam 500 mg BID, had low efficacy on seizure control during his emu stay - however, since he has resumed driving, we elected to continue levetiracetam though it may not be imparting much benefit.  - Continue with Vimpat 200 mg BID - Driving restrictions - patient received notification from DMV that he may resume driving.  - Continue to follow-up with Dr. Davidson and lesion surveillance (~Feb 2024) - repeat MRI + gado f/u study in Oct 2023 (last study Apr 2023)  I have spent 30 minutes or longer reviewing patient data or discussing with the patient the cause of seizures or seizure-like events and comorbid conditions, assessing the risk of recurrence, educating the patient or family to recognize seizures, discussing possible treatment options for seizures and comorbid conditions and documenting encounter and plan. More than 50% of time spent counseling and educating patient about epilepsy including safety issues, AED side effects and interactions, alcohol consumption, sleep deprivation, risks and driving privileges associated with the [facility]. Greater than 50% of the encounter time was spent on counseling and coordination of care for reviewing records in [employer], discussion with patient regarding plan.

## 2023-08-29 NOTE — HISTORY OF PRESENT ILLNESS
[FreeTextEntry1] : *** 08/29/2023  ***  Mr. CASTRO reports he is feeling entirely well, back to baseline. He received notification from [facility] that he may resume driving, and is very pleased about that.  He denies any interval events.  Since lowering levetiracetam at last visit, he reports no further side-effects.    *** 4/7/23 ****  No seizures, side effects since discharge spouse concurs. Seen by Dr. Davidson who requested a follow-up MRI, procedure performed this morning no report available at this time but to our eyes lesion appears stable. Mr. Castro nquired about driving restrictions, we conveyed the [facility] regulations restricting driving for 12 months.  HPI  77 year-old right handed man initially seen by Dr. Thakkar for stereotypical events characterized as appearing strange, transient amnesia that was occurring multiple times a day. Abundant typical events captured with left temporal evolution, clinically manifesting as profanities, and tachycardia terminated with  & Vimpat 200 mg BID. 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 Was seen by Dr. Helm, and later followed up by Dr. Davidson and case was discussed in [facility] conference. Overall, decision was made to observe the lesion and decide on biopsy given his age and comorbidities.

## 2023-09-06 LAB
CULTURE RESULTS: SIGNIFICANT CHANGE UP
SPECIMEN SOURCE: SIGNIFICANT CHANGE UP

## 2023-09-08 ENCOUNTER — NON-APPOINTMENT (OUTPATIENT)
Age: 78
End: 2023-09-08

## 2023-09-08 ENCOUNTER — APPOINTMENT (OUTPATIENT)
Dept: OPHTHALMOLOGY | Facility: CLINIC | Age: 78
End: 2023-09-08
Payer: MEDICARE

## 2023-09-08 PROCEDURE — 92004 COMPRE OPH EXAM NEW PT 1/>: CPT

## 2023-09-12 ENCOUNTER — APPOINTMENT (OUTPATIENT)
Dept: INFECTIOUS DISEASE | Facility: CLINIC | Age: 78
End: 2023-09-12
Payer: MEDICARE

## 2023-09-12 ENCOUNTER — LABORATORY RESULT (OUTPATIENT)
Age: 78
End: 2023-09-12

## 2023-09-12 VITALS
HEART RATE: 70 BPM | SYSTOLIC BLOOD PRESSURE: 126 MMHG | DIASTOLIC BLOOD PRESSURE: 77 MMHG | BODY MASS INDEX: 24.41 KG/M2 | RESPIRATION RATE: 16 BRPM | HEIGHT: 64 IN | TEMPERATURE: 98.4 F | WEIGHT: 143 LBS | OXYGEN SATURATION: 98 %

## 2023-09-12 LAB
ALBUMIN SERPL ELPH-MCNC: 4.2 G/DL
ALP BLD-CCNC: 97 U/L
ALT SERPL-CCNC: 8 U/L
ANION GAP SERPL CALC-SCNC: 10 MMOL/L
AST SERPL-CCNC: 18 U/L
BILIRUB SERPL-MCNC: 0.6 MG/DL
BUN SERPL-MCNC: 15 MG/DL
CALCIUM SERPL-MCNC: 9 MG/DL
CHLORIDE SERPL-SCNC: 100 MMOL/L
CO2 SERPL-SCNC: 24 MMOL/L
CREAT SERPL-MCNC: 0.85 MG/DL
EGFR: 88 ML/MIN/1.73M2
GLUCOSE SERPL-MCNC: 93 MG/DL
POTASSIUM SERPL-SCNC: 4 MMOL/L
PROT SERPL-MCNC: 7 G/DL
SODIUM SERPL-SCNC: 134 MMOL/L

## 2023-09-12 PROCEDURE — 99213 OFFICE O/P EST LOW 20 MIN: CPT

## 2023-09-15 LAB
BASOPHILS # BLD AUTO: 0.1 K/UL
BASOPHILS NFR BLD AUTO: 1.6 %
EOSINOPHIL # BLD AUTO: 1.35 K/UL
EOSINOPHIL NFR BLD AUTO: 22.1 %
HCT VFR BLD CALC: 40.9 %
HGB BLD-MCNC: 12.8 G/DL
IMM GRANULOCYTES NFR BLD AUTO: 0.3 %
LYMPHOCYTES # BLD AUTO: 0.86 K/UL
LYMPHOCYTES NFR BLD AUTO: 14.1 %
MAN DIFF?: NORMAL
MCHC RBC-ENTMCNC: 25.8 PG
MCHC RBC-ENTMCNC: 31.3 GM/DL
MCV RBC AUTO: 82.5 FL
MONOCYTES # BLD AUTO: 0.57 K/UL
MONOCYTES NFR BLD AUTO: 9.3 %
NEUTROPHILS # BLD AUTO: 3.2 K/UL
NEUTROPHILS NFR BLD AUTO: 52.6 %
PLATELET # BLD AUTO: 252 K/UL
RBC # BLD: 4.96 M/UL
RBC # FLD: 16.3 %
WBC # FLD AUTO: 6.1 K/UL

## 2023-09-21 ENCOUNTER — APPOINTMENT (OUTPATIENT)
Dept: NEUROLOGY | Facility: CLINIC | Age: 78
End: 2023-09-21
Payer: MEDICARE

## 2023-09-21 VITALS
SYSTOLIC BLOOD PRESSURE: 127 MMHG | HEART RATE: 59 BPM | WEIGHT: 143 LBS | DIASTOLIC BLOOD PRESSURE: 67 MMHG | HEIGHT: 64 IN | BODY MASS INDEX: 24.41 KG/M2

## 2023-09-21 DIAGNOSIS — A31.1 CUTANEOUS MYCOBACTERIAL INFECTION: ICD-10-CM

## 2023-09-21 PROCEDURE — 99214 OFFICE O/P EST MOD 30 MIN: CPT

## 2023-10-23 ENCOUNTER — APPOINTMENT (OUTPATIENT)
Dept: INFECTIOUS DISEASE | Facility: CLINIC | Age: 78
End: 2023-10-23
Payer: MEDICARE

## 2023-10-23 ENCOUNTER — OUTPATIENT (OUTPATIENT)
Dept: OUTPATIENT SERVICES | Facility: HOSPITAL | Age: 78
LOS: 1 days | End: 2023-10-23
Payer: MEDICARE

## 2023-10-23 ENCOUNTER — APPOINTMENT (OUTPATIENT)
Dept: MRI IMAGING | Facility: CLINIC | Age: 78
End: 2023-10-23
Payer: MEDICARE

## 2023-10-23 VITALS
HEIGHT: 64 IN | DIASTOLIC BLOOD PRESSURE: 66 MMHG | SYSTOLIC BLOOD PRESSURE: 126 MMHG | TEMPERATURE: 97.6 F | BODY MASS INDEX: 24.07 KG/M2 | HEART RATE: 74 BPM | WEIGHT: 141 LBS | OXYGEN SATURATION: 97 %

## 2023-10-23 DIAGNOSIS — G93.9 DISORDER OF BRAIN, UNSPECIFIED: ICD-10-CM

## 2023-10-23 DIAGNOSIS — G40.109 LOCALIZATION-RELATED (FOCAL) (PARTIAL) SYMPTOMATIC EPILEPSY AND EPILEPTIC SYNDROMES WITH SIMPLE PARTIAL SEIZURES, NOT INTRACTABLE, WITHOUT STATUS EPILEPTICUS: ICD-10-CM

## 2023-10-23 DIAGNOSIS — Z98.890 OTHER SPECIFIED POSTPROCEDURAL STATES: Chronic | ICD-10-CM

## 2023-10-23 DIAGNOSIS — Z23 ENCOUNTER FOR IMMUNIZATION: ICD-10-CM

## 2023-10-23 LAB
ALBUMIN SERPL ELPH-MCNC: 4.1 G/DL
ALP BLD-CCNC: 93 U/L
ALT SERPL-CCNC: 18 U/L
ANION GAP SERPL CALC-SCNC: 9 MMOL/L
AST SERPL-CCNC: 22 U/L
BASOPHILS # BLD AUTO: 0.11 K/UL
BASOPHILS NFR BLD AUTO: 1.9 %
BILIRUB SERPL-MCNC: 0.6 MG/DL
BUN SERPL-MCNC: 17 MG/DL
CALCIUM SERPL-MCNC: 9.2 MG/DL
CHLORIDE SERPL-SCNC: 102 MMOL/L
CO2 SERPL-SCNC: 26 MMOL/L
CREAT SERPL-MCNC: 0.97 MG/DL
EGFR: 79 ML/MIN/1.73M2
EOSINOPHIL # BLD AUTO: 1.1 K/UL
EOSINOPHIL NFR BLD AUTO: 18.6 %
GLUCOSE SERPL-MCNC: 90 MG/DL
HCT VFR BLD CALC: 43.6 %
HGB BLD-MCNC: 13.6 G/DL
IMM GRANULOCYTES NFR BLD AUTO: 0.2 %
LYMPHOCYTES # BLD AUTO: 0.84 K/UL
LYMPHOCYTES NFR BLD AUTO: 14.2 %
MAN DIFF?: NORMAL
MCHC RBC-ENTMCNC: 26.6 PG
MCHC RBC-ENTMCNC: 31.2 GM/DL
MCV RBC AUTO: 85.2 FL
MONOCYTES # BLD AUTO: 0.48 K/UL
MONOCYTES NFR BLD AUTO: 8.1 %
NEUTROPHILS # BLD AUTO: 3.37 K/UL
NEUTROPHILS NFR BLD AUTO: 57 %
PLATELET # BLD AUTO: 237 K/UL
POTASSIUM SERPL-SCNC: 4.7 MMOL/L
PROT SERPL-MCNC: 7 G/DL
RBC # BLD: 5.12 M/UL
RBC # FLD: 16.1 %
SODIUM SERPL-SCNC: 137 MMOL/L
WBC # FLD AUTO: 5.91 K/UL

## 2023-10-23 PROCEDURE — 76377 3D RENDER W/INTRP POSTPROCES: CPT

## 2023-10-23 PROCEDURE — 99213 OFFICE O/P EST LOW 20 MIN: CPT | Mod: 25

## 2023-10-23 PROCEDURE — 90662 IIV NO PRSV INCREASED AG IM: CPT

## 2023-10-23 PROCEDURE — G0008: CPT

## 2023-10-23 PROCEDURE — 70553 MRI BRAIN STEM W/O & W/DYE: CPT

## 2023-10-23 PROCEDURE — 70553 MRI BRAIN STEM W/O & W/DYE: CPT | Mod: 26,MH

## 2023-11-06 ENCOUNTER — RX RENEWAL (OUTPATIENT)
Age: 78
End: 2023-11-06

## 2023-11-10 ENCOUNTER — APPOINTMENT (OUTPATIENT)
Dept: OPHTHALMOLOGY | Facility: CLINIC | Age: 78
End: 2023-11-10
Payer: MEDICARE

## 2023-11-10 ENCOUNTER — NON-APPOINTMENT (OUTPATIENT)
Age: 78
End: 2023-11-10

## 2023-11-10 PROCEDURE — 92083 EXTENDED VISUAL FIELD XM: CPT

## 2023-11-10 PROCEDURE — 92012 INTRM OPH EXAM EST PATIENT: CPT

## 2023-12-04 ENCOUNTER — APPOINTMENT (OUTPATIENT)
Dept: INFECTIOUS DISEASE | Facility: CLINIC | Age: 78
End: 2023-12-04
Payer: MEDICARE

## 2023-12-04 VITALS
OXYGEN SATURATION: 98 % | BODY MASS INDEX: 23.56 KG/M2 | DIASTOLIC BLOOD PRESSURE: 76 MMHG | TEMPERATURE: 97.7 F | HEART RATE: 70 BPM | HEIGHT: 64 IN | WEIGHT: 138 LBS | SYSTOLIC BLOOD PRESSURE: 129 MMHG

## 2023-12-04 LAB
ALBUMIN SERPL ELPH-MCNC: 4.1 G/DL
ALP BLD-CCNC: 104 U/L
ALT SERPL-CCNC: 20 U/L
ANION GAP SERPL CALC-SCNC: 10 MMOL/L
AST SERPL-CCNC: 25 U/L
BASOPHILS # BLD AUTO: 0.09 K/UL
BASOPHILS NFR BLD AUTO: 1.6 %
BILIRUB SERPL-MCNC: 0.4 MG/DL
BUN SERPL-MCNC: 14 MG/DL
CALCIUM SERPL-MCNC: 9.5 MG/DL
CHLORIDE SERPL-SCNC: 103 MMOL/L
CO2 SERPL-SCNC: 26 MMOL/L
CREAT SERPL-MCNC: 0.92 MG/DL
EGFR: 85 ML/MIN/1.73M2
EOSINOPHIL # BLD AUTO: 1.09 K/UL
EOSINOPHIL NFR BLD AUTO: 19.4 %
GLUCOSE SERPL-MCNC: 83 MG/DL
HCT VFR BLD CALC: 44.1 %
HGB BLD-MCNC: 14.1 G/DL
IMM GRANULOCYTES NFR BLD AUTO: 0.4 %
LYMPHOCYTES # BLD AUTO: 0.54 K/UL
LYMPHOCYTES NFR BLD AUTO: 9.6 %
MAN DIFF?: NORMAL
MCHC RBC-ENTMCNC: 27.4 PG
MCHC RBC-ENTMCNC: 32 GM/DL
MCV RBC AUTO: 85.8 FL
MONOCYTES # BLD AUTO: 0.68 K/UL
MONOCYTES NFR BLD AUTO: 12.1 %
NEUTROPHILS # BLD AUTO: 3.21 K/UL
NEUTROPHILS NFR BLD AUTO: 56.9 %
PLATELET # BLD AUTO: 208 K/UL
POTASSIUM SERPL-SCNC: 4.6 MMOL/L
PROT SERPL-MCNC: 6.9 G/DL
RBC # BLD: 5.14 M/UL
RBC # FLD: 15.2 %
SODIUM SERPL-SCNC: 138 MMOL/L
WBC # FLD AUTO: 5.63 K/UL

## 2023-12-04 PROCEDURE — 99213 OFFICE O/P EST LOW 20 MIN: CPT

## 2024-01-08 ENCOUNTER — LABORATORY RESULT (OUTPATIENT)
Age: 79
End: 2024-01-08

## 2024-01-08 ENCOUNTER — APPOINTMENT (OUTPATIENT)
Dept: NEUROLOGY | Facility: CLINIC | Age: 79
End: 2024-01-08
Payer: MEDICARE

## 2024-01-08 ENCOUNTER — NON-APPOINTMENT (OUTPATIENT)
Age: 79
End: 2024-01-08

## 2024-01-08 VITALS — SYSTOLIC BLOOD PRESSURE: 142 MMHG | DIASTOLIC BLOOD PRESSURE: 71 MMHG | HEART RATE: 60 BPM

## 2024-01-08 DIAGNOSIS — G40.109 LOCALIZATION-RELATED (FOCAL) (PARTIAL) SYMPTOMATIC EPILEPSY AND EPILEPTIC SYNDROMES WITH SIMPLE PARTIAL SEIZURES, NOT INTRACTABLE, W/OUT STATUS EPILEPTICUS: ICD-10-CM

## 2024-01-08 LAB
CK SERPL-CCNC: 91 U/L
CRP SERPL-MCNC: <3 MG/L
ERYTHROCYTE [SEDIMENTATION RATE] IN BLOOD BY WESTERGREN METHOD: 2 MM/HR
FOLATE SERPL-MCNC: 9.1 NG/ML
HAV IGM SER QL: NONREACTIVE
HBV CORE IGM SER QL: NONREACTIVE
HBV SURFACE AG SER QL: NONREACTIVE
HCV AB SER QL: NONREACTIVE
HCV S/CO RATIO: 0.18 S/CO
HCYS SERPL-MCNC: 13 UMOL/L
HIV1+2 AB SPEC QL IA.RAPID: NONREACTIVE
RHEUMATOID FACT SER QL: <10 IU/ML
TSH SERPL-ACNC: 1.06 UIU/ML
VIT B12 SERPL-MCNC: 397 PG/ML

## 2024-01-08 PROCEDURE — 99214 OFFICE O/P EST MOD 30 MIN: CPT

## 2024-01-08 NOTE — CONSULT LETTER
[Dear  ___] : Dear  [unfilled], [Consult Letter:] : I had the pleasure of evaluating your patient, [unfilled]. [Please see my note below.] : Please see my note below. [Consult Closing:] : Thank you very much for allowing me to participate in the care of this patient.  If you have any questions, please do not hesitate to contact me. [Sincerely,] : Sincerely, [FreeTextEntry3] : Galdino Thakkar MD\par   [DrSandy  ___] : Dr. ANN [DrSandy ___] : Dr. ANN

## 2024-01-08 NOTE — HISTORY OF PRESENT ILLNESS
[FreeTextEntry1] : Mr. Aj Albarran returned to the office having been last seen on 2023.  He is a 78-year-old right-handed gentleman who suffers from asthma.  Since receiving his COVID-19 booster 2 years ago, he experiences occasional burning feet.  He is otherwise well.  Since 2022, he had experienced about 30 stereotypical episodes.  He suddenly felt strange but could not describe his symptoms in more detail.  According to Armida, he did not immediately recall the event.  He looked strange.  He denied other aura like symptoms, headaches or history of head trauma.  His episodeswere increasing in frequency occurring several times a day.  25 years ago he lost consciousness while at a  but has never had any other episodes of loss of consciousness.  He denied chest pains, palpitations or shortness of breath.  He underwent a CT of the brain at [facility] on .  That study was unremarkable.  I felt that his symptoms were due to partial complex seizures.  MRI of the brain revealed abnormal T2 and FLAIR hyperintense signal within the left medial temporal lobe in the region of the amygdala and hippocampus.  There was mild mass effect on the temporal horn.  There was abnormal FLAIR hyperintense signal and enlargement of the left ophthalmic vein.  There was extensive mucosal thickening throughout the paranasal sinuses.  There was mild white matter disease.  It was decided to monitor the temporal lobe lesion rather than biopsy.  He was treated with levetiracetam and lacosamide.  He was hospitalized on July 10 when he experienced an episode of word finding difficulties.  EEG revealed left temporal slowing without epileptiform activity.  MRI of the brain revealed increased size of the left temporal lobe compared to the right without abnormal signal.  Creatinine was elevated which was nonspecific.  OLI was normal.  He was readmitted with fatigue on .  His Keppra dose was decreased from 750 to 500 mg twice a day and he was continued on lacosamide 200 mg twice a day.  He had a longstanding right elbow infection.  Cultures grew Mycobacterium marinum.  He is being treated with clarithromycin and ethambutol.  He reports absence of any symptoms of seizures.  He feels quite well.  He however mentioned in passing that he has had burning feet at night for well over a year.  This predated treatment with ethambutol, clarithromycin, levetiracetam and lacosamide.  He denies involvement of his hands.   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  There was asymmetry of the temporal lobes with right temporal horn being slightly more prominent than the left.  Close interval follow-up was recommended.  Past surgical history notable for left shoulder procedure.  He suffers from asthma.  There is no history of hypertension, diabetes, hyperlipidemia, cardiac, renal, hepatic, gastrointestinal, thyroid, hematologic or cerebrovascular disease.  He has no allergies.  Medications include ethambutol, clarithromycin, levetiracetam, lacosamide and montelukast.  He is a former pipe smoker and drinks socially.  He is  and is a retired .  Family history is notable for sudden death in his brother and father and thyroid disease in his mother.

## 2024-01-08 NOTE — ASSESSMENT
[FreeTextEntry1] : Mr. 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  He is seizure disorder appears well-controlled on lacosamide and levetiracetam.  A follow-up MRI of the brain performed in October revealed no change in the temporal lobe asymmetry.  He is scheduled to see Dr. Jovanny Sweet soon.  I will communicate with Dr. Davidson of neurosurgery as well.  Clinically, he appears to be complaining of symptoms consistent with a sensory polyneuropathy which predated treatment with ethambutol and clarithromycin.  I suggested a serologic evaluation and return for EMG and nerve conduction studies.  Further management will depend upon his clinical course.

## 2024-01-08 NOTE — PHYSICAL EXAM
[FreeTextEntry1] : Constitutional:  Patient was well-developed, well-nourished and in no acute distress.   Head:  Normocephalic, atraumatic. Tympanic membranes were not examined.   Neck:  Supple with full range of motion.   Cardiovascular:  Cardiac rhythm was regular without murmur. There were no carotid bruits. Peripheral pulses were full and symmetric.    Respiratory:  Lungs were clear.   Abdomen:  Soft and nontender.   Spine:  Nontender.   Skin:  There were no rashes.   NEUROLOGICAL EXAMINATION:  Mental Status: Patient was alert and oriented. Speech was fluent. There was no dysarthria.   Cranial Nerves:   II: He could finger count bilaterally. Pupils were equal and reactive. Visual fields were full.  Fundi were not visualized.  III, IV, VI:  Eye movements were full without nystagmus.   V: Facial sensation was intact.   VII: Facial strength was normal.   VIII: Hearing was equal. Nylen Barany maneuver was negative.  IX, X: Palatal movement was normal. Phonation was normal.   XI: Sternocleidomastoids and trapezii were normal.   XII: Tongue was midline and movements normal. There was no lingual atrophy or fasciculations.   Motor Examination: Muscle bulk, tone and strength were normal.   Sensory Examination: Joint position sense was intact.  There was mild decreased vibration sense in the toes.  There is shading of pinprick in a stocking distribution.  Reflexes: DTRs were 1 at the biceps and triceps, 2 at the knees and ankles and absent at the brachioradialis.  Plantar Responses: Plantar responses were flexor.   Coordination/Cerebellar Function: There was no dysmetria on finger to nose or heel to shin testing.   Gait/Stance: Gait and tandem were normal. Romberg was negative.

## 2024-01-09 LAB
CCP AB SER IA-ACNC: <8 UNITS
ESTIMATED AVERAGE GLUCOSE: 111 MG/DL
HBA1C MFR BLD HPLC: 5.5 %
RF+CCP IGG SER-IMP: NEGATIVE

## 2024-01-10 LAB — CELIACPAN: NORMAL

## 2024-01-11 LAB
ALBUMIN MFR SERPL ELPH: 59.9 %
ALBUMIN SERPL-MCNC: 4.5 G/DL
ALBUMIN/GLOB SERPL: 1.5 RATIO
ALPHA1 GLOB MFR SERPL ELPH: 3.7 %
ALPHA1 GLOB SERPL ELPH-MCNC: 0.3 G/DL
ALPHA2 GLOB MFR SERPL ELPH: 8.1 %
ALPHA2 GLOB SERPL ELPH-MCNC: 0.6 G/DL
ANACR T: NEGATIVE
ASIALO-GM1 ANTIBODIES, IGG/IGM: 12 IV
B-GLOBULIN MFR SERPL ELPH: 13.2 %
B-GLOBULIN SERPL ELPH-MCNC: 1 G/DL
COPPER SERPL-MCNC: 94 UG/DL
DEPRECATED KAPPA LC FREE/LAMBDA SER: 1.82 RATIO
GAMMA GLOB FLD ELPH-MCNC: 1.1 G/DL
GAMMA GLOB MFR SERPL ELPH: 15.1 %
GD1A ANTIBODIES, IGG/IGM: 8 IV
GD1B ANTIBODIES, IGG/IGM: 20 IV
GM1 ANTIBODIES, IGG/IGM: 9 IV
GM2 ANTIBODIES, IGG/IGM: 11 IV
GQ1B ANTIBODIES, IGG/IGM: 11 IV
IGA 24H UR QL IFE: NORMAL
IGA SER QL IEP: 201 MG/DL
IGG SER QL IEP: 1007 MG/DL
IGM SER QL IEP: 210 MG/DL
INTERPRETATION SERPL IEP-IMP: NORMAL
KAPPA LC CSF-MCNC: 0.96 MG/DL
KAPPA LC SERPL-MCNC: 1.75 MG/DL
M PROTEIN SPEC IFE-MCNC: NORMAL
PROT SERPL-MCNC: 7.5 G/DL
PROT SERPL-MCNC: 7.5 G/DL
T PALLIDUM AB SER QL IA: NEGATIVE
ZINC SERPL-MCNC: 99 UG/DL

## 2024-01-12 ENCOUNTER — NON-APPOINTMENT (OUTPATIENT)
Age: 79
End: 2024-01-12

## 2024-01-12 ENCOUNTER — APPOINTMENT (OUTPATIENT)
Dept: OPHTHALMOLOGY | Facility: CLINIC | Age: 79
End: 2024-01-12
Payer: MEDICARE

## 2024-01-12 LAB
A-TOCOPHEROL VIT E SERPL-MCNC: 11.9 MG/L
BETA+GAMMA TOCOPHEROL SERPL-MCNC: 1.3 MG/L
VASCULAR ENDOTHELIAL GF: 270 PG/ML
VIT B1 SERPL-MCNC: 90.9 NMOL/L

## 2024-01-12 PROCEDURE — 92012 INTRM OPH EXAM EST PATIENT: CPT

## 2024-01-12 PROCEDURE — 92283 EXTND COLOR VISION XM: CPT

## 2024-01-12 PROCEDURE — 92083 EXTENDED VISUAL FIELD XM: CPT

## 2024-01-13 LAB
METHYLMALONATE SERPL-SCNC: 123 NMOL/L
VIT B6 SERPL-MCNC: 6.4 UG/L

## 2024-01-16 LAB
ARSENIC BLD-MCNC: 2 UG/L
CADMIUM SERPL-MCNC: 1 UG/L
LEAD BLD-MCNC: 2.5 UG/DL
MERCURY BLD-MCNC: <1 UG/L

## 2024-01-17 LAB
MISCELLANEOUS TEST: NORMAL
PROC NAME: NORMAL

## 2024-01-18 LAB
AMPA-R ABCBA: NEGATIVE
AMPHIPHYSIN IGG TITR SER IF: NEGATIVE
ANNOTATION COMMENT IMP: NORMAL
CASPR2-IGG CBA, S: ABNORMAL
CV2 IGG TITR SER: NEGATIVE
GABA-B ABCBA: NEGATIVE
GAD65 AB SER-MCNC: 0.01 NMOL/L
GLIAL NUC TYPE 1 AB TITR SER: NEGATIVE
HTLV I+II AB SER QL: NORMAL
HU1 AB TITR SER: NEGATIVE
HU2 AB TITR SER IF: NEGATIVE
HU3 AB TITR SER: NEGATIVE
IGLON5 IFA, S: NEGATIVE
IMMUNOLOGIST REVIEW: NORMAL
LGI1-IGG CBA, S: NEGATIVE
MAG AB SER QL: NEGATIVE
NIF IFA, S: NEGATIVE
NMDA-R ABCBA: NEGATIVE
PCA-1 AB TITR SER: NEGATIVE
PCA-2 AB TITR SER: NEGATIVE
PCA-TR AB TITR SER: NEGATIVE

## 2024-01-21 LAB — SENSORIMOTOR NEUROPATHY PROFILE W/ RECOMBX: NORMAL

## 2024-01-24 LAB — HEREDITARY NEUROPATHY PANEL: ABNORMAL

## 2024-01-25 ENCOUNTER — TRANSCRIPTION ENCOUNTER (OUTPATIENT)
Age: 79
End: 2024-01-25

## 2024-01-25 ENCOUNTER — RESULT REVIEW (OUTPATIENT)
Age: 79
End: 2024-01-25

## 2024-01-25 DIAGNOSIS — C80.1 OTHER DISORDERS OF NERVOUS SYSTEM: ICD-10-CM

## 2024-01-25 DIAGNOSIS — G98.8 OTHER DISORDERS OF NERVOUS SYSTEM: ICD-10-CM

## 2024-01-26 ENCOUNTER — APPOINTMENT (OUTPATIENT)
Dept: INFECTIOUS DISEASE | Facility: CLINIC | Age: 79
End: 2024-01-26
Payer: MEDICARE

## 2024-01-26 VITALS
BODY MASS INDEX: 25.75 KG/M2 | OXYGEN SATURATION: 97 % | HEART RATE: 62 BPM | WEIGHT: 150 LBS | DIASTOLIC BLOOD PRESSURE: 73 MMHG | SYSTOLIC BLOOD PRESSURE: 142 MMHG | TEMPERATURE: 97 F

## 2024-01-26 LAB
ALBUMIN SERPL ELPH-MCNC: 4.3 G/DL
ALP BLD-CCNC: 98 U/L
ALT SERPL-CCNC: 22 U/L
ANION GAP SERPL CALC-SCNC: 12 MMOL/L
AST SERPL-CCNC: 24 U/L
BASOPHILS # BLD AUTO: 0.09 K/UL
BASOPHILS NFR BLD AUTO: 1.4 %
BILIRUB SERPL-MCNC: 0.7 MG/DL
BUN SERPL-MCNC: 19 MG/DL
CALCIUM SERPL-MCNC: 9 MG/DL
CHLORIDE SERPL-SCNC: 102 MMOL/L
CO2 SERPL-SCNC: 25 MMOL/L
CREAT SERPL-MCNC: 0.92 MG/DL
EGFR: 85 ML/MIN/1.73M2
EOSINOPHIL # BLD AUTO: 0.97 K/UL
EOSINOPHIL NFR BLD AUTO: 14.9 %
GLUCOSE SERPL-MCNC: 87 MG/DL
HCT VFR BLD CALC: 43.8 %
HGB BLD-MCNC: 14.2 G/DL
IMM GRANULOCYTES NFR BLD AUTO: 0.5 %
LYMPHOCYTES # BLD AUTO: 0.71 K/UL
LYMPHOCYTES NFR BLD AUTO: 10.9 %
MAN DIFF?: NORMAL
MCHC RBC-ENTMCNC: 28 PG
MCHC RBC-ENTMCNC: 32.4 GM/DL
MCV RBC AUTO: 86.4 FL
MONOCYTES # BLD AUTO: 0.57 K/UL
MONOCYTES NFR BLD AUTO: 8.7 %
NEUTROPHILS # BLD AUTO: 4.15 K/UL
NEUTROPHILS NFR BLD AUTO: 63.6 %
PLATELET # BLD AUTO: 233 K/UL
POTASSIUM SERPL-SCNC: 4.3 MMOL/L
PROT SERPL-MCNC: 6.8 G/DL
RBC # BLD: 5.07 M/UL
RBC # FLD: 15.2 %
SODIUM SERPL-SCNC: 139 MMOL/L
WBC # FLD AUTO: 6.52 K/UL

## 2024-01-26 PROCEDURE — 99213 OFFICE O/P EST LOW 20 MIN: CPT

## 2024-01-26 RX ORDER — CLARITHROMYCIN 500 MG/1
500 TABLET, FILM COATED ORAL
Qty: 60 | Refills: 0 | Status: ACTIVE | COMMUNITY
Start: 2023-08-21 | End: 1900-01-01

## 2024-01-26 RX ORDER — ETHAMBUTOL HYDROCHLORIDE 400 MG/1
400 TABLET ORAL
Qty: 90 | Refills: 0 | Status: ACTIVE | COMMUNITY
Start: 2023-08-21 | End: 1900-01-01

## 2024-01-26 NOTE — ASSESSMENT
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 MRI not suggestive of OM. Outpatient derm culture was consistent with M marinum.  #Mycobacterium marinum infection - EKG was performed prior visit. QTc 414 ms. - CBC w/ diff and CMP today - Ophthalmology UTD.  Ishihara normal today. - Continue ethambutol and clarithromycin daily. - Will need 1-2 months therapy after lesion is resolved.  Now at 3.5 months.  Treat ~ 6 months thru 2/21/2024.  - RTC 6 weeks - high dose flu vaccine prior visit. - will be off meds for M. marinum at next visit.

## 2024-01-26 NOTE — HISTORY OF PRESENT ILLNESS
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 Neuro workup generally unrevealing. ID was consulted during the admission for R elbow findings.  Per patient, he has a fish tank and a pond at home. 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 Stated he has had R elbow "skin infection" for several years. He was reportedly seen and treated by outpatient dermatologist (non-[facility] provider). Patient stated that the infection was worse at some point with drainage, s/p treatment of unknown medication for unknown duration. Patient thinks the elbow lesion is overall getting better. Denies elbow pain or difficulty bending the elbow. Inpatient US w/ 0.5 x 0.5 x 0.8 cm olecranon fossa fluid collection. MRI not suggestive of OM. ESR 71 and . Biopsy and AFB culture were performed by inpatient derm - no AFB positivity. Bx revealed epidermal hyperplasia, and dense diffuse granulomatous inflammation.  A fax was received from outpatient dermatologist on previous culture result. Collected 6/27/23. Prelim result Mycobacterium marinum. Susceptibility not available.  9/12/2023: Doing well. Tolerating meds. Has been to ophthal. Elbow a bit better, less red.  On meds about 1 month now. Since 8/21/2023.   10/23/2003: Doing well.  On meds 2.5 months now. Lesion decreasing at right elbow. Less red. More flat, not raised.  No change in vision.  12/4/2023: Doing well. No new complaints. Area at right elbow has continued to improve/shrink. Tolerating meds. Lost some wt.    1/26/2024: Doing well. No new complaints.  Tolerating meds.   Gained weight back.  Has 1 more month left of M. Marinum meds. Saw neuro for peripheral neuropathy burning on feet. W/up pending.  Now has no burning he says since he saw the doctor, ironically.

## 2024-01-26 NOTE — PHYSICAL EXAM
[General Appearance - Alert] : alert [General Appearance - In No Acute Distress] : in no acute distress [Sclera] : the sclera and conjunctiva were normal [Extraocular Movements] : extraocular movements were intact [Outer Ear] : the ears and nose were normal in appearance [Neck Appearance] : the appearance of the neck was normal [] : no respiratory distress [Auscultation Breath Sounds / Voice Sounds] : lungs were clear to auscultation bilaterally [Heart Rate And Rhythm] : heart rate was normal and rhythm regular [Heart Sounds] : normal S1 and S2 [Heart Sounds Pericardial Friction Rub] : no pericardial rub [Bowel Sounds] : normal bowel sounds [Abdomen Soft] : soft [Musculoskeletal - Swelling] : no joint swelling [Motor Tone] : muscle strength and tone were normal [No Focal Deficits] : no focal deficits [Oriented To Time, Place, And Person] : oriented to person, place, and time [Affect] : the affect was normal [FreeTextEntry1] : R elbow with pinkish erythema, nontender, no nodule, no fluctuance,  Rash decreased in size and flat now

## 2024-02-03 ENCOUNTER — APPOINTMENT (OUTPATIENT)
Dept: CT IMAGING | Facility: CLINIC | Age: 79
End: 2024-02-03
Payer: MEDICARE

## 2024-02-03 ENCOUNTER — OUTPATIENT (OUTPATIENT)
Dept: OUTPATIENT SERVICES | Facility: HOSPITAL | Age: 79
LOS: 1 days | End: 2024-02-03
Payer: MEDICARE

## 2024-02-03 DIAGNOSIS — Z98.890 OTHER SPECIFIED POSTPROCEDURAL STATES: Chronic | ICD-10-CM

## 2024-02-03 DIAGNOSIS — G98.8 OTHER DISORDERS OF NERVOUS SYSTEM: ICD-10-CM

## 2024-02-03 PROCEDURE — 71260 CT THORAX DX C+: CPT

## 2024-02-03 PROCEDURE — 74177 CT ABD & PELVIS W/CONTRAST: CPT

## 2024-02-03 PROCEDURE — 71260 CT THORAX DX C+: CPT | Mod: 26,MH

## 2024-02-03 PROCEDURE — 74177 CT ABD & PELVIS W/CONTRAST: CPT | Mod: 26,MH

## 2024-02-05 ENCOUNTER — NON-APPOINTMENT (OUTPATIENT)
Age: 79
End: 2024-02-05

## 2024-02-05 LAB
AMPA-R ABCBA: NEGATIVE
AMPHIPHYSIN IGG TITR SER IF: NEGATIVE
ANNOTATION COMMENT IMP: NORMAL
CASPR2-IGG CBA, S: ABNORMAL
CV2 IGG TITR SER: NEGATIVE
GABA-B ABCBA: NEGATIVE
GAD65 AB SER-MCNC: 0 NMOL/L
GLIAL NUC TYPE 1 AB TITR SER: NEGATIVE
HU1 AB TITR SER: NEGATIVE
HU2 AB TITR SER IF: NEGATIVE
HU3 AB TITR SER: NEGATIVE
IGLON5 IFA, S: NEGATIVE
IMMUNOLOGIST REVIEW: NORMAL
LGI1-IGG CBA, S: NEGATIVE
NIF IFA, S: NEGATIVE
NMDA-R ABCBA: NEGATIVE
PCA-1 AB TITR SER: NEGATIVE
PCA-2 AB TITR SER: NEGATIVE
PCA-TR AB TITR SER: NEGATIVE

## 2024-02-08 ENCOUNTER — NON-APPOINTMENT (OUTPATIENT)
Age: 79
End: 2024-02-08

## 2024-02-29 ENCOUNTER — APPOINTMENT (OUTPATIENT)
Dept: NEUROLOGY | Facility: CLINIC | Age: 79
End: 2024-02-29
Payer: MEDICARE

## 2024-02-29 VITALS
WEIGHT: 145 LBS | HEART RATE: 67 BPM | SYSTOLIC BLOOD PRESSURE: 127 MMHG | HEIGHT: 64 IN | BODY MASS INDEX: 24.75 KG/M2 | DIASTOLIC BLOOD PRESSURE: 72 MMHG

## 2024-02-29 DIAGNOSIS — R20.8 OTHER DISTURBANCES OF SKIN SENSATION: ICD-10-CM

## 2024-02-29 PROCEDURE — 95911 NRV CNDJ TEST 9-10 STUDIES: CPT

## 2024-02-29 PROCEDURE — 99213 OFFICE O/P EST LOW 20 MIN: CPT

## 2024-02-29 PROCEDURE — G2211 COMPLEX E/M VISIT ADD ON: CPT

## 2024-02-29 PROCEDURE — 95886 MUSC TEST DONE W/N TEST COMP: CPT

## 2024-02-29 NOTE — HISTORY OF PRESENT ILLNESS
[FreeTextEntry1] : *** 08/29/2023  ***  Mr. CASTRO reports he is feeling entirely well, back to baseline. He received notification from [facility] that he may resume driving, and is very pleased about that.  He denies any interval events.  Since lowering levetiracetam at last visit, he reports no further side-effects.    *** 4/7/23 ****  No seizures, side effects since discharge spouse concurs. Seen by Dr. Davidson who requested a follow-up MRI, procedure performed this morning no report available at this time but to our eyes lesion appears stable. Mr. Castro nquired about driving restrictions, we conveyed the [facility] regulations restricting driving for 12 months.  HPI  77 year-old right handed man initially seen by Dr. Thakkar for stereotypical events characterized as appearing strange, transient amnesia that was occurring multiple times a day. Abundant typical events captured with left temporal evolution, clinically manifesting as profanities, and tachycardia terminated with  & Vimpat 200 mg BID. 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 Was seen by Dr. Helm, and later followed up by Dr. Davidson and case was discussed in [facility] conference. Overall, decision was made to observe the lesion and decide on biopsy given his age and comorbidities.

## 2024-02-29 NOTE — ASSESSMENT
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 Had abundant left temporal seizures during his emu stay, a multidisciplinary meeting was held and decision was made to monitor the lesion and biopsy as appropriate. Fortunately seizures are well controlled and remains seizure free since discharge. - continue levetiracetam 500 mg BID, had low efficacy on seizure control during his emu stay - however, since he has resumed driving, we elected to continue levetiracetam though it may not be imparting much benefit.  - Continue with Vimpat 200 mg BID - Driving restrictions - patient received notification from [employer] that he may resume driving.  - Continue to follow-up with Dr. Davidson and lesion surveillance (~Feb 2024) - repeat MRI + gado f/u study in Oct 2023 (last study Apr 2023)  I have spent 30 minutes or longer reviewing patient data or discussing with the patient the cause of seizures or seizure-like events and comorbid conditions, assessing the risk of recurrence, educating the patient or family to recognize seizures, discussing possible treatment options for seizures and comorbid conditions and documenting encounter and plan. More than 50% of time spent counseling and educating patient about epilepsy including safety issues, AED side effects and interactions, alcohol consumption, sleep deprivation, risks and driving privileges associated with the [facility]. Greater than 50% of the encounter time was spent on counseling and coordination of care for reviewing records in [employer], discussion with patient regarding plan.

## 2024-03-04 ENCOUNTER — NON-APPOINTMENT (OUTPATIENT)
Age: 79
End: 2024-03-04

## 2024-03-04 PROBLEM — R20.8 BURNING SENSATION OF FEET: Status: ACTIVE | Noted: 2024-01-08

## 2024-03-05 ENCOUNTER — APPOINTMENT (OUTPATIENT)
Dept: INFECTIOUS DISEASE | Facility: CLINIC | Age: 79
End: 2024-03-05
Payer: MEDICARE

## 2024-03-05 VITALS
DIASTOLIC BLOOD PRESSURE: 70 MMHG | SYSTOLIC BLOOD PRESSURE: 126 MMHG | HEART RATE: 66 BPM | BODY MASS INDEX: 25.23 KG/M2 | WEIGHT: 147 LBS | TEMPERATURE: 98 F | OXYGEN SATURATION: 95 %

## 2024-03-05 LAB
ALBUMIN SERPL ELPH-MCNC: 4.1 G/DL
ALP BLD-CCNC: 86 U/L
ALT SERPL-CCNC: 17 U/L
ANION GAP SERPL CALC-SCNC: 11 MMOL/L
AST SERPL-CCNC: 24 U/L
BILIRUB SERPL-MCNC: 0.4 MG/DL
BUN SERPL-MCNC: 15 MG/DL
CALCIUM SERPL-MCNC: 9.1 MG/DL
CHLORIDE SERPL-SCNC: 104 MMOL/L
CO2 SERPL-SCNC: 25 MMOL/L
CREAT SERPL-MCNC: 1.02 MG/DL
EGFR: 75 ML/MIN/1.73M2
GLUCOSE SERPL-MCNC: 89 MG/DL
POTASSIUM SERPL-SCNC: 4.2 MMOL/L
PROT SERPL-MCNC: 6.7 G/DL
SODIUM SERPL-SCNC: 141 MMOL/L

## 2024-03-05 PROCEDURE — 99213 OFFICE O/P EST LOW 20 MIN: CPT

## 2024-03-05 NOTE — ASSESSMENT
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 MRI not suggestive of OM. Outpatient derm culture was consistent with M marinum.  #Mycobacterium marinum infection - EKG was performed prior visit. QTc 414 ms. - CBC w/ diff and CMP today - Ophthalmology UTD.  Ishihara normal today. -  ethambutol and clarithromycin now on board more than 6 months. - Will need 1-2 months therapy after lesion is resolved. Elbow lesion resolved now.     Treat ~ 6 months thru 2/21/2024. STOP ABX today. - RTC 6-8 weeks - high dose flu vaccine at prior visit.

## 2024-03-05 NOTE — HISTORY OF PRESENT ILLNESS
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 Neuro workup generally unrevealing. ID was consulted during the admission for R elbow findings.  Per patient, he has a fish tank and a pond at home. 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 Stated he has had R elbow "skin infection" for several years. He was reportedly seen and treated by outpatient dermatologist (non-[facility] provider). Patient stated that the infection was worse at some point with drainage, s/p treatment of unknown medication for unknown duration. Patient thinks the elbow lesion is overall getting better. Denies elbow pain or difficulty bending the elbow. Inpatient US w/ 0.5 x 0.5 x 0.8 cm olecranon fossa fluid collection. MRI not suggestive of OM. ESR 71 and . Biopsy and AFB culture were performed by inpatient derm - no AFB positivity. Bx revealed epidermal hyperplasia, and dense diffuse granulomatous inflammation.  A fax was received from outpatient dermatologist on previous culture result. Collected 6/27/23. Prelim result Mycobacterium marinum. Susceptibility not available.  9/12/2023: Doing well. Tolerating meds. Has been to ophthal. Elbow a bit better, less red.  On meds about 1 month now. Since 8/21/2023.   10/23/2003: Doing well.  On meds 2.5 months now. Lesion decreasing at right elbow. Less red. More flat, not raised.  No change in vision.  12/4/2023: Doing well. No new complaints. Area at right elbow has continued to improve/shrink. Tolerating meds. Lost some wt.    1/26/2024: Doing well. No new complaints.  Tolerating meds.   Gained weight back.  Has 1 more month left of M. Marinum meds. Saw neuro for peripheral neuropathy burning on feet. W/up pending.  Now has no burning he says since he saw the doctor, ironically.   3/5/24: Doing well. No new lesion on right elbow Has herniated disc. Tolerating antibiotics.

## 2024-03-06 ENCOUNTER — RX RENEWAL (OUTPATIENT)
Age: 79
End: 2024-03-06

## 2024-03-06 RX ORDER — LACOSAMIDE 200 MG/1
200 TABLET ORAL
Qty: 60 | Refills: 5 | Status: ACTIVE | COMMUNITY
Start: 1900-01-01 | End: 1900-01-01

## 2024-03-07 LAB
BASOPHILS # BLD AUTO: 0.11 K/UL
BASOPHILS NFR BLD AUTO: 1.4 %
EOSINOPHIL # BLD AUTO: 1.05 K/UL
EOSINOPHIL NFR BLD AUTO: 13.5 %
HCT VFR BLD CALC: 43.4 %
HGB BLD-MCNC: 14.4 G/DL
IMM GRANULOCYTES NFR BLD AUTO: 0.4 %
LYMPHOCYTES # BLD AUTO: 0.67 K/UL
LYMPHOCYTES NFR BLD AUTO: 8.6 %
MAN DIFF?: NORMAL
MCHC RBC-ENTMCNC: 28.7 PG
MCHC RBC-ENTMCNC: 33.2 GM/DL
MCV RBC AUTO: 86.6 FL
MONOCYTES # BLD AUTO: 0.63 K/UL
MONOCYTES NFR BLD AUTO: 8.1 %
NEUTROPHILS # BLD AUTO: 5.3 K/UL
NEUTROPHILS NFR BLD AUTO: 68 %
PLATELET # BLD AUTO: 229 K/UL
RBC # BLD: 5.01 M/UL
RBC # FLD: 14.8 %
WBC # FLD AUTO: 7.79 K/UL

## 2024-03-08 ENCOUNTER — APPOINTMENT (OUTPATIENT)
Dept: OPHTHALMOLOGY | Facility: CLINIC | Age: 79
End: 2024-03-08
Payer: MEDICARE

## 2024-03-08 ENCOUNTER — NON-APPOINTMENT (OUTPATIENT)
Age: 79
End: 2024-03-08

## 2024-03-08 PROCEDURE — 92012 INTRM OPH EXAM EST PATIENT: CPT

## 2024-03-14 ENCOUNTER — APPOINTMENT (OUTPATIENT)
Dept: PAIN MANAGEMENT | Facility: CLINIC | Age: 79
End: 2024-03-14
Payer: MEDICARE

## 2024-03-14 VITALS
BODY MASS INDEX: 26.22 KG/M2 | SYSTOLIC BLOOD PRESSURE: 118 MMHG | DIASTOLIC BLOOD PRESSURE: 75 MMHG | HEART RATE: 66 BPM | HEIGHT: 63 IN | WEIGHT: 148 LBS

## 2024-03-14 PROCEDURE — 99205 OFFICE O/P NEW HI 60 MIN: CPT

## 2024-03-14 NOTE — ASSESSMENT
[FreeTextEntry1] : Chronic intermittent claudication lumbar spine Currently asymptomatic Non focal exam  Reviewed MRI lumbar spine with patient.  Discussed treatment options with patient including Lumbar STACIE, medications, continued PT etc. Since patient is asymptomatic, no treatment provided at this time.

## 2024-03-14 NOTE — HISTORY OF PRESENT ILLNESS
[FreeTextEntry1] : This is a case of a 78   -year-old right-handed male presents with a chief complaint of PAIN. In January 2024 wo incident began co non radicular pain soon extending to the right leg. More recently, noted needles and pins in the right leg. Patient noted total resolution of pain since this am. Patient went for MRI LS spine one month ago. Patient back to PT second round, to date, denying any relief. Patient tried Advil 3 bid for 2 weeks wo relief.   Patient also notes pain upon ambulation 1-2 blocks with total resolution upon sitting or bending forward..  Pain is relatively constant but worse with movement. No BB, or sensory level.  No prior back pain. Pain interfered with sleep with getting to sleep. However, he was able to sleep until waking up secondary to urinary complaints.   Triggers: None Comorbidities: None Imaging: L4-L5 severe right and moderate left neuroforaminal narrowing. Multilevel degenerative disc disease. The left L5 nerve root is displaced posteriorly of secondary to crowding of the thecal sac.  Medications: None Interventions: None CAM therapies: None SHX:  Retired : No ETOH, tobacco cannabis Family history: Noncontributory Allergies: NKA

## 2024-03-14 NOTE — PHYSICAL EXAM
[FreeTextEntry1] :         Constitutional: No signs of distress or signs of toxicity. Mental Status: Alert and well oriented. Speech fluent. No aphasia. Fund of knowledge intact. Psychiatric: Mood stable Cranial Nerve: PERRLA: No papilledema; No VFC; EOM full. no nystagmus. No Korina. V1-3 intact. No facial asymmetry, hearing grossly intact; palate elevates symmetrically, tongue midline Motor: No involuntary movements noted.  Adequate bulk, tone throughout, 5/5 strength of all muscle groups DTR: present and symmetrical; no clonus, plantars  downgoing Sensory: intact to primary and secondary modalities; neg Romberg Cerebellar: adequate finger to nose and heel to shin bilaterally. Gait: non antalgic or ataxic. Eyes: no redness or swelling HEENT: intact; no signs of trauma. Neck: No masses noted Pulmonary: no respiratory distress Vascular: no temperature, color change or sudomotor changes.; no edema Musculoskeletal: examination of the cervical spine reveals no midline tenderness, range of motion full upon flexion, extension and lateral rotation. Negative facet tenderness, Negative Spurlings bilaterally. examination of the lumbar spine reveals no midline or paraspinal tenderness; Range of motion full upon flexion,and lateral rotation; significant lumbar flexion impairment negative facet loading, No tenderness of sciatic notch, No tenderness of bilateral greater trochanters, Negative KIRBY, negative SLRT bilaterally, Skin: No rash.

## 2024-05-10 ENCOUNTER — APPOINTMENT (OUTPATIENT)
Dept: OPHTHALMOLOGY | Facility: CLINIC | Age: 79
End: 2024-05-10
Payer: MEDICARE

## 2024-05-10 ENCOUNTER — NON-APPOINTMENT (OUTPATIENT)
Age: 79
End: 2024-05-10

## 2024-05-10 PROCEDURE — 92014 COMPRE OPH EXAM EST PT 1/>: CPT

## 2024-05-28 ENCOUNTER — APPOINTMENT (OUTPATIENT)
Dept: INFECTIOUS DISEASE | Facility: CLINIC | Age: 79
End: 2024-05-28
Payer: MEDICARE

## 2024-05-28 VITALS
OXYGEN SATURATION: 95 % | DIASTOLIC BLOOD PRESSURE: 75 MMHG | SYSTOLIC BLOOD PRESSURE: 120 MMHG | HEIGHT: 63 IN | BODY MASS INDEX: 25.69 KG/M2 | WEIGHT: 145 LBS | TEMPERATURE: 97.3 F | HEART RATE: 70 BPM

## 2024-05-28 PROCEDURE — 99213 OFFICE O/P EST LOW 20 MIN: CPT

## 2024-05-28 NOTE — PHYSICAL EXAM
[General Appearance - Alert] : alert [General Appearance - In No Acute Distress] : in no acute distress [Sclera] : the sclera and conjunctiva were normal [Extraocular Movements] : extraocular movements were intact [Outer Ear] : the ears and nose were normal in appearance [Neck Appearance] : the appearance of the neck was normal [] : no respiratory distress [Musculoskeletal - Swelling] : no joint swelling [Motor Tone] : muscle strength and tone were normal [No Focal Deficits] : no focal deficits [Oriented To Time, Place, And Person] : oriented to person, place, and time [Affect] : the affect was normal [FreeTextEntry1] : R elbow with pinkish erythema, nontender, no nodule, no fluctuance,  Rash decreased in size and flat now

## 2024-05-28 NOTE — ASSESSMENT
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 MRI not suggestive of OM. Outpatient derm culture was consistent with M marinum.  #Mycobacterium marinum infection - resolved now and completed antibiotics 3/5/24. - EKG was performed prior visit. QTc 414 ms. - CBC w/ diff and CMP today. Follow high eosinophils, ?related to antibiotics.   - Ophthalmology UTD.  Ishihara always normal while on meds.   -  ethambutol and clarithromycin completed. -  Treated for 2 months therapy after lesion is resolved. Elbow lesion resolved now.     Treat ~ 6 months thru 2/21/2024.  - RTC as needed - high dose flu vaccine at prior visit. - will call pt with lab results. - if eosinophils still high may need further w/up.

## 2024-05-28 NOTE — HISTORY OF PRESENT ILLNESS
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 Neuro workup generally unrevealing. ID was consulted during the admission for R elbow findings.  Per patient, he has a fish tank and a pond at home. 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 Stated he has had R elbow "skin infection" for several years. He was reportedly seen and treated by outpatient dermatologist (non-[facility] provider). Patient stated that the infection was worse at some point with drainage, s/p treatment of unknown medication for unknown duration. Patient thinks the elbow lesion is overall getting better. Denies elbow pain or difficulty bending the elbow. Inpatient US w/ 0.5 x 0.5 x 0.8 cm olecranon fossa fluid collection. MRI not suggestive of OM. ESR 71 and . Biopsy and AFB culture were performed by inpatient derm - no AFB positivity. Bx revealed epidermal hyperplasia, and dense diffuse granulomatous inflammation.  A fax was received from outpatient dermatologist on previous culture result. Collected 6/27/23. Prelim result Mycobacterium marinum. Susceptibility not available.  9/12/2023: Doing well. Tolerating meds. Has been to ophthal. Elbow a bit better, less red.  On meds about 1 month now. Since 8/21/2023.   10/23/2003: Doing well.  On meds 2.5 months now. Lesion decreasing at right elbow. Less red. More flat, not raised.  No change in vision.  12/4/2023: Doing well. No new complaints. Area at right elbow has continued to improve/shrink. Tolerating meds. Lost some wt.    1/26/2024: Doing well. No new complaints.  Tolerating meds.   Gained weight back.  Has 1 more month left of M. Marinum meds. Saw neuro for peripheral neuropathy burning on feet. W/up pending.  Now has no burning he says since he saw the doctor, ironically.   3/5/24: Doing well. No new lesion on right elbow Has herniated disc. Tolerating antibiotics.  5/28/24: Doing well. No new lesions. Off meds for M. Marinum since 3/5/24. Did fall and break his right hip. S/p surgery about 2 months ago. In PT now.

## 2024-05-31 LAB
ALBUMIN SERPL ELPH-MCNC: 4.3 G/DL
ALP BLD-CCNC: 91 U/L
ALT SERPL-CCNC: 11 U/L
ANION GAP SERPL CALC-SCNC: 14 MMOL/L
AST SERPL-CCNC: 21 U/L
BILIRUB SERPL-MCNC: 0.4 MG/DL
BUN SERPL-MCNC: 19 MG/DL
CALCIUM SERPL-MCNC: 8.9 MG/DL
CHLORIDE SERPL-SCNC: 102 MMOL/L
CO2 SERPL-SCNC: 23 MMOL/L
CREAT SERPL-MCNC: 0.9 MG/DL
EGFR: 87 ML/MIN/1.73M2
GLUCOSE SERPL-MCNC: 88 MG/DL
POTASSIUM SERPL-SCNC: 4.3 MMOL/L
PROT SERPL-MCNC: 6.8 G/DL
SODIUM SERPL-SCNC: 139 MMOL/L

## 2024-06-03 LAB
BASOPHILS # BLD AUTO: 0.08 K/UL
BASOPHILS NFR BLD AUTO: 1.2 %
EOSINOPHIL # BLD AUTO: 0.56 K/UL
EOSINOPHIL NFR BLD AUTO: 8.3 %
HCT VFR BLD CALC: 42.7 %
HGB BLD-MCNC: 14 G/DL
IMM GRANULOCYTES NFR BLD AUTO: 0.4 %
LYMPHOCYTES # BLD AUTO: 0.82 K/UL
LYMPHOCYTES NFR BLD AUTO: 12.2 %
MAN DIFF?: NORMAL
MCHC RBC-ENTMCNC: 28.3 PG
MCHC RBC-ENTMCNC: 32.8 GM/DL
MCV RBC AUTO: 86.4 FL
MONOCYTES # BLD AUTO: 0.52 K/UL
MONOCYTES NFR BLD AUTO: 7.7 %
NEUTROPHILS # BLD AUTO: 4.73 K/UL
NEUTROPHILS NFR BLD AUTO: 70.2 %
PLATELET # BLD AUTO: 236 K/UL
RBC # BLD: 4.94 M/UL
RBC # FLD: 14.4 %
WBC # FLD AUTO: 6.74 K/UL

## 2024-07-10 ENCOUNTER — APPOINTMENT (OUTPATIENT)
Dept: NEUROLOGY | Facility: CLINIC | Age: 79
End: 2024-07-10
Payer: MEDICARE

## 2024-07-10 VITALS
WEIGHT: 147 LBS | BODY MASS INDEX: 26.05 KG/M2 | HEART RATE: 64 BPM | DIASTOLIC BLOOD PRESSURE: 74 MMHG | SYSTOLIC BLOOD PRESSURE: 126 MMHG | HEIGHT: 63 IN

## 2024-07-10 DIAGNOSIS — G40.109 LOCALIZATION-RELATED (FOCAL) (PARTIAL) SYMPTOMATIC EPILEPSY AND EPILEPTIC SYNDROMES WITH SIMPLE PARTIAL SEIZURES, NOT INTRACTABLE, W/OUT STATUS EPILEPTICUS: ICD-10-CM

## 2024-07-10 DIAGNOSIS — C71.9 MALIGNANT NEOPLASM OF BRAIN, UNSPECIFIED: ICD-10-CM

## 2024-07-10 DIAGNOSIS — C80.1 OTHER DISORDERS OF NERVOUS SYSTEM: ICD-10-CM

## 2024-07-10 DIAGNOSIS — G98.8 OTHER DISORDERS OF NERVOUS SYSTEM: ICD-10-CM

## 2024-07-10 DIAGNOSIS — G93.9 DISORDER OF BRAIN, UNSPECIFIED: ICD-10-CM

## 2024-07-10 PROCEDURE — 99214 OFFICE O/P EST MOD 30 MIN: CPT

## 2024-08-27 ENCOUNTER — RX RENEWAL (OUTPATIENT)
Age: 79
End: 2024-08-27

## 2024-09-16 ENCOUNTER — RX RENEWAL (OUTPATIENT)
Age: 79
End: 2024-09-16

## 2024-09-21 ENCOUNTER — APPOINTMENT (OUTPATIENT)
Dept: MRI IMAGING | Facility: CLINIC | Age: 79
End: 2024-09-21

## 2024-09-21 ENCOUNTER — OUTPATIENT (OUTPATIENT)
Dept: OUTPATIENT SERVICES | Facility: HOSPITAL | Age: 79
LOS: 1 days | End: 2024-09-21
Payer: MEDICARE

## 2024-09-21 DIAGNOSIS — G40.109 LOCALIZATION-RELATED (FOCAL) (PARTIAL) SYMPTOMATIC EPILEPSY AND EPILEPTIC SYNDROMES WITH SIMPLE PARTIAL SEIZURES, NOT INTRACTABLE, WITHOUT STATUS EPILEPTICUS: ICD-10-CM

## 2024-09-21 DIAGNOSIS — Z98.890 OTHER SPECIFIED POSTPROCEDURAL STATES: Chronic | ICD-10-CM

## 2024-09-21 PROCEDURE — 70553 MRI BRAIN STEM W/O & W/DYE: CPT | Mod: 26,MH

## 2024-09-21 PROCEDURE — A9585: CPT

## 2024-09-21 PROCEDURE — 70553 MRI BRAIN STEM W/O & W/DYE: CPT

## 2024-10-23 ENCOUNTER — APPOINTMENT (OUTPATIENT)
Dept: NEUROLOGY | Facility: CLINIC | Age: 79
End: 2024-10-23
Payer: MEDICARE

## 2024-10-23 VITALS
DIASTOLIC BLOOD PRESSURE: 82 MMHG | BODY MASS INDEX: 25.52 KG/M2 | HEIGHT: 63 IN | SYSTOLIC BLOOD PRESSURE: 158 MMHG | WEIGHT: 144 LBS | HEART RATE: 62 BPM

## 2024-10-23 DIAGNOSIS — G40.109 LOCALIZATION-RELATED (FOCAL) (PARTIAL) SYMPTOMATIC EPILEPSY AND EPILEPTIC SYNDROMES WITH SIMPLE PARTIAL SEIZURES, NOT INTRACTABLE, W/OUT STATUS EPILEPTICUS: ICD-10-CM

## 2024-10-23 PROCEDURE — 99213 OFFICE O/P EST LOW 20 MIN: CPT

## 2024-10-23 PROCEDURE — G2211 COMPLEX E/M VISIT ADD ON: CPT

## 2024-11-08 ENCOUNTER — APPOINTMENT (OUTPATIENT)
Dept: OPHTHALMOLOGY | Facility: CLINIC | Age: 79
End: 2024-11-08

## 2025-01-22 ENCOUNTER — APPOINTMENT (OUTPATIENT)
Dept: PULMONOLOGY | Facility: CLINIC | Age: 80
End: 2025-01-22
Payer: MEDICARE

## 2025-01-22 VITALS
TEMPERATURE: 98 F | HEART RATE: 80 BPM | SYSTOLIC BLOOD PRESSURE: 120 MMHG | OXYGEN SATURATION: 96 % | RESPIRATION RATE: 16 BRPM | BODY MASS INDEX: 27.46 KG/M2 | WEIGHT: 155 LBS | HEIGHT: 63 IN | DIASTOLIC BLOOD PRESSURE: 60 MMHG

## 2025-01-22 PROCEDURE — 99214 OFFICE O/P EST MOD 30 MIN: CPT

## 2025-06-02 ENCOUNTER — NON-APPOINTMENT (OUTPATIENT)
Age: 80
End: 2025-06-02

## 2025-06-03 ENCOUNTER — APPOINTMENT (OUTPATIENT)
Dept: NEUROLOGY | Facility: CLINIC | Age: 80
End: 2025-06-03
Payer: MEDICARE

## 2025-06-03 ENCOUNTER — LABORATORY RESULT (OUTPATIENT)
Age: 80
End: 2025-06-03

## 2025-06-03 ENCOUNTER — NON-APPOINTMENT (OUTPATIENT)
Age: 80
End: 2025-06-03

## 2025-06-03 VITALS
BODY MASS INDEX: 27.46 KG/M2 | SYSTOLIC BLOOD PRESSURE: 145 MMHG | HEIGHT: 63 IN | HEART RATE: 76 BPM | DIASTOLIC BLOOD PRESSURE: 70 MMHG | WEIGHT: 155 LBS

## 2025-06-03 DIAGNOSIS — G40.109 LOCALIZATION-RELATED (FOCAL) (PARTIAL) SYMPTOMATIC EPILEPSY AND EPILEPTIC SYNDROMES WITH SIMPLE PARTIAL SEIZURES, NOT INTRACTABLE, W/OUT STATUS EPILEPTICUS: ICD-10-CM

## 2025-06-03 DIAGNOSIS — R20.8 OTHER DISTURBANCES OF SKIN SENSATION: ICD-10-CM

## 2025-06-03 LAB
ALBUMIN SERPL ELPH-MCNC: 3.8 G/DL
ALP BLD-CCNC: 73 U/L
ALT SERPL-CCNC: 18 U/L
ANION GAP SERPL CALC-SCNC: 12 MMOL/L
AST SERPL-CCNC: 20 U/L
BASOPHILS # BLD AUTO: 0.07 K/UL
BASOPHILS NFR BLD AUTO: 0.7 %
BILIRUB SERPL-MCNC: 0.3 MG/DL
BUN SERPL-MCNC: 17 MG/DL
CALCIUM SERPL-MCNC: 9.5 MG/DL
CHLORIDE SERPL-SCNC: 103 MMOL/L
CO2 SERPL-SCNC: 25 MMOL/L
CREAT SERPL-MCNC: 0.9 MG/DL
EGFRCR SERPLBLD CKD-EPI 2021: 86 ML/MIN/1.73M2
EOSINOPHIL # BLD AUTO: 0.52 K/UL
EOSINOPHIL NFR BLD AUTO: 5.6 %
GLUCOSE SERPL-MCNC: 84 MG/DL
HCT VFR BLD CALC: 42.8 %
HGB BLD-MCNC: 13.8 G/DL
IMM GRANULOCYTES NFR BLD AUTO: 0.5 %
LYMPHOCYTES # BLD AUTO: 0.87 K/UL
LYMPHOCYTES NFR BLD AUTO: 9.3 %
MAN DIFF?: NORMAL
MCHC RBC-ENTMCNC: 27.3 PG
MCHC RBC-ENTMCNC: 32.2 G/DL
MCV RBC AUTO: 84.8 FL
MONOCYTES # BLD AUTO: 0.61 K/UL
MONOCYTES NFR BLD AUTO: 6.5 %
NEUTROPHILS # BLD AUTO: 7.24 K/UL
NEUTROPHILS NFR BLD AUTO: 77.4 %
PLATELET # BLD AUTO: 304 K/UL
POTASSIUM SERPL-SCNC: 4.3 MMOL/L
PROT SERPL-MCNC: 6.8 G/DL
RBC # BLD: 5.05 M/UL
RBC # FLD: 14.2 %
SODIUM SERPL-SCNC: 140 MMOL/L
WBC # FLD AUTO: 9.36 K/UL

## 2025-06-03 PROCEDURE — 99213 OFFICE O/P EST LOW 20 MIN: CPT

## 2025-06-09 LAB
ALBUMIN MFR SERPL ELPH: 50.1 %
ALBUMIN SERPL-MCNC: 3.4 G/DL
ALBUMIN/GLOB SERPL: 1 RATIO
ALPHA1 GLOB MFR SERPL ELPH: 5.5 %
ALPHA1 GLOB SERPL ELPH-MCNC: 0.4 G/DL
ALPHA2 GLOB MFR SERPL ELPH: 11.8 %
ALPHA2 GLOB SERPL ELPH-MCNC: 0.8 G/DL
B-GLOBULIN MFR SERPL ELPH: 14.6 %
B-GLOBULIN SERPL ELPH-MCNC: 1 G/DL
DEPRECATED KAPPA LC FREE/LAMBDA SER: 1.4 RATIO
GAMMA GLOB FLD ELPH-MCNC: 1.2 G/DL
GAMMA GLOB MFR SERPL ELPH: 18 %
IGA SERPL-MCNC: 265 MG/DL
IGG SERPL-MCNC: 1275 MG/DL
IGM SERPL-MCNC: 190 MG/DL
INTERPRETATION SERPL IEP-IMP: NORMAL
KAPPA LC CSF-MCNC: 2.44 MG/DL
KAPPA LC SERPL-MCNC: 3.41 MG/DL
M PROTEIN SPEC IFE-MCNC: NORMAL
PROT SERPL-MCNC: 6.8 G/DL
PROT SERPL-MCNC: 6.8 G/DL

## 2025-06-12 LAB
CASPR2 IGG SERPL QL IF: DETECTED
Lab: ABNORMAL

## 2025-06-16 ENCOUNTER — APPOINTMENT (OUTPATIENT)
Dept: MRI IMAGING | Facility: IMAGING CENTER | Age: 80
End: 2025-06-16

## 2025-07-07 ENCOUNTER — NON-APPOINTMENT (OUTPATIENT)
Age: 80
End: 2025-07-07

## 2025-07-10 ENCOUNTER — APPOINTMENT (OUTPATIENT)
Dept: NEUROLOGY | Facility: CLINIC | Age: 80
End: 2025-07-10
Payer: MEDICARE

## 2025-07-10 VITALS
BODY MASS INDEX: 27.46 KG/M2 | DIASTOLIC BLOOD PRESSURE: 78 MMHG | SYSTOLIC BLOOD PRESSURE: 130 MMHG | HEART RATE: 76 BPM | HEIGHT: 63 IN | WEIGHT: 155 LBS

## 2025-07-10 PROCEDURE — G2211 COMPLEX E/M VISIT ADD ON: CPT

## 2025-07-10 PROCEDURE — 99212 OFFICE O/P EST SF 10 MIN: CPT
